# Patient Record
Sex: MALE | Race: WHITE | NOT HISPANIC OR LATINO | Employment: OTHER | RURAL
[De-identification: names, ages, dates, MRNs, and addresses within clinical notes are randomized per-mention and may not be internally consistent; named-entity substitution may affect disease eponyms.]

---

## 2017-01-10 ENCOUNTER — OFFICE VISIT (OUTPATIENT)
Dept: CARDIOLOGY | Facility: CLINIC | Age: 67
End: 2017-01-10

## 2017-01-10 VITALS
SYSTOLIC BLOOD PRESSURE: 137 MMHG | DIASTOLIC BLOOD PRESSURE: 69 MMHG | BODY MASS INDEX: 34.3 KG/M2 | WEIGHT: 239.6 LBS | HEIGHT: 70 IN | HEART RATE: 73 BPM

## 2017-01-10 DIAGNOSIS — I25.10 CORONARY ARTERY DISEASE INVOLVING NATIVE CORONARY ARTERY OF NATIVE HEART WITHOUT ANGINA PECTORIS: ICD-10-CM

## 2017-01-10 DIAGNOSIS — I34.0 MITRAL VALVE INSUFFICIENCY, UNSPECIFIED ETIOLOGY: Primary | ICD-10-CM

## 2017-01-10 DIAGNOSIS — I48.0 PAROXYSMAL ATRIAL FIBRILLATION (HCC): ICD-10-CM

## 2017-01-10 DIAGNOSIS — I87.2 PERIPHERAL VENOUS INSUFFICIENCY: ICD-10-CM

## 2017-01-10 PROCEDURE — 99212 OFFICE O/P EST SF 10 MIN: CPT | Performed by: INTERNAL MEDICINE

## 2017-01-10 RX ORDER — GUAIFENESIN 600 MG/1
1200 TABLET, EXTENDED RELEASE ORAL 2 TIMES DAILY
COMMUNITY
End: 2017-07-18

## 2017-01-10 NOTE — PROGRESS NOTES
"  OFFICE FOLLOW UP- [facility], [place]     Date of Encounter:01/10/2017     Name: Smith Craven  : 1950  Address: [address] [facility] [address]  Phone: [phone]    PCP: Jeane Baird MD  [address] [place] [id number]    Smith Craven is a 66 y.o. male.      Chief Complaint: Follow up for CAD     PROBLEM LIST:  1. Cardiac disease, multifactorial:   A.  Non-obstructive CAD by cath, 2008.   B.  Atrial fibrillation. Chads2-vasc = 0.   C.  Normal LV function.   D.  \"moderate to severe\" mitral regurgitation by transesophageal echocardiogram.  2.  Obesity.  3.  Neurocardiogenic syncope.  4.  Cholelithiasis status post cholecystectomy, summer 2016:   A. Procedure complicated by pneumonitis and need for blood transfusions.   5.  COPD and ANN MARIE, CPAP compliant:   A. Followed by Yossi Blake MD.  6.  Chronic lower extremity venous insufficiency.      No Known Allergies      Current Medications:   •  apixaban 5 mg po twice daily.  •  cholecalciferol (VITAMIN D3) 5,000 Units po daily.  •  Fluticasone Furoate-Vilanterol 200-25 MCG/INH aerosol powder , Inhale 1 puff Daily.  •  folic acid 1 MG po daily.   •  furosemide 40 MG po daily.    •  metoprolol tartrate 12.5 mg po twice daily.   •  potassium chloride 10 MEQ CR tablet po daily.       Subjective: When I last saw this patient in these offices last July I felt that his symptoms of shortness of air were likely pulmonary.  He was referred to Yossi Blake MD, for an evaluation and Dr. Blake basically concurs with this.  He has had no real change over the last several months except for a \"brief interruption\" in activities that was necessitated by a complicated cholecystectomy in the late summer of last year.  He has no angina and has stable exertional dyspnea that is reported to be \"a lot better\" after the initiation of a Breo inhaler.               The following portions of the patient's history were reviewed and updated as " "appropriate: allergies, current medications and problem list.    HPI: Pertinent positives as listed in the HPI.  All other systems reviewed and negative.      Objective:    Vitals:    01/10/17 1157 01/10/17 1158   BP: 132/78 137/69   BP Location: Left arm Left arm   Patient Position: Sitting Standing   Pulse: 73 73   Weight: 239 lb 9.6 oz (109 kg)    Height: 70\" (177.8 cm)        Physical Exam   Cardiovascular: Intact distal pulses.    Examination of the heart and lungs today reveals no audible murmur. I do not hear a gallop, rub, click or other abnormal sound.   Pulmonary/Chest:   The lung fields are distant. I do not hear crackles.    Musculoskeletal:   Extremities: Unremarkable.   Vitals reviewed.      Diagnostic Data:  N/A       Procedures N/a      Assessment:  The patient is overall doing well at the current time.     I do think we need to \"relook\" his mitral valve and we will do this with a transthoracic echo next summer.  Otherwise we will continue current medications and recommendations and have him follow up with Dr. Blake for the treatment of his pulmonary issues.       Scribed for Brant Napier MD by Lolita Willis. 1/10/2017  12:11 PM    I, Brant Napier MD, [facility], [facility], personally performed the services described in this documentation as scribed by the above named individual in my presence, and it is both accurate and complete. At 2:47 PM on 01/10/2017              "

## 2017-02-23 ENCOUNTER — OFFICE VISIT (OUTPATIENT)
Dept: PULMONOLOGY | Facility: CLINIC | Age: 67
End: 2017-02-23

## 2017-02-23 VITALS
HEART RATE: 61 BPM | BODY MASS INDEX: 34.36 KG/M2 | RESPIRATION RATE: 16 BRPM | HEIGHT: 70 IN | DIASTOLIC BLOOD PRESSURE: 78 MMHG | SYSTOLIC BLOOD PRESSURE: 122 MMHG | TEMPERATURE: 97.8 F | WEIGHT: 240 LBS | OXYGEN SATURATION: 92 %

## 2017-02-23 DIAGNOSIS — G47.33 OSA ON CPAP: ICD-10-CM

## 2017-02-23 DIAGNOSIS — R05.9 COUGH: ICD-10-CM

## 2017-02-23 DIAGNOSIS — Z99.89 OSA ON CPAP: ICD-10-CM

## 2017-02-23 DIAGNOSIS — J43.9 PULMONARY EMPHYSEMA, UNSPECIFIED EMPHYSEMA TYPE (HCC): Primary | ICD-10-CM

## 2017-02-23 DIAGNOSIS — E66.9 OBESITY (BMI 30-39.9): ICD-10-CM

## 2017-02-23 DIAGNOSIS — R06.02 SOB (SHORTNESS OF BREATH): ICD-10-CM

## 2017-02-23 PROCEDURE — 99214 OFFICE O/P EST MOD 30 MIN: CPT | Performed by: INTERNAL MEDICINE

## 2017-02-23 RX ORDER — DOXYCYCLINE HYCLATE 100 MG/1
100 CAPSULE ORAL 2 TIMES DAILY
COMMUNITY
End: 2017-07-18

## 2017-02-23 RX ORDER — ALBUTEROL SULFATE 90 UG/1
2 AEROSOL, METERED RESPIRATORY (INHALATION) EVERY 4 HOURS PRN
Status: DISCONTINUED | OUTPATIENT
Start: 2017-02-23 | End: 2021-05-27

## 2017-02-23 NOTE — PROGRESS NOTES
Subjective:     Chief Complaint:   Chief Complaint   Patient presents with   • Cough       HPI:    Smith Craven is a 66 y.o. male here for follow-up of his COPD and obstructive sleep apnea.  He also has a history of moderate to severe mitral regurgitation.  However, his dyspnea improved significantly on inhaled therapy so it has been felt that a significant portion of his dyspnea is pulmonary related as opposed to valvular related.    He continues on breo ellipta and as needed albuterol.  His level of dyspnea exertion is roughly the same.  It is overall improved compared to prior to initiation of inhaled therapy.  He notes no purulent sputum production.  He denies any hemoptysis.  He denies any chest pain.    He continues on CPAP therapy.  He notes no significant daytime somnolence.        Current medications are:   Current Outpatient Prescriptions:   •  apixaban (ELIQUIS) 5 MG tablet tablet, Take 5 mg by mouth 2 (two) times a day., Disp: , Rfl:   •  cholecalciferol (VITAMIN D3) 1000 UNITS tablet, Take 5,000 Units by mouth every morning., Disp: , Rfl:   •  doxycycline (VIBRAMYCIN) 100 MG capsule, Take 100 mg by mouth 2 (Two) Times a Day., Disp: , Rfl:   •  Fluticasone Furoate-Vilanterol 200-25 MCG/INH aerosol powder , Inhale 1 puff Daily., Disp: 1 each, Rfl: 11  •  folic acid (FOLVITE) 1 MG tablet, Take 1 mg by mouth every morning., Disp: , Rfl:   •  furosemide (LASIX) 40 MG tablet, Take 40 mg by mouth every morning., Disp: , Rfl:   •  guaiFENesin (MUCINEX) 600 MG 12 hr tablet, Take 1,200 mg by mouth 2 (Two) Times a Day., Disp: , Rfl:   •  metoprolol tartrate (LOPRESSOR) 25 MG tablet, Take 12.5 mg by mouth 2 (two) times a day., Disp: , Rfl:   •  polyethylene glycol (MIRALAX) powder, , Disp: , Rfl:   •  potassium chloride (K-DUR,KLOR-CON) 10 MEQ CR tablet, , Disp: , Rfl:   •  vitamin B-12 (CYANOCOBALAMIN) 1000 MCG tablet, Take 2,500 mcg by mouth daily., Disp: , Rfl:     Current Facility-Administered Medications:    •  albuterol (PROVENTIL HFA;VENTOLIN HFA) inhaler 2 puff, 2 puff, Inhalation, Q4H PRN, Yossi Blake MD.      The patient's relevant past medical, surgical, family and social history were reviewed and updated in Epic as appropriate.     ROS:    Review of Systems   HENT: Positive for congestion.    Respiratory: Positive for shortness of breath.    Psychiatric/Behavioral: Positive for sleep disturbance.         Objective:    Physical Exam   Constitutional: He is oriented to person, place, and time. He appears well-developed and well-nourished.   HENT:   Head: Normocephalic and atraumatic.   Nose: Nose normal.   Mouth/Throat: Oropharynx is clear and moist. No oropharyngeal exudate.   Class IV airway   Eyes: Conjunctivae are normal. Pupils are equal, round, and reactive to light. No scleral icterus.   Neck: Neck supple. No tracheal deviation present. No thyromegaly present.   Cardiovascular: Normal rate and regular rhythm.  Exam reveals no gallop and no friction rub.    No murmur heard.  Pulmonary/Chest: Effort normal. No respiratory distress. He has no wheezes. He has no rales. He exhibits no tenderness.   Few basilar crackles   Abdominal: Soft. Bowel sounds are normal. He exhibits no distension. There is no tenderness.   Musculoskeletal: He exhibits no edema or deformity.   Neurological: He is alert and oriented to person, place, and time.   Skin: Skin is warm and dry. No rash noted.   Psychiatric: He has a normal mood and affect. His behavior is normal.   Nursing note and vitals reviewed.      Diagnostics:     No additional    Assessment:    Problem List Items Addressed This Visit        Pulmonary Problems    COPD (chronic obstructive pulmonary disease) - Primary    Overview     Mild to moderate obstruction on Oct 2016 PFTs         Relevant Medications    Fluticasone Furoate-Vilanterol 200-25 MCG/INH aerosol powder     albuterol (PROVENTIL HFA;VENTOLIN HFA) inhaler 2 puff    SOB (shortness of breath)     Relevant Medications    albuterol (PROVENTIL HFA;VENTOLIN HFA) inhaler 2 puff    Cough    ANN MARIE on CPAP    Overview     CPAP compliant            Other    Obesity (BMI 30-39.9)    Overview     BMI 35.5               His obstructive airways disease as well as obstructive sleep apnea seem be well controlled on his current medication regimen and CPAP regimen.  He does have some residual shortness of breath.    Plan:     Continue breo ellipta  Continue as needed albuterol  Continue auto CPAP therapy  Long-term weight loss  He has follow-up with Dr. Napier who is monitoring his valvular disease via echocardiography      Signed by  Yossi Blake MD

## 2017-05-12 ENCOUNTER — HOSPITAL ENCOUNTER (OUTPATIENT)
Dept: HOSPITAL 79 - KOH-I | Age: 67
End: 2017-05-12
Attending: INTERNAL MEDICINE
Payer: COMMERCIAL

## 2017-05-12 DIAGNOSIS — K43.9: ICD-10-CM

## 2017-05-12 DIAGNOSIS — R93.3: ICD-10-CM

## 2017-05-12 DIAGNOSIS — L02.211: ICD-10-CM

## 2017-05-12 DIAGNOSIS — K42.9: Primary | ICD-10-CM

## 2017-07-18 ENCOUNTER — OFFICE VISIT (OUTPATIENT)
Dept: CARDIOLOGY | Facility: CLINIC | Age: 67
End: 2017-07-18

## 2017-07-18 ENCOUNTER — HOSPITAL ENCOUNTER (OUTPATIENT)
Dept: CARDIOLOGY | Facility: HOSPITAL | Age: 67
Discharge: HOME | End: 2017-07-18
Attending: INTERNAL MEDICINE | Admitting: INTERNAL MEDICINE

## 2017-07-18 VITALS
WEIGHT: 246.2 LBS | BODY MASS INDEX: 35.24 KG/M2 | HEART RATE: 56 BPM | HEIGHT: 70 IN | DIASTOLIC BLOOD PRESSURE: 74 MMHG | SYSTOLIC BLOOD PRESSURE: 128 MMHG

## 2017-07-18 VITALS — HEIGHT: 70 IN | WEIGHT: 240 LBS | BODY MASS INDEX: 34.36 KG/M2

## 2017-07-18 DIAGNOSIS — I34.0 MITRAL VALVE INSUFFICIENCY, UNSPECIFIED ETIOLOGY: ICD-10-CM

## 2017-07-18 DIAGNOSIS — I34.0 MITRAL VALVE INSUFFICIENCY, UNSPECIFIED ETIOLOGY: Primary | ICD-10-CM

## 2017-07-18 DIAGNOSIS — I48.0 PAROXYSMAL ATRIAL FIBRILLATION (HCC): ICD-10-CM

## 2017-07-18 LAB
BH CV ECHO MEAS - AO ROOT AREA (BSA CORRECTED): 1.6
BH CV ECHO MEAS - AO ROOT AREA: 10.2 CM^2
BH CV ECHO MEAS - AO ROOT DIAM: 3.6 CM
BH CV ECHO MEAS - BSA(HAYCOCK): 2.4 M^2
BH CV ECHO MEAS - BSA: 2.3 M^2
BH CV ECHO MEAS - BZI_BMI: 34.4 KILOGRAMS/M^2
BH CV ECHO MEAS - BZI_METRIC_HEIGHT: 177.8 CM
BH CV ECHO MEAS - BZI_METRIC_WEIGHT: 108.9 KG
BH CV ECHO MEAS - CONTRAST EF (2CH): 50.3 ML/M^2
BH CV ECHO MEAS - CONTRAST EF 4CH: 67.9 ML/M^2
BH CV ECHO MEAS - EDV(CUBED): 175.6 ML
BH CV ECHO MEAS - EDV(MOD-SP2): 143 ML
BH CV ECHO MEAS - EDV(MOD-SP4): 196 ML
BH CV ECHO MEAS - EDV(TEICH): 153.7 ML
BH CV ECHO MEAS - EF(CUBED): 69.7 %
BH CV ECHO MEAS - EF(MOD-SP2): 50.3 %
BH CV ECHO MEAS - EF(MOD-SP4): 67.9 %
BH CV ECHO MEAS - EF(TEICH): 60.7 %
BH CV ECHO MEAS - ESV(CUBED): 53.2 ML
BH CV ECHO MEAS - ESV(MOD-SP2): 71 ML
BH CV ECHO MEAS - ESV(MOD-SP4): 63 ML
BH CV ECHO MEAS - ESV(TEICH): 60.4 ML
BH CV ECHO MEAS - FS: 32.9 %
BH CV ECHO MEAS - IVS/LVPW: 0.98
BH CV ECHO MEAS - IVSD: 1.2 CM
BH CV ECHO MEAS - LA DIMENSION: 5.1 CM
BH CV ECHO MEAS - LA/AO: 1.4
BH CV ECHO MEAS - LAT PEAK E' VEL: 8.5 CM/SEC
BH CV ECHO MEAS - LV DIASTOLIC VOL/BSA (35-75): 86.9 ML/M^2
BH CV ECHO MEAS - LV MASS(C)D: 288.5 GRAMS
BH CV ECHO MEAS - LV MASS(C)DI: 127.9 GRAMS/M^2
BH CV ECHO MEAS - LV MAX PG: 5.8 MMHG
BH CV ECHO MEAS - LV MEAN PG: 2 MMHG
BH CV ECHO MEAS - LV SYSTOLIC VOL/BSA (12-30): 27.9 ML/M^2
BH CV ECHO MEAS - LV V1 MAX: 120 CM/SEC
BH CV ECHO MEAS - LV V1 MEAN: 65.1 CM/SEC
BH CV ECHO MEAS - LV V1 VTI: 25.7 CM
BH CV ECHO MEAS - LVIDD: 5.6 CM
BH CV ECHO MEAS - LVIDS: 3.8 CM
BH CV ECHO MEAS - LVLD AP2: 8.9 CM
BH CV ECHO MEAS - LVLD AP4: 9.2 CM
BH CV ECHO MEAS - LVLS AP2: 7.8 CM
BH CV ECHO MEAS - LVLS AP4: 7.9 CM
BH CV ECHO MEAS - LVOT AREA (M): 3.5 CM^2
BH CV ECHO MEAS - LVOT AREA: 3.5 CM^2
BH CV ECHO MEAS - LVOT DIAM: 2.1 CM
BH CV ECHO MEAS - LVPWD: 1.2 CM
BH CV ECHO MEAS - MED PEAK E' VEL: 10.3 CM/SEC
BH CV ECHO MEAS - MV A MAX VEL: 49.4 CM/SEC
BH CV ECHO MEAS - MV E MAX VEL: 128 CM/SEC
BH CV ECHO MEAS - MV E/A: 2.6
BH CV ECHO MEAS - RAP SYSTOLE: 8 MMHG
BH CV ECHO MEAS - RVDD: 3.5 CM
BH CV ECHO MEAS - RVSP: 41.4 MMHG
BH CV ECHO MEAS - SI(CUBED): 54.3 ML/M^2
BH CV ECHO MEAS - SI(LVOT): 39.5 ML/M^2
BH CV ECHO MEAS - SI(MOD-SP2): 31.9 ML/M^2
BH CV ECHO MEAS - SI(MOD-SP4): 59 ML/M^2
BH CV ECHO MEAS - SI(TEICH): 41.3 ML/M^2
BH CV ECHO MEAS - SV(CUBED): 122.5 ML
BH CV ECHO MEAS - SV(LVOT): 89 ML
BH CV ECHO MEAS - SV(MOD-SP2): 72 ML
BH CV ECHO MEAS - SV(MOD-SP4): 133 ML
BH CV ECHO MEAS - SV(TEICH): 93.3 ML
BH CV ECHO MEAS - TAPSE (>1.6): 1.9 CM2
BH CV ECHO MEAS - TR MAX VEL: 289 CM/SEC
BH CV VAS BP RIGHT ARM: NORMAL MMHG
BH CV XLRA - RV BASE: 4.9 CM
BH CV XLRA - RV LENGTH: 8.4 CM
BH CV XLRA - RV MID: 3.4 CM
BH CV XLRA - TDI S': 15.5 CM/SEC
E/E' RATIO: 14
LEFT ATRIUM VOLUME INDEX: 44.2 ML/M2

## 2017-07-18 PROCEDURE — 93306 TTE W/DOPPLER COMPLETE: CPT | Performed by: INTERNAL MEDICINE

## 2017-07-18 PROCEDURE — 93306 TTE W/DOPPLER COMPLETE: CPT

## 2017-07-18 PROCEDURE — 99214 OFFICE O/P EST MOD 30 MIN: CPT | Performed by: INTERNAL MEDICINE

## 2017-07-18 NOTE — PROGRESS NOTES
"  OFFICE FOLLOW UP     Date of Encounter:2017     Name: Smith Craven  : 1950  Address: [address] [id number]  Phone: [phone]    PCP: Jeane Baird MD  [address] [place] [address]   [facility] [address]    Smith Craven is a 66 y.o.  white male, resident of [facility] .    Chief Complaint:  Follow up of CAD, afib    Problem List:   1. Cardiac disease, multifactorial:    A. Non-obstructive CAD by cath, 2008.    B. Atrial fibrillation. Chads2-vasc = 0.    C. Normal LV function.    D. \"moderate to severe\" mitral regurgitation by transesophageal echocardiogram 2017  E. \"moderate to severe\" mitral regurgitation by TTE 2017, unchanged from previous study    2. Obesity.   3. Neurocardiogenic syncope.   4. Cholelithiasis status post cholecystectomy, summer 2016:    A. Procedure complicated by pneumonitis and need for blood transfusions.    5. COPD and ANN MARIE, CPAP compliant:    A. Followed by Yossi Blake MD.   6. Chronic lower extremity venous insufficiency.    Allergies:  No Known Allergies    Current Medications:  •  apixaban 5 MG by mouth twice daily.   •  cholecalciferol 1000 UNITS 5,000 Units by mouth every morning.  •  folic acid 1 MG by mouth every morning.  •  Furosemide 40 MG by mouth every morning.   •  metoprolol tartrate 25 MG - 12.5 mg by mouth twice daily    •  potassium chloride 10 MEQ CR by mouth Daily.   •  vitamin B-12 1000 MCG - 2,500 mcg by mouth daily.    History of Present Illness:           Mr. Craven presents today for 6 month follow-up. Since his last visit he has remained stable and asymptomatic from cardiac standpoint. He denies any chest pain, palpitations, shortness of breath at rest or CHF symptoms. He had a LAUREN in January that was significant for severe MR. He returns today for 6 month follow up with an echocardiogram to \"relook\" his MV. He does have some exertional dyspnea, however he states this is much improved. He continues to follow up with Dr." "Hayden.     The following portions of the patient's history were reviewed and updated as appropriate: allergies, current medications and problem list.    HPI: Pertinent positives as listed in the HPI.  All other systems reviewed and negative.    Objective:    Vitals:    07/18/17 1528 07/18/17 1530   BP: 133/68 128/74   BP Location: Left arm Left arm   Patient Position: Sitting Standing   Pulse: (!) 49 56   Weight: 246 lb 3.2 oz (112 kg)    Height: 70\" (177.8 cm)        Physical Exam:  GENERAL: Alert, cooperative, in no acute distress.   HEENT: Fundoscopic deferred, otherwise unremarkable.  NECK: No Jugular venous distention, adenopathy, or thyromegaly noted.   HEART: Irregular rhythm, slow rate, and no murmurs, gallops, or rubs.   LUNGS: Diminished breath sounds. No wheezing, rales or ronchi.  NEUROLOGIC: No focal abnormalities involving strength or sensation are noted.   EXTREMITIES: No clubbing, cyanosis, or edema noted.     Diagnostic Data:    No recent labs available    Procedures: N/A      Assessment and Plan:         1. Moderate-severe MR: stable and asymptomatic. He has not had any CHF symptoms, and his dyspnea is improved.   2. Atrial fibrillation: on Eliquis for anticoagulation.  3. HTN: well controlled.   4. Continue current medical regimen and follow up in 6 months or sooner as needed.       Scribed for Brant Napier MD by Hattie Eduardo PA-C. 7/18/2017  4:00 PM    I, Brant Napier MD, [facility], [facility], personally performed the services described in this documentation as scribed by the above named individual in my presence, and it is both accurate and complete. At 6:55 PM on 07/18/2017           "

## 2017-08-17 ENCOUNTER — TELEPHONE (OUTPATIENT)
Dept: CARDIOLOGY | Facility: CLINIC | Age: 67
End: 2017-08-17

## 2017-08-17 NOTE — TELEPHONE ENCOUNTER
[facility] called for cardiac clearance for scheduled elective hernia repair with Dr. Michael Irvin on 8/28/17. Reviewed with [facility] and order noted to clear however he needs to take an 81 mg ASA daily and hold Eliquis 3 days prior to surgery. Pt aware and clearance faxed. [facility]

## 2017-09-22 ENCOUNTER — OFFICE VISIT (OUTPATIENT)
Dept: PULMONOLOGY | Facility: CLINIC | Age: 67
End: 2017-09-22

## 2017-09-22 VITALS
DIASTOLIC BLOOD PRESSURE: 72 MMHG | HEIGHT: 68 IN | SYSTOLIC BLOOD PRESSURE: 114 MMHG | HEART RATE: 68 BPM | TEMPERATURE: 98.2 F | OXYGEN SATURATION: 96 % | RESPIRATION RATE: 16 BRPM | BODY MASS INDEX: 37.13 KG/M2 | WEIGHT: 245 LBS

## 2017-09-22 DIAGNOSIS — G47.33 OSA ON CPAP: ICD-10-CM

## 2017-09-22 DIAGNOSIS — Z99.89 OSA ON CPAP: ICD-10-CM

## 2017-09-22 DIAGNOSIS — E66.9 OBESITY (BMI 30-39.9): ICD-10-CM

## 2017-09-22 DIAGNOSIS — R05.9 COUGH: ICD-10-CM

## 2017-09-22 DIAGNOSIS — J44.9 CHRONIC OBSTRUCTIVE PULMONARY DISEASE, UNSPECIFIED COPD TYPE (HCC): Primary | ICD-10-CM

## 2017-09-22 PROCEDURE — 94010 BREATHING CAPACITY TEST: CPT | Performed by: INTERNAL MEDICINE

## 2017-09-22 PROCEDURE — 99214 OFFICE O/P EST MOD 30 MIN: CPT | Performed by: INTERNAL MEDICINE

## 2017-09-22 PROCEDURE — 71020 CHG CHEST X-RAY 2 VW: CPT | Performed by: INTERNAL MEDICINE

## 2017-09-22 NOTE — PROGRESS NOTES
Subjective:     Chief Complaint:   Chief Complaint   Patient presents with   • COPD       HPI:    Smith Craven is a 66 y.o. male here for follow-up of COPD and obstructive sleep apnea.  He has a history of moderate to severe mitral regurgitation and a recent echocardiogram was stable.  He has seen Dr. Napier.    His dyspnea is stable.  However he is complaining of significant cough.  He will occasionally produces green sputum but often it is clear.  He denies any hemoptysis.    He remains on breo ellipta and as needed albuterol.  He thinks his cough is worse over the last month.    He does admit to some sinus drainage that sounds like is worse.  He has remote history of reflux problems but has not had symptoms recently.  He is on no acid blocking medication.    Current medications are:   Current Outpatient Prescriptions:   •  apixaban (ELIQUIS) 5 MG tablet tablet, Take 5 mg by mouth 2 (two) times a day., Disp: , Rfl:   •  cholecalciferol (VITAMIN D3) 1000 UNITS tablet, Take 5,000 Units by mouth every morning., Disp: , Rfl:   •  Fluticasone Furoate-Vilanterol (BREO ELLIPTA) 200-25 MCG/INH aerosol powder , Inhale 1 puff Daily., Disp: , Rfl:   •  folic acid (FOLVITE) 1 MG tablet, Take 1 mg by mouth every morning., Disp: , Rfl:   •  furosemide (LASIX) 40 MG tablet, Take 40 mg by mouth every morning., Disp: , Rfl:   •  metoprolol tartrate (LOPRESSOR) 25 MG tablet, Take 12.5 mg by mouth 2 (two) times a day., Disp: , Rfl:   •  polyethylene glycol (MIRALAX) powder, Take 17 g by mouth As Needed., Disp: , Rfl:   •  potassium chloride (K-DUR,KLOR-CON) 10 MEQ CR tablet, Take 10 mEq by mouth Daily., Disp: , Rfl:   •  vitamin B-12 (CYANOCOBALAMIN) 1000 MCG tablet, Take 2,500 mcg by mouth daily., Disp: , Rfl:     Current Facility-Administered Medications:   •  albuterol (PROVENTIL HFA;VENTOLIN HFA) inhaler 2 puff, 2 puff, Inhalation, Q4H PRN, Yossi Blake MD.      The patient's relevant past medical, surgical, family  and social history were reviewed and updated in Epic as appropriate.     ROS:    Review of Systems   HENT: Positive for congestion.    Respiratory: Positive for cough.    Gastrointestinal: Negative for nausea.         Objective:    Physical Exam   Constitutional: He is oriented to person, place, and time. He appears well-developed and well-nourished.   HENT:   Head: Normocephalic and atraumatic.   Nose: Nose normal.   Mouth/Throat: Oropharynx is clear and moist. No oropharyngeal exudate.   Class IV airway   Eyes: Conjunctivae are normal. Pupils are equal, round, and reactive to light. No scleral icterus.   Neck: Neck supple. No tracheal deviation present. No thyromegaly present.   Cardiovascular: Normal rate and regular rhythm.  Exam reveals no gallop and no friction rub.    No murmur heard.  Pulmonary/Chest: Effort normal. No respiratory distress. He has no wheezes. He has no rales. He exhibits no tenderness.   Few basilar crackles   Musculoskeletal: He exhibits no edema or deformity.   Neurological: He is alert and oriented to person, place, and time.   Skin: Skin is warm and dry. No rash noted.   Psychiatric: He has a normal mood and affect. His behavior is normal.   Nursing note and vitals reviewed.      Diagnostics:     PFTs: Moderate restriction.  I don't see much obstruction but his performance on the loop was not optimal.  He has been obstructed in the past.    Assessment:    Problem List Items Addressed This Visit        Pulmonary Problems    COPD (chronic obstructive pulmonary disease) - Primary    Overview     Mild to moderate obstruction on Oct 2016 PFTs         Relevant Medications    Fluticasone Furoate-Vilanterol (BREO ELLIPTA) 200-25 MCG/INH aerosol powder     Other Relevant Orders    XR Chest PA & Lateral    Spirometry Without Bronchodilator (Completed)    Cough    ANN MARIE on CPAP    Overview     CPAP compliant            Other    Obesity (BMI 30-39.9)    Overview     BMI 35.5               His cough  may be due to upper airway drainage.  I see no other clear cause other than possibly silent reflux.  He is on good inhaled therapy from a pulmonary standpoint.  I recommended he use an antihistamine and an inhaled nasal steroid religiously for the next month to see if this helps his cough.  If that does not he could try a proton pump inhibitor at twice the OTC dose.  If he still has problems with persistent cough we can see him back here and consider a further workup but I want him to start with a diagnostic trial of medications.    Plan:     1. Inhaled steroids  2. Antihistamine  3. PPI necessary  4. As above  5. Close follow-up  6. Discussed with patient and wife in detail.      Signed by  Yossi Blake MD

## 2018-03-21 ENCOUNTER — OFFICE VISIT (OUTPATIENT)
Dept: PULMONOLOGY | Facility: CLINIC | Age: 68
End: 2018-03-21

## 2018-03-21 VITALS
TEMPERATURE: 97.7 F | OXYGEN SATURATION: 95 % | SYSTOLIC BLOOD PRESSURE: 116 MMHG | BODY MASS INDEX: 36.7 KG/M2 | RESPIRATION RATE: 16 BRPM | HEART RATE: 62 BPM | HEIGHT: 68 IN | DIASTOLIC BLOOD PRESSURE: 84 MMHG | WEIGHT: 242.13 LBS

## 2018-03-21 DIAGNOSIS — Z99.89 OSA ON CPAP: ICD-10-CM

## 2018-03-21 DIAGNOSIS — G47.33 OSA ON CPAP: ICD-10-CM

## 2018-03-21 DIAGNOSIS — R06.02 SOB (SHORTNESS OF BREATH): ICD-10-CM

## 2018-03-21 DIAGNOSIS — E66.9 OBESITY (BMI 30-39.9): ICD-10-CM

## 2018-03-21 DIAGNOSIS — J44.9 CHRONIC OBSTRUCTIVE PULMONARY DISEASE, UNSPECIFIED COPD TYPE (HCC): Primary | ICD-10-CM

## 2018-03-21 DIAGNOSIS — R05.9 COUGH: ICD-10-CM

## 2018-03-21 PROBLEM — E80.4 GILBERT'S SYNDROME: Status: ACTIVE | Noted: 2018-03-21

## 2018-03-21 PROCEDURE — 99214 OFFICE O/P EST MOD 30 MIN: CPT | Performed by: INTERNAL MEDICINE

## 2018-03-21 RX ORDER — PREDNISONE 10 MG/1
TABLET ORAL
Qty: 31 TABLET | Refills: 0 | Status: SHIPPED | OUTPATIENT
Start: 2018-03-21 | End: 2018-07-24 | Stop reason: ALTCHOICE

## 2018-03-21 RX ORDER — DOXYCYCLINE HYCLATE 100 MG/1
100 CAPSULE ORAL 2 TIMES DAILY
Qty: 20 CAPSULE | Refills: 0 | Status: SHIPPED | OUTPATIENT
Start: 2018-03-21 | End: 2018-07-24

## 2018-03-21 NOTE — PROGRESS NOTES
Subjective:     Chief Complaint:   Chief Complaint   Patient presents with   • COPD     f/u   • Shortness of Breath       HPI:    Smith Craven is a 67 y.o. male here for follow-up of COPD and obstructive sleep apnea.  He has a history of moderate to severe mitral regurgitation.  He has seen Dr. Napier for this.    When I last saw him in September he was complaining of a significant cough.  I recommended an antihistamine for sinus drainage and a PPI if necessary.  His cough completely resolved.  His wife notes that Mucinex seemed to help him.    He remains on breo ellipta and as needed albuterol.    In the last 2 weeks he has become increasingly short of breath and he has had increased cough.  He is producing green mucus.  He notes no sinus drainage.  He has no fevers or chills.  He notes no increased lower extremity edema.    He uses CPAP at night but only uses this intermittently when he is ill.    Current medications are:   Current Outpatient Prescriptions:   •  apixaban (ELIQUIS) 5 MG tablet tablet, Take 5 mg by mouth 2 (two) times a day., Disp: , Rfl:   •  cholecalciferol (VITAMIN D3) 1000 UNITS tablet, Take 5,000 Units by mouth every morning., Disp: , Rfl:   •  Fluticasone Furoate-Vilanterol (BREO ELLIPTA) 200-25 MCG/INH aerosol powder , Inhale 1 puff Daily., Disp: 2 each, Rfl: 0  •  folic acid (FOLVITE) 1 MG tablet, Take 1 mg by mouth every morning., Disp: , Rfl:   •  furosemide (LASIX) 40 MG tablet, Take 40 mg by mouth every morning., Disp: , Rfl:   •  metoprolol tartrate (LOPRESSOR) 25 MG tablet, Take 12.5 mg by mouth 2 (two) times a day., Disp: , Rfl:   •  polyethylene glycol (MIRALAX) powder, Take 17 g by mouth As Needed., Disp: , Rfl:   •  potassium chloride (K-DUR,KLOR-CON) 10 MEQ CR tablet, Take 10 mEq by mouth Daily., Disp: , Rfl:   •  vitamin B-12 (CYANOCOBALAMIN) 1000 MCG tablet, Take 2,500 mcg by mouth daily., Disp: , Rfl:   •  doxycycline (VIBRAMYCIN) 100 MG capsule, Take 1 capsule by mouth 2  (Two) Times a Day., Disp: 20 capsule, Rfl: 0  •  predniSONE (DELTASONE) 10 MG tablet, Take 4 tabs daily x 3 days, then take 3 tabs daily x 3 days, then take 2 tabs daily x 3 days, then take 1 tab daily x 3 days, Disp: 31 tablet, Rfl: 0    Current Facility-Administered Medications:   •  albuterol (PROVENTIL HFA;VENTOLIN HFA) inhaler 2 puff, 2 puff, Inhalation, Q4H PRN, Yossi Blake MD.      The patient's relevant past medical, surgical, family and social history were reviewed and updated in Epic as appropriate.     ROS:    Review of Systems   Constitutional: Negative for fever.   HENT: Negative for congestion.    Respiratory: Positive for cough and shortness of breath.          Objective:    Physical Exam   Constitutional: He is oriented to person, place, and time. He appears well-developed and well-nourished.   HENT:   Head: Normocephalic and atraumatic.   Nose: Nose normal.   Mouth/Throat: Oropharynx is clear and moist. No oropharyngeal exudate.   Class IV airway   Eyes: Conjunctivae are normal. Pupils are equal, round, and reactive to light. No scleral icterus.   Neck: Neck supple. No tracheal deviation present. No thyromegaly present.   Cardiovascular: Normal rate.  Exam reveals no gallop and no friction rub.    No murmur heard.  Irregular   Pulmonary/Chest: Effort normal. No respiratory distress. He has no wheezes. He has no rales. He exhibits no tenderness.   Few basilar crackles   Musculoskeletal: He exhibits no edema or deformity.   Neurological: He is alert and oriented to person, place, and time.   Skin: Skin is warm and dry. No rash noted.   Psychiatric: He has a normal mood and affect. His behavior is normal.   Nursing note and vitals reviewed.      Diagnostics:      no additional.  Previous studies reviewed.    Assessment:    Problem List Items Addressed This Visit        Pulmonary Problems    SOB (shortness of breath)    Cough    ANN MARIE on CPAP    Overview     CPAP compliant         COPD  (chronic obstructive pulmonary disease) - Primary    Overview     Mild to moderate obstruction on Oct 2016 PFTs         Relevant Medications    predniSONE (DELTASONE) 10 MG tablet       Other    Obesity (BMI 30-39.9)    Overview     BMI 35.5           Other Visit Diagnoses    None.         Acute bronchitis with exacerbation of his COPD with increasing shortness of breath and cough over several weeks    Plan:     1. Prednisone pulse and taper  2. Doxycycline 100 mg twice a day for 10 days  3. Continue current inhaled therapy  4. Mucinex until back to baseline  5. Routine follow-up or he will come earlier if he has continued problems    Discussed in detail with the patient.  He will call prior to his follow up visit for any new problems.    Signed by  Yossi Blake MD

## 2018-07-24 ENCOUNTER — HOSPITAL ENCOUNTER (OUTPATIENT)
Dept: CARDIOLOGY | Facility: HOSPITAL | Age: 68
Discharge: HOME | End: 2018-07-24
Attending: INTERNAL MEDICINE | Admitting: INTERNAL MEDICINE

## 2018-07-24 ENCOUNTER — OFFICE VISIT (OUTPATIENT)
Dept: CARDIOLOGY | Facility: CLINIC | Age: 68
End: 2018-07-24

## 2018-07-24 VITALS
HEART RATE: 59 BPM | DIASTOLIC BLOOD PRESSURE: 78 MMHG | BODY MASS INDEX: 34.62 KG/M2 | HEIGHT: 70 IN | WEIGHT: 241.8 LBS | SYSTOLIC BLOOD PRESSURE: 155 MMHG

## 2018-07-24 DIAGNOSIS — R60.0 BILATERAL LEG EDEMA: ICD-10-CM

## 2018-07-24 DIAGNOSIS — R55 NEUROCARDIOGENIC SYNCOPE: ICD-10-CM

## 2018-07-24 DIAGNOSIS — I48.0 PAROXYSMAL ATRIAL FIBRILLATION (HCC): Primary | ICD-10-CM

## 2018-07-24 DIAGNOSIS — I34.0 MITRAL VALVE INSUFFICIENCY, UNSPECIFIED ETIOLOGY: ICD-10-CM

## 2018-07-24 DIAGNOSIS — R09.89 JVD (JUGULAR VENOUS DISTENSION): ICD-10-CM

## 2018-07-24 LAB
BH CV ECHO MEAS - AO ROOT AREA (BSA CORRECTED): 1.5
BH CV ECHO MEAS - AO ROOT AREA: 8.8 CM^2
BH CV ECHO MEAS - AO ROOT DIAM: 3.3 CM
BH CV ECHO MEAS - BSA(HAYCOCK): 2.4 M^2
BH CV ECHO MEAS - BSA: 2.3 M^2
BH CV ECHO MEAS - BZI_BMI: 34.6 KILOGRAMS/M^2
BH CV ECHO MEAS - BZI_METRIC_HEIGHT: 177.8 CM
BH CV ECHO MEAS - BZI_METRIC_WEIGHT: 109.3 KG
BH CV ECHO MEAS - CONTRAST EF (2CH): 64.2 ML/M^2
BH CV ECHO MEAS - CONTRAST EF 4CH: 66.5 ML/M^2
BH CV ECHO MEAS - EDV(CUBED): 200.7 ML
BH CV ECHO MEAS - EDV(MOD-SP2): 134 ML
BH CV ECHO MEAS - EDV(MOD-SP4): 179 ML
BH CV ECHO MEAS - EDV(TEICH): 170.2 ML
BH CV ECHO MEAS - EF(CUBED): 66.6 %
BH CV ECHO MEAS - EF(MOD-BP): 64 %
BH CV ECHO MEAS - EF(MOD-SP2): 64.2 %
BH CV ECHO MEAS - EF(MOD-SP4): 66.5 %
BH CV ECHO MEAS - EF(TEICH): 57.3 %
BH CV ECHO MEAS - ESV(CUBED): 67 ML
BH CV ECHO MEAS - ESV(MOD-SP2): 48 ML
BH CV ECHO MEAS - ESV(MOD-SP4): 60 ML
BH CV ECHO MEAS - ESV(TEICH): 72.6 ML
BH CV ECHO MEAS - FS: 30.6 %
BH CV ECHO MEAS - IVS/LVPW: 1.1
BH CV ECHO MEAS - IVSD: 1.1 CM
BH CV ECHO MEAS - LA DIMENSION: 6.3 CM
BH CV ECHO MEAS - LA/AO: 1.9
BH CV ECHO MEAS - LAT PEAK E' VEL: 10.6 CM/SEC
BH CV ECHO MEAS - LV DIASTOLIC VOL/BSA (35-75): 79.2 ML/M^2
BH CV ECHO MEAS - LV MASS(C)D: 263.7 GRAMS
BH CV ECHO MEAS - LV MASS(C)DI: 116.7 GRAMS/M^2
BH CV ECHO MEAS - LV SYSTOLIC VOL/BSA (12-30): 26.6 ML/M^2
BH CV ECHO MEAS - LVIDD: 5.9 CM
BH CV ECHO MEAS - LVIDS: 4.1 CM
BH CV ECHO MEAS - LVLD AP2: 8.7 CM
BH CV ECHO MEAS - LVLD AP4: 8.3 CM
BH CV ECHO MEAS - LVLS AP2: 6.4 CM
BH CV ECHO MEAS - LVLS AP4: 6.4 CM
BH CV ECHO MEAS - LVOT AREA (M): 3.1 CM^2
BH CV ECHO MEAS - LVOT AREA: 3 CM^2
BH CV ECHO MEAS - LVOT DIAM: 2 CM
BH CV ECHO MEAS - LVPWD: 1 CM
BH CV ECHO MEAS - MED PEAK E' VEL: 6.4 CM/SEC
BH CV ECHO MEAS - MR ALIAS VEL: 38.2 CM/SEC
BH CV ECHO MEAS - MR ERO: 0.15 CM^2
BH CV ECHO MEAS - MR FLOW RATE: 83 CM^3/SEC
BH CV ECHO MEAS - MR MAX PG: 119 MMHG
BH CV ECHO MEAS - MR MAX VEL: 545.4 CM/SEC
BH CV ECHO MEAS - MR MEAN PG: 77.6 MMHG
BH CV ECHO MEAS - MR MEAN VEL: 421.2 CM/SEC
BH CV ECHO MEAS - MR PISA RADIUS: 0.59 CM
BH CV ECHO MEAS - MR PISA: 2.2 CM^2
BH CV ECHO MEAS - MR VOLUME: 30.3 ML
BH CV ECHO MEAS - MR VTI: 198.7 CM
BH CV ECHO MEAS - MV A MAX VEL: 49.4 CM/SEC
BH CV ECHO MEAS - MV AREA (1 DIAM): 17.1 CM^2
BH CV ECHO MEAS - MV DIAM: 4.7 CM
BH CV ECHO MEAS - MV E MAX VEL: 124.9 CM/SEC
BH CV ECHO MEAS - MV E/A: 2.5
BH CV ECHO MEAS - MV FLOW AREA(1DIAM): 17.1 CM^2
BH CV ECHO MEAS - MV MAX PG: 11.4 MMHG
BH CV ECHO MEAS - MV MEAN PG: 3.2 MMHG
BH CV ECHO MEAS - MV V2 MAX: 168.9 CM/SEC
BH CV ECHO MEAS - MV V2 MEAN: 77.1 CM/SEC
BH CV ECHO MEAS - MV V2 VTI: 48.5 CM
BH CV ECHO MEAS - PA ACC SLOPE: 601.8 CM/SEC^2
BH CV ECHO MEAS - PA ACC TIME: 0.14 SEC
BH CV ECHO MEAS - PA PR(ACCEL): 14 MMHG
BH CV ECHO MEAS - PI END-D VEL: 155.3 CM/SEC
BH CV ECHO MEAS - SI(CUBED): 59.1 ML/M^2
BH CV ECHO MEAS - SI(MOD-SP2): 38.1 ML/M^2
BH CV ECHO MEAS - SI(MOD-SP4): 52.7 ML/M^2
BH CV ECHO MEAS - SI(MV 1 DIAM): 367.3 ML/M^2
BH CV ECHO MEAS - SI(TEICH): 43.2 ML/M^2
BH CV ECHO MEAS - SV(CUBED): 133.6 ML
BH CV ECHO MEAS - SV(MOD-SP2): 86 ML
BH CV ECHO MEAS - SV(MOD-SP4): 119 ML
BH CV ECHO MEAS - SV(MV 1 DIAM): 829.8 ML
BH CV ECHO MEAS - SV(TEICH): 97.6 ML
BH CV ECHO MEAS - TAPSE (>1.6): 2.2 CM2
BH CV ECHO MEAS - TR MAX VEL: 324.3 CM/SEC
BH CV ECHO MEASUREMENTS AVERAGE E/E' RATIO: 14.69
BH CV VAS BP RIGHT ARM: NORMAL MMHG
BH CV XLRA - RV BASE: 5.5 CM
BH CV XLRA - RV LENGTH: 8.2 CM
BH CV XLRA - RV MID: 3.4 CM
BH CV XLRA - TDI S': 9.4 CM/SEC
LEFT ATRIUM VOLUME INDEX: 61.1 ML/M2
MAXIMAL PREDICTED HEART RATE: 151 BPM
MR PISA EROA: 0.16 CM2
PISA ALIASING VEL: 38.2 M/S
PISA RADIUS: 0.6 CM
STRESS TARGET HR: 128 BPM

## 2018-07-24 PROCEDURE — 25010000002 SULFUR HEXAFLUORIDE MICROSPH 60.7-25 MG RECONSTITUTED SUSPENSION: Performed by: INTERNAL MEDICINE

## 2018-07-24 PROCEDURE — 93306 TTE W/DOPPLER COMPLETE: CPT

## 2018-07-24 PROCEDURE — 93306 TTE W/DOPPLER COMPLETE: CPT | Performed by: INTERNAL MEDICINE

## 2018-07-24 PROCEDURE — 99214 OFFICE O/P EST MOD 30 MIN: CPT | Performed by: INTERNAL MEDICINE

## 2018-07-24 RX ADMIN — SULFUR HEXAFLUORIDE 2 ML: KIT at 16:00

## 2018-07-24 NOTE — PROGRESS NOTES
"  OFFICE FOLLOW UP     Date of Encounter:2018     Name: Smith Craven  : 1950  Address: [address] [facility] [place] [address]    PCP: Jeane Baird MD  [address] [facility] [place] [address]    Smith Craven is a 67 y.o. male.    Chief Complaint: Follow up of VHD, PAF - under evaluation for cirrhosis, needs clearance to hold Eliquis.    Problem List:   1. Cardiac disease, multifactorial:                          A. Non-obstructive CAD by cath, 2008.                          B. Atrial fibrillation. Chads2-vasc = 0.                          C. Normal LV function.                          D. \"moderate to severe\" mitral regurgitation by transesophageal echocardiogram 2017  E. \"moderate to severe\" mitral regurgitation by TTE 2017, unchanged from previous study               2.  Obesity.              3.  Neurocardiogenic syncope.              4.  Cholelithiasis status post cholecystectomy, summer 2016:                          A. Procedure complicated by pneumonitis and need for blood transfusions.               5.  COPD and ANN MARIE, CPAP compliant:                          A. Followed by Yossi Blake MD.              6.  Chronic lower extremity venous insufficiency.   7.  Gilbert's syndrome    8.  Under evaluation for cirrhosis, needs liver biopsy, Summer 2018     Allergies:  No Known Allergies    Current Medications:  •  apixaban 5 mg by mouth 2 (two) times a day.  •  cholecalciferol 5,000 Units by mouth every morning.   •  Fluticasone Furoate-Vilanterol (BREO ELLIPTA) 200-25 MCG/INH aerosol powder , Inhale 1 puff Daily.,   •  folic acid 1 mg by mouth every morning.  •  furosemide 40 mg by mouth every morning  •  metoprolol tartrate 25 MG tablet, Take 12.5 mg by mouth 2 (two) times a day.   •  potassium chloride 10 mEq by mouth Daily  •  vitamin B-12 2,500 mcg by mouth daily  •  polyethylene glycol 17 g by mouth As Needed.  •  albuterol inhaler 2 puff, 2 puff, Inhalation, Q4H PRN    History " "of Present Illness:         Mr. Craven returns today for yearly follow up. He reports his shortness of breath has gotten worse in the past few weeks and especially this week. He is not very active and gets dyspnea with any activity. He continues to follow with Pulmonary for his COPD and ANN MARIE and notes on most recent office visit that he has had no change in his pulmonary status. He is also currently undergoing evaluation for possible cirrhosis of the liver by  Hepatology. He states he is due to get a liver biopsy soon. Denies angina, palpitations, or syncope. No orthopnea or PND.     The following portions of the patient's history were reviewed and updated as appropriate: allergies, current medications and problem list.    ROS: Pertinent positives as listed in the HPI.  All other systems reviewed and negative.    Objective:  Vitals:    07/24/18 1339 07/24/18 1341   BP: 136/85 155/78   BP Location: Right arm Right arm   Patient Position: Sitting Standing   Pulse: 59    Weight: 110 kg (241 lb 12.8 oz)    Height: 177.8 cm (70\")      Physical Exam:  GENERAL: Alert, cooperative, in no acute distress.   HEENT: Normocephalic, no adenopathy, + jugular venous distention  HEART: No discrete PMI is noted. Irregular rhythm, normal rate, 2/6 murmur, gallops, or rubs.   LUNGS: Diminished breath sounds. No wheezing, rales or ronchi.  ABDOMEN: Distended, bowel sounds present, non-tender  NEUROLOGIC: No focal abnormalities involving strength or sensation are noted.   EXTREMITIES: No clubbing, cyanosis, 2+ pre-tibial edema bilaterally noted.     Diagnostic Data:  No new labs available to review.    Procedures    Assessment and Plan:   1.  VHD: he has moderate to severe MR by history, however with worsening dyspnea, +JVD and lower extremity edema, we will obtain an echo today to re-look mitral valve. We will call him with these results and further recommendations.   2.  HTN: controlled.    3.  PAF: continue Eliquis for stroke " prevention. He is asymptomatic and rate is controlled.   4.  Possible cirrhosis: We do not think this is cardiac cirrhosis. This is currently under evaluation by [facility] with planned biopsy upcoming.     I will see Smith Craven back in one year or sooner on an as needed basis.     Scribed for Brant Napier MD by Hattie Eduardo PA-C. 07/24/2018 1:37 PM.  I, Brant Napier MD, [facility], [facility], personally performed the services described in this documentation as scribed by the above named individual in my presence, and it is both accurate and complete. At 5:01 PM on 07/24/2018      EMR Dragon/Transcription Disclaimer:  Much of this encounter note is an electronic transcription/translation of spoken language to printed text.  The electronic translation of spoken language may permit erroneous, or at times, nonsensical words or phrases to be inadvertently transcribed.  Although I have reviewed the note for such errors, some may still exist.

## 2018-07-26 ENCOUNTER — TELEPHONE (OUTPATIENT)
Dept: CARDIOLOGY | Facility: CLINIC | Age: 68
End: 2018-07-26

## 2018-07-26 NOTE — TELEPHONE ENCOUNTER
Pt's wife aware there was no issues with right sided heart failure that can affect the liver. Pulm HTN noted however more likely to cause shortness of breath. EF normal. She understands this and will continue with liver workup. YASHIRA

## 2018-10-02 ENCOUNTER — OFFICE VISIT (OUTPATIENT)
Dept: PULMONOLOGY | Facility: CLINIC | Age: 68
End: 2018-10-02

## 2018-10-02 VITALS
BODY MASS INDEX: 35.05 KG/M2 | OXYGEN SATURATION: 97 % | WEIGHT: 244.8 LBS | HEART RATE: 66 BPM | SYSTOLIC BLOOD PRESSURE: 138 MMHG | HEIGHT: 70 IN | RESPIRATION RATE: 16 BRPM | DIASTOLIC BLOOD PRESSURE: 78 MMHG | TEMPERATURE: 97 F

## 2018-10-02 DIAGNOSIS — I34.0 MITRAL VALVE INSUFFICIENCY, UNSPECIFIED ETIOLOGY: ICD-10-CM

## 2018-10-02 DIAGNOSIS — J44.9 CHRONIC OBSTRUCTIVE PULMONARY DISEASE, UNSPECIFIED COPD TYPE (HCC): ICD-10-CM

## 2018-10-02 DIAGNOSIS — J44.1 COPD EXACERBATION (HCC): ICD-10-CM

## 2018-10-02 DIAGNOSIS — R06.02 SOB (SHORTNESS OF BREATH): Primary | ICD-10-CM

## 2018-10-02 PROCEDURE — 94618 PULMONARY STRESS TESTING: CPT | Performed by: NURSE PRACTITIONER

## 2018-10-02 PROCEDURE — 99214 OFFICE O/P EST MOD 30 MIN: CPT | Performed by: NURSE PRACTITIONER

## 2018-10-02 RX ORDER — PREDNISONE 10 MG/1
TABLET ORAL
Qty: 30 TABLET | Refills: 0 | Status: SHIPPED | OUTPATIENT
Start: 2018-10-02 | End: 2018-12-14

## 2018-10-02 RX ORDER — DOXYCYCLINE 50 MG/1
100 CAPSULE ORAL EVERY 12 HOURS SCHEDULED
Qty: 40 CAPSULE | Refills: 0 | Status: SHIPPED | OUTPATIENT
Start: 2018-10-02 | End: 2018-12-14

## 2018-10-02 NOTE — PROGRESS NOTES
[facility] Pulmonary Follow up    CHIEF COMPLAINT    dyspnea    HISTORY OF PRESENT ILLNESS    Smith Craven is a 67 y.o.male here today for follow-up on his dyspnea.  He was last seen in our office in March 2018 for similar symptoms.  Since this visit his dyspnea has not changed much.  He states his dyspnea is worse with activity and he has to take frequent breaks.  He recovers quickly when he rests.  He denies chest pain or palpitations during these episodes.    He denies fever, chills, weight loss, sputum production, or hemoptysis.  He states he has no allergy or reflux symptoms.      He uses Breo daily, and occasionally uses his rescue inhaler.  He takes one albuterol nebulizer a day.  He has been wearing his CPAP for about a week and wears it for 6-7 hours each night.  He doesn't notice a huge difference when wearing it.       He is being followed by a liver specialist, and had a liver biopsy recently for jaundice.  He has not heard the results of this test yet.     He is also being followed by Dr. Napier for his chronic atrial fibrillation.  He does not know when he is in or out of rhythm.  He had an ECHO in July 2018.  His wife has accompanied him today.      Patient Active Problem List   Diagnosis   • Non-obstructive coronary artery disease   • Atrial fibrillation (CMS/HCC)   • Neurocardiogenic syncope   • SOB (shortness of breath)   • Calculus of gallbladder with biliary obstruction but without cholecystitis   • Cholelithiasis   • Intractable nausea and vomiting   • Bronchitis   • Cough   • Jaundice   • Obesity (BMI 30-39.9)   • Iron deficiency anemia   • Reactive airway disease   • ANN MARIE on CPAP   • Peripheral venous insufficiency   • COPD (chronic obstructive pulmonary disease) (CMS/HCC)   • Heart failure (CMS/HCC)   • Mitral valve regurgitation   • Gilbert's syndrome       No Known Allergies    Current Outpatient Prescriptions:   •  apixaban (ELIQUIS) 5 MG tablet tablet, Take 5 mg by mouth 2 (two) times a  day., Disp: , Rfl:   •  cholecalciferol (VITAMIN D3) 1000 UNITS tablet, Take 5,000 Units by mouth every morning., Disp: , Rfl:   •  Fluticasone Furoate-Vilanterol (BREO ELLIPTA) 200-25 MCG/INH aerosol powder , Inhale 1 puff Daily., Disp: 2 each, Rfl: 0  •  folic acid (FOLVITE) 1 MG tablet, Take 1 mg by mouth every morning., Disp: , Rfl:   •  furosemide (LASIX) 40 MG tablet, Take 40 mg by mouth every morning., Disp: , Rfl:   •  metoprolol tartrate (LOPRESSOR) 25 MG tablet, Take 12.5 mg by mouth 2 (two) times a day., Disp: , Rfl:   •  mupirocin (BACTROBAN) 2 % ointment, APPLY SPARINGLY TO THE AFFECTED AREA TWO TIMES A DAY, Disp: , Rfl: 5  •  polyethylene glycol (MIRALAX) powder, Take 17 g by mouth As Needed., Disp: , Rfl:   •  potassium chloride (K-DUR,KLOR-CON) 10 MEQ CR tablet, Take 10 mEq by mouth Daily., Disp: , Rfl:   •  vitamin B-12 (CYANOCOBALAMIN) 1000 MCG tablet, Take 2,500 mcg by mouth daily., Disp: , Rfl:   •  doxycycline (MONODOX) 50 MG capsule, Take 2 capsules by mouth Every 12 (Twelve) Hours., Disp: 40 capsule, Rfl: 0  •  predniSONE (DELTASONE) 10 MG tablet, Take 4 tabs daily x 3 days, then take 3 tabs daily x 3 days, then take 2 tabs daily x 3 days, then take 1 tab daily x 3 days, Disp: 30 tablet, Rfl: 0    Current Facility-Administered Medications:   •  albuterol (PROVENTIL HFA;VENTOLIN HFA) inhaler 2 puff, 2 puff, Inhalation, Q4H PRN, Yossi Blake MD  MEDICATION LIST AND ALLERGIES REVIEWED.    Social History   Substance Use Topics   • Smoking status: Never Smoker   • Smokeless tobacco: Never Used   • Alcohol use No       FAMILY AND SOCIAL HISTORY REVIEWED.    Review of Systems   Constitutional: Positive for fatigue. Negative for activity change, appetite change, fever and unexpected weight change.   HENT: Negative for congestion, postnasal drip, rhinorrhea, sinus pressure, sore throat and voice change.    Eyes: Negative for visual disturbance.   Respiratory: Positive for shortness of  "breath. Negative for cough, chest tightness and wheezing.    Cardiovascular: Negative for chest pain, palpitations and leg swelling.   Gastrointestinal: Negative for abdominal distention, abdominal pain, nausea and vomiting.   Endocrine: Negative for cold intolerance and heat intolerance.   Genitourinary: Negative for difficulty urinating and urgency.   Musculoskeletal: Negative for arthralgias, back pain and neck pain.   Skin: Negative for color change and pallor.   Allergic/Immunologic: Negative for environmental allergies and food allergies.   Neurological: Positive for weakness. Negative for dizziness, syncope and light-headedness.   Hematological: Negative for adenopathy. Does not bruise/bleed easily.   Psychiatric/Behavioral: Negative for agitation and behavioral problems.   .    /78   Pulse 66   Temp 97 °F (36.1 °C)   Resp 16   Ht 177.8 cm (70\")   Wt 111 kg (244 lb 12.8 oz)   SpO2 97% Comment: RA@rest  BMI 35.13 kg/m²       There is no immunization history on file for this patient.    Physical Exam   Constitutional: He is oriented to person, place, and time. He appears well-developed and well-nourished.   HENT:   Head: Normocephalic and atraumatic.   Eyes: Pupils are equal, round, and reactive to light.   Neck: Normal range of motion. Neck supple. No thyromegaly present.   Cardiovascular: Normal rate, regular rhythm, normal heart sounds and intact distal pulses.  Exam reveals no gallop and no friction rub.    No murmur heard.  Pulmonary/Chest: Effort normal and breath sounds normal. No respiratory distress. He has no wheezes. He has no rales. He exhibits no tenderness.   Abdominal: Soft. Bowel sounds are normal. There is no tenderness.   Musculoskeletal: Normal range of motion.   Lymphadenopathy:     He has no cervical adenopathy.   Neurological: He is alert and oriented to person, place, and time.   Skin: Skin is warm and dry. Capillary refill takes less than 2 seconds. He is not diaphoretic. "   Psychiatric: He has a normal mood and affect. His behavior is normal.   Nursing note and vitals reviewed.        RESULTS    6MWT- He walked for 1 minute without oxygen and his oxygen saturations dropped to 86%, he was placed on 2L PD and was not able to maintain a O2 sat about 87%, he was placed on 2L continuous and maintained 89-94% for duration of test.  He walked 1100 feet for testing.       PROBLEM LIST    Problem List Items Addressed This Visit        Cardiovascular and Mediastinum    Mitral valve regurgitation       Respiratory    SOB (shortness of breath) - Primary    Relevant Medications    predniSONE (DELTASONE) 10 MG tablet    doxycycline (MONODOX) 50 MG capsule    Other Relevant Orders    Walking Oximetry (Completed)    Overnight Sleep Oximetry Study    COPD (chronic obstructive pulmonary disease) (CMS/[facility])    Overview     Mild to moderate obstruction on Oct 2016 PFTs         Relevant Medications    predniSONE (DELTASONE) 10 MG tablet    Fluticasone Furoate-Vilanterol (BREO ELLIPTA) 200-25 MCG/INH aerosol powder       Other Visit Diagnoses     COPD exacerbation (CMS/[facility])        Relevant Medications    predniSONE (DELTASONE) 10 MG tablet    doxycycline (MONODOX) 50 MG capsule    Fluticasone Furoate-Vilanterol (BREO ELLIPTA) 200-25 MCG/INH aerosol powder             DISCUSSION    Mr. Craevn was here for worsening dyspnea on exertion.  He feels that his dyspnea has worsened over the last couple of weeks.  He does have multifactorial problems that could be causing his dyspnea.  He is being followed by a hematologist for his anemia, and a liver specialist as well.  His 6MWT does qualify him for oxygen with activity.  I will get this set up for him today.  He verbalized understanding.    He will continue to wear his CPAP nightly.  I will also order an overnight pulse oximeter to see if he needs oxygen with his CPAP.  I explained to him how the test is performed.  He is agreeable.    He will continue his Breo  daily, and use his nebulizer's morning and night as well.  I advised him to use his rescue inhaler before activity to help his dyspnea.      I will call in an antibiotic and steroid taper for him to use, he does not appear infected today but this may be the beginning of an infection.  I advised him to hold off on the antibiotic until he develops a fever or colored sputum.  He is agreeable.      He will follow up in 1 month for recheck or sooner if his symptoms worsen.    I spent 25 minutes with the patient and family. I spent > 50% percent of this time counseling and discussing diagnosis, prognosis, diagnostic testing, evaluation, current status, treatment options and management.    Myrna Thorne, SYDNEE  10/02/58871:18 PM  Electronically signed     Please note that portions of this note were completed with a voice recognition program. Efforts were made to edit the dictations, but occasionally words are mistranscribed.      CC: Jeane Baird MD

## 2018-10-04 ENCOUNTER — TELEPHONE (OUTPATIENT)
Dept: PULMONOLOGY | Facility: CLINIC | Age: 68
End: 2018-10-04

## 2018-10-04 NOTE — TELEPHONE ENCOUNTER
Patients wife called wanting to know if we can get him a POC ordered. I took her information and passed it to E.CSky Because she wrote the order.

## 2018-10-15 DIAGNOSIS — J44.9 CHRONIC OBSTRUCTIVE PULMONARY DISEASE, UNSPECIFIED COPD TYPE (HCC): ICD-10-CM

## 2018-10-15 NOTE — TELEPHONE ENCOUNTER
Pt LVM requesting samples of Rx Breo 200. Pt notified that samples are placed up front. Pt verbalized understanding and appreciation.

## 2018-12-14 ENCOUNTER — APPOINTMENT (OUTPATIENT)
Dept: GENERAL RADIOLOGY | Facility: HOSPITAL | Age: 68
End: 2018-12-14

## 2018-12-14 ENCOUNTER — APPOINTMENT (OUTPATIENT)
Dept: ULTRASOUND IMAGING | Facility: HOSPITAL | Age: 68
End: 2018-12-14

## 2018-12-14 ENCOUNTER — HOSPITAL ENCOUNTER (INPATIENT)
Facility: HOSPITAL | Age: 68
LOS: 4 days | Discharge: HOME | End: 2018-12-18
Attending: EMERGENCY MEDICINE | Admitting: INTERNAL MEDICINE

## 2018-12-14 DIAGNOSIS — J44.1 COPD, FREQUENT EXACERBATIONS (HCC): Primary | ICD-10-CM

## 2018-12-14 DIAGNOSIS — J96.21 ACUTE ON CHRONIC RESPIRATORY FAILURE WITH HYPOXIA (HCC): ICD-10-CM

## 2018-12-14 DIAGNOSIS — E80.4 GILBERT'S SYNDROME: ICD-10-CM

## 2018-12-14 PROBLEM — R14.0 ABDOMINAL DISTENTION: Status: ACTIVE | Noted: 2018-12-14

## 2018-12-14 PROBLEM — E80.6 HYPERBILIRUBINEMIA: Status: ACTIVE | Noted: 2018-12-14

## 2018-12-14 PROBLEM — J90 PLEURAL EFFUSION, BILATERAL: Status: ACTIVE | Noted: 2018-12-14

## 2018-12-14 PROBLEM — D73.2 SPLENOMEGALY, CONGESTIVE, CHRONIC: Status: ACTIVE | Noted: 2018-12-14

## 2018-12-14 LAB
ALBUMIN SERPL-MCNC: 4.4 G/DL (ref 3.2–4.8)
ALBUMIN/GLOB SERPL: 1.8 G/DL (ref 1.5–2.5)
ALP SERPL-CCNC: 44 U/L (ref 25–100)
ALT SERPL W P-5'-P-CCNC: 16 U/L (ref 7–40)
ANION GAP SERPL CALCULATED.3IONS-SCNC: 9 MMOL/L (ref 3–11)
APTT PPP: 28.9 SECONDS (ref 24–31)
AST SERPL-CCNC: 31 U/L (ref 0–33)
ATMOSPHERIC PRESS: ABNORMAL MMHG
BASE EXCESS BLDV CALC-SCNC: -1.3 MMOL/L (ref -2–2)
BASOPHILS # BLD AUTO: 0.01 10*3/MM3 (ref 0–0.2)
BASOPHILS NFR BLD AUTO: 0.1 % (ref 0–1)
BDY SITE: ABNORMAL
BILIRUB SERPL-MCNC: 6.7 MG/DL (ref 0.3–1.2)
BNP SERPL-MCNC: 421 PG/ML (ref 0–100)
BODY TEMPERATURE: 37 C
BUN BLD-MCNC: 17 MG/DL (ref 9–23)
BUN/CREAT SERPL: 15.3 (ref 7–25)
CALCIUM SPEC-SCNC: 9.1 MG/DL (ref 8.7–10.4)
CHLORIDE SERPL-SCNC: 107 MMOL/L (ref 99–109)
CO2 SERPL-SCNC: 22 MMOL/L (ref 20–31)
COHGB MFR BLD: 3.8 %
CREAT BLD-MCNC: 1.11 MG/DL (ref 0.6–1.3)
DEPRECATED RDW RBC AUTO: 79 FL (ref 37–54)
EOSINOPHIL # BLD AUTO: 0.01 10*3/MM3 (ref 0–0.3)
EOSINOPHIL NFR BLD AUTO: 0.1 % (ref 0–3)
ERYTHROCYTE [DISTWIDTH] IN BLOOD BY AUTOMATED COUNT: 22.3 % (ref 11.3–14.5)
GFR SERPL CREATININE-BSD FRML MDRD: 65 ML/MIN/1.73
GLOBULIN UR ELPH-MCNC: 2.5 GM/DL
GLUCOSE BLD-MCNC: 120 MG/DL (ref 70–100)
HCO3 BLDV-SCNC: 23 MMOL/L (ref 22–28)
HCT VFR BLD AUTO: 35 % (ref 38.9–50.9)
HGB BLD-MCNC: 11.3 G/DL (ref 13.1–17.5)
HGB BLDA-MCNC: 13.2 G/DL (ref 13.5–17.5)
HOLD SPECIMEN: NORMAL
HOLD SPECIMEN: NORMAL
HOROWITZ INDEX BLD+IHG-RTO: 28 %
IMM GRANULOCYTES # BLD: 0.05 10*3/MM3 (ref 0–0.03)
IMM GRANULOCYTES NFR BLD: 0.6 % (ref 0–0.6)
INR PPP: 1.14 (ref 0.91–1.09)
LYMPHOCYTES # BLD AUTO: 0.52 10*3/MM3 (ref 0.6–4.8)
LYMPHOCYTES NFR BLD AUTO: 6.3 % (ref 24–44)
MCH RBC QN AUTO: 32.2 PG (ref 27–31)
MCHC RBC AUTO-ENTMCNC: 32.3 G/DL (ref 32–36)
MCV RBC AUTO: 99.7 FL (ref 80–99)
METHGB BLD QL: 0.7 %
MODALITY: ABNORMAL
MONOCYTES # BLD AUTO: 0.14 10*3/MM3 (ref 0–1)
MONOCYTES NFR BLD AUTO: 1.7 % (ref 0–12)
NEUTROPHILS # BLD AUTO: 7.58 10*3/MM3 (ref 1.5–8.3)
NEUTROPHILS NFR BLD AUTO: 91.8 % (ref 41–71)
NOTE: ABNORMAL
OXYHGB MFR BLDV: 50 %
PCO2 BLDV: 36.6 MM HG (ref 41–51)
PH BLDV: 7.41 PH UNITS
PLATELET # BLD AUTO: 224 10*3/MM3 (ref 150–450)
PMV BLD AUTO: 9.8 FL (ref 6–12)
PO2 BLDV: 31.1 MM HG (ref 27–53)
POTASSIUM BLD-SCNC: 3.8 MMOL/L (ref 3.5–5.5)
PROT SERPL-MCNC: 6.9 G/DL (ref 5.7–8.2)
PROTHROMBIN TIME: 12 SECONDS (ref 9.6–11.5)
RBC # BLD AUTO: 3.51 10*6/MM3 (ref 4.2–5.76)
SAO2 % BLDCOV: 52.4 % (ref 45–75)
SODIUM BLD-SCNC: 138 MMOL/L (ref 132–146)
TROPONIN I SERPL-MCNC: 0 NG/ML (ref 0–0.07)
TROPONIN I SERPL-MCNC: 0.03 NG/ML
VENTILATOR MODE: ABNORMAL
WBC NRBC COR # BLD: 8.26 10*3/MM3 (ref 3.5–10.8)
WHOLE BLOOD HOLD SPECIMEN: NORMAL
WHOLE BLOOD HOLD SPECIMEN: NORMAL

## 2018-12-14 PROCEDURE — 85730 THROMBOPLASTIN TIME PARTIAL: CPT | Performed by: NURSE PRACTITIONER

## 2018-12-14 PROCEDURE — 76705 ECHO EXAM OF ABDOMEN: CPT

## 2018-12-14 PROCEDURE — 85025 COMPLETE CBC W/AUTO DIFF WBC: CPT | Performed by: EMERGENCY MEDICINE

## 2018-12-14 PROCEDURE — 80053 COMPREHEN METABOLIC PANEL: CPT | Performed by: EMERGENCY MEDICINE

## 2018-12-14 PROCEDURE — 84484 ASSAY OF TROPONIN QUANT: CPT | Performed by: EMERGENCY MEDICINE

## 2018-12-14 PROCEDURE — 94640 AIRWAY INHALATION TREATMENT: CPT

## 2018-12-14 PROCEDURE — 84484 ASSAY OF TROPONIN QUANT: CPT

## 2018-12-14 PROCEDURE — 25010000002 FUROSEMIDE PER 20 MG: Performed by: NURSE PRACTITIONER

## 2018-12-14 PROCEDURE — 71046 X-RAY EXAM CHEST 2 VIEWS: CPT

## 2018-12-14 PROCEDURE — 99284 EMERGENCY DEPT VISIT MOD MDM: CPT

## 2018-12-14 PROCEDURE — 85610 PROTHROMBIN TIME: CPT | Performed by: NURSE PRACTITIONER

## 2018-12-14 PROCEDURE — 93005 ELECTROCARDIOGRAM TRACING: CPT | Performed by: EMERGENCY MEDICINE

## 2018-12-14 PROCEDURE — 82820 HEMOGLOBIN-OXYGEN AFFINITY: CPT

## 2018-12-14 PROCEDURE — 99222 1ST HOSP IP/OBS MODERATE 55: CPT | Performed by: FAMILY MEDICINE

## 2018-12-14 PROCEDURE — 82805 BLOOD GASES W/O2 SATURATION: CPT

## 2018-12-14 PROCEDURE — 83880 ASSAY OF NATRIURETIC PEPTIDE: CPT | Performed by: EMERGENCY MEDICINE

## 2018-12-14 RX ORDER — BUDESONIDE AND FORMOTEROL FUMARATE DIHYDRATE 80; 4.5 UG/1; UG/1
2 AEROSOL RESPIRATORY (INHALATION)
Status: DISCONTINUED | OUTPATIENT
Start: 2018-12-14 | End: 2018-12-18 | Stop reason: HOSPADM

## 2018-12-14 RX ORDER — IPRATROPIUM BROMIDE AND ALBUTEROL SULFATE 2.5; .5 MG/3ML; MG/3ML
3 SOLUTION RESPIRATORY (INHALATION) ONCE
Status: COMPLETED | OUTPATIENT
Start: 2018-12-14 | End: 2018-12-14

## 2018-12-14 RX ORDER — SODIUM CHLORIDE 0.9 % (FLUSH) 0.9 %
10 SYRINGE (ML) INJECTION AS NEEDED
Status: DISCONTINUED | OUTPATIENT
Start: 2018-12-14 | End: 2018-12-18 | Stop reason: HOSPADM

## 2018-12-14 RX ORDER — PREDNISONE 1 MG/1
5 TABLET ORAL 2 TIMES DAILY
COMMUNITY
Start: 2018-12-12 | End: 2018-12-21

## 2018-12-14 RX ORDER — SODIUM CHLORIDE 0.9 % (FLUSH) 0.9 %
3-10 SYRINGE (ML) INJECTION AS NEEDED
Status: DISCONTINUED | OUTPATIENT
Start: 2018-12-14 | End: 2018-12-18 | Stop reason: HOSPADM

## 2018-12-14 RX ORDER — POTASSIUM CHLORIDE 750 MG/1
10 CAPSULE, EXTENDED RELEASE ORAL DAILY
Status: DISCONTINUED | OUTPATIENT
Start: 2018-12-14 | End: 2018-12-17

## 2018-12-14 RX ORDER — FUROSEMIDE 10 MG/ML
40 INJECTION INTRAMUSCULAR; INTRAVENOUS DAILY
Status: DISCONTINUED | OUTPATIENT
Start: 2018-12-14 | End: 2018-12-16

## 2018-12-14 RX ORDER — SODIUM CHLORIDE 0.9 % (FLUSH) 0.9 %
3 SYRINGE (ML) INJECTION EVERY 12 HOURS SCHEDULED
Status: DISCONTINUED | OUTPATIENT
Start: 2018-12-14 | End: 2018-12-18 | Stop reason: HOSPADM

## 2018-12-14 RX ADMIN — FUROSEMIDE 40 MG: 10 INJECTION, SOLUTION INTRAMUSCULAR; INTRAVENOUS at 21:38

## 2018-12-14 RX ADMIN — POTASSIUM CHLORIDE 10 MEQ: 750 CAPSULE, EXTENDED RELEASE ORAL at 21:38

## 2018-12-14 RX ADMIN — METOPROLOL TARTRATE 12.5 MG: 25 TABLET, FILM COATED ORAL at 21:35

## 2018-12-14 RX ADMIN — APIXABAN 5 MG: 5 TABLET, FILM COATED ORAL at 21:35

## 2018-12-14 RX ADMIN — SODIUM CHLORIDE, PRESERVATIVE FREE 3 ML: 5 INJECTION INTRAVENOUS at 21:39

## 2018-12-14 RX ADMIN — IPRATROPIUM BROMIDE AND ALBUTEROL SULFATE 3 ML: 2.5; .5 SOLUTION RESPIRATORY (INHALATION) at 13:37

## 2018-12-14 NOTE — ED NOTES
Unable to obtain records due to office hours. Pt is being admitted      Tayla Yanez  12/14/18 5171

## 2018-12-14 NOTE — H&P
[facility] Medicine Services  HISTORY AND PHYSICAL    Patient Name: Smith Craven  : 1950  MRN: [id number]  Primary Care Physician: Jeane Baird MD  Date of admission: 2018      Subjective   Subjective     Chief Complaint:  SOB    HPI:  Smith Craven is a 68 y.o. male who has been experiencing SOB and lower extremity edema for 2 weeks, worse in the last few days.  Has been taking nebs and took steroids this am without relief.  No f/c/s.  States he feels he has lot of abdominal swelling but denies ever having had a paracentesis.      Review of Systems   Constitutional: Positive for activity change and fatigue. Negative for fever.   HENT: Negative for hearing loss.    Eyes: Negative for visual disturbance.   Respiratory: Positive for cough and shortness of breath.    Cardiovascular: Positive for leg swelling. Negative for chest pain.   Gastrointestinal: Positive for abdominal distention and diarrhea. Negative for abdominal pain, constipation, nausea and vomiting.   Genitourinary: Negative for dysuria.        Urine dark   Musculoskeletal: Negative for back pain.   Skin: Negative for rash.   Neurological: Negative for dizziness and weakness.        Otherwise 10-system ROS reviewed and is negative except as mentioned in the HPI.    Personal History     Past Medical History:   Diagnosis Date   • A-fib (CMS/[facility])    • Anemia    • Arthritis    • Belching    • CHF (congestive heart failure) (CMS/[facility])    • Cholelithiasis    • COPD (chronic obstructive pulmonary disease) (CMS/[facility]) 10/26/2016   • Coronary artery disease    • Cough     PRODUCTIVE   • Elevated bilirubin    • Elevated LFTs    • Gastritis     H/ pylori gastritis   • H/O echocardiogram 2015    Study quality good. LV chamber size is midly dilated. Est EF 50-55%, Left atrium mod dilated. A patent marin ovale is not demonstrated with color doppler and agitated saline contrast, aortic valve leaflets mildly thickened,  trace of aorta reg., mod mitral reg., mild tricuspid reg., RVSP 40mmHg, Trivial pericardial effusion visualized   • History of bleeding ulcers     2003   • History of transfusion    • Pneumonia     [id number]   • Reactive airway disease    • Shortness of breath        Past Surgical History:   Procedure Laterality Date   • CARDIAC CATHETERIZATION  06/17/2016    PER DR. GRAY   • CATARACT EXTRACTION Right 2016   • CHOLECYSTECTOMY  08/2016   • COLONOSCOPY     • FRACTURE SURGERY     • JOINT REPLACEMENT     • LIVER BIOPSY  08/2018   • OTHER SURGICAL HISTORY      Cellulitis of left lug summer 2015   • REPLACEMENT TOTAL KNEE      left   • SKIN BIOPSY     • SKIN CANCER EXCISION  2015    REMOVED FROM FACE   • VEIN LIGATION AND STRIPPING WITH LASER         Family History: family history includes Arthritis in his brother, other, and sister; Atrial fibrillation in his mother; COPD in his sister; Coronary artery disease in his father; Dementia in his mother; Diverticulitis in his brother; Hypertension in his brother, mother, and other. Otherwise pertinent FHx was reviewed and unremarkable.     Social History:  reports that  has never smoked. he has never used smokeless tobacco. He reports that he does not drink alcohol or use drugs.  Social History     Social History Narrative    Lives in [place]     Still works daily       Medications:  Available home medication information reviewed.    No Known Allergies    Objective   Objective     Vital Signs:   Temp:  [98.5 °F (36.9 °C)] 98.5 °F (36.9 °C)  Heart Rate:  [78-81] 78  Resp:  [24] 24  BP: (117-151)/(65-78) 123/67        Physical Exam   Constitutional: No acute distress, awake, alert  Eyes: PERRLA, sclerae icteric, no conjunctival injection  HENT: NCAT, mucous membranes moist  Neck: Supple, no thyromegaly, no lymphadenopathy, trachea midline  Respiratory: Clear to auscultation bilaterally, nonlabored respirations.  No wheezing/rhonchi noted  Cardiovascular: irregular, no murmurs,  rubs, or gallops, palpable pedal pulses bilaterally  Gastrointestinal: Positive bowel sounds, soft, nontender, distended, obese  Musculoskeletal: 2+ bilateral ankle edema, no clubbing or cyanosis to extremities  Psychiatric: Appropriate affect, cooperative  Neurologic: Oriented x 3, strength symmetric in all extremities, speech clear  Skin: No rashes    Results Reviewed:  I have personally reviewed current lab, radiology, and data and agree.    Results from last 7 days   Lab Units  12/14/18   1225   WBC 10*3/mm3  8.26   HEMOGLOBIN g/dL  11.3*   HEMATOCRIT %  35.0*   PLATELETS 10*3/mm3  224     Results from last 7 days   Lab Units  12/14/18   1225   SODIUM mmol/L  138   POTASSIUM mmol/L  3.8   CHLORIDE mmol/L  107   CO2 mmol/L  22.0   BUN mg/dL  17   CREATININE mg/dL  1.11   GLUCOSE mg/dL  120*   CALCIUM mg/dL  9.1   ALT (SGPT) U/L  16   AST (SGOT) U/L  31   TROPONIN I ng/mL  0.030     Estimated Creatinine Clearance: 76 mL/min (by C-G formula based on SCr of 1.11 mg/dL).  Brief Urine Lab Results     None        BNP   Date Value Ref Range Status   12/14/2018 421.0 (H) 0.0 - 100.0 pg/mL Final     Comment:     Results may be falsely decreased if patient taking Biotin.     Imaging Results (last 24 hours)     Procedure Component Value Units Date/Time    XR Chest 2 View [[id number]] Collected:  12/14/18 1308     Updated:  12/14/18 1332    Narrative:       EXAMINATION: XR CHEST 2 VW- 12/14/2018     INDICATION: dyspnea     TECHNIQUE: Two view chest.     COMPARISONS: 09/22/2017     FINDINGS: Small volume bilateral pleural effusions and adjacent opacity.  Prominent cardiopericardial silhouette. Atherosclerosis aortic knob. No  pneumothorax.       Impression:       Small volume bilateral pleural effusions.     D:  12/14/2018  E:  12/14/2018     This report was finalized on 12/14/2018 1:30 PM by Uriel Mcneal.           Results for orders placed in visit on 07/24/18   Adult Transthoracic Echo Complete W/ Cont if Necessary Per  Protocol    Narrative · There is severe left atrial enlargement.  · Global and segmental LV wall motion is normal. The calculated EF is 64%.  · Right atrium and ventricle are normal in size.  · RV systolic function is normal. There is no evidence of volume or   pressure overload.  · There is no evidence of pulmonary artery hypertension. There is no   tricuspid regurgitation.  · There is mitral annular calcification and moderate - severe PA   hypertension.  · Aortic valve sclerosis is present.  · There is a trivial circumferential pericardial effusion.          Assessment/Plan   Assessment / Plan     Active Hospital Problems    Diagnosis   • **SOB (shortness of breath)   • Abdominal distention   • Pleural effusion, bilateral   • Hyperbilirubinemia   • Splenomegaly, congestive, chronic   • Gilbert's syndrome   • Heart failure (CMS/HCC)     LAUREN 6/20/2016 - EF 50%, moderate to severe MR       • COPD (chronic obstructive pulmonary disease) (CMS/HCC)     Mild to moderate obstruction on Oct 2016 PFTs     • ANN MARIE on CPAP     CPAP compliant     • Obesity (BMI 30-39.9)     BMI 35.5     • Atrial fibrillation (CMS/HCC)     a. CHADS2 = 0.  b. Event recorder, December 2007: Sinus bradycardia with heart rate in the 30 to 40 BPM range.    c. Holter monitor, 01/10/2013: Atrial fibrillation with ventricular response of  BPM and rare PACs.           Assessment & Plan:  --abdominal US  --diurese  --ECHO  --labs in am  --continue eliquis for a-fib  --obtain records from his hematologist re: his chronic liver/spleen issues.  Dr. Ramirez in [place]      DVT prophylaxis:  Eliquis    CODE STATUS:    Code Status and Medical Interventions:   Ordered at: 12/14/18 1632     Level Of Support Discussed With:    Patient     Code Status:    CPR     Medical Interventions (Level of Support Prior to Arrest):    Full           Electronically signed by SYDNEE Park, 12/14/18, 4:59 PM.          I seen and evaluated the patient.  I've performed a  independent history and physical examination.  I agree with phimosis and plan.    Yuriy Craven, DO  7:33 PM  12/14/18

## 2018-12-14 NOTE — ED PROVIDER NOTES
Subjective   Smith Craven is a 68 y.o.male who presents to the ED with his family with c/o dyspnea that worsened 2 days ago. He states that he is on 2L supplemental oxygen and wears a CPAP at night for ANN MARIE. He has a h/o COPD and coughs everyday and has a yellowish-brown sputum production but this is nothing new or different. He is chronically dyspneic but his dyspnea significantly worsened 2 days ago. He had been on Augmentin for a lower extremity cellulitis that is nearly resolved but stopped taking the antibiotics 3 days ago. He called his PCP who called in a prescription for 20 mg Prednisone which he has taken 4 doses of but denies any significant relief. He denies any chest pain, diaphoresis, nausea, vomiting or syncope. He has experienced abdominal distension for a long period of time and states that he has a watermelon in his abdomen. Per his family, he had a liver biopsy several months ago an was diagnosed with Gilbert's syndrome. He sees oncology for iron deficiency anemia. He denies a h/o MI but had a previous heart cath that was remarkable for vessel disease. He reports a h/o PNA and atrial fibrillation.          History provided by:  Patient  Shortness of Breath   Severity:  Moderate  Onset quality:  Gradual  Duration:  2 days  Timing:  Constant  Progression:  Worsening  Chronicity:  Chronic  Context comment:  COPD  Relieved by:  Oxygen  Worsened by:  Coughing and exertion  Ineffective treatments: steroids.  Associated symptoms: cough, sputum production and wheezing    Associated symptoms: no abdominal pain, no chest pain, no diaphoresis, no fever, no hemoptysis and no vomiting    Risk factors: obesity    Risk factors comment:  COPD, CHF      Review of Systems   Constitutional: Negative for diaphoresis and fever.   Respiratory: Positive for cough, sputum production, shortness of breath and wheezing. Negative for hemoptysis.    Cardiovascular: Positive for leg swelling. Negative for chest pain and  palpitations.   Gastrointestinal: Positive for abdominal distention. Negative for abdominal pain, blood in stool, constipation, diarrhea, nausea and vomiting.   All other systems reviewed and are negative.      Past Medical History:   Diagnosis Date   • A-fib (CMS/HCC)    • Anemia    • Arthritis    • Belching    • CHF (congestive heart failure) (CMS/HCC)    • Cholelithiasis    • COPD (chronic obstructive pulmonary disease) (CMS/[facility]) 10/26/2016   • Coronary artery disease    • Cough     PRODUCTIVE   • Elevated bilirubin    • Elevated LFTs    • Gastritis     H/ pylori gastritis   • H/O echocardiogram 12/03/2015    Study quality good. LV chamber size is midly dilated. Est EF 50-55%, Left atrium mod dilated. A patent marin ovale is not demonstrated with color doppler and agitated saline contrast, aortic valve leaflets mildly thickened, trace of aorta reg., mod mitral reg., mild tricuspid reg., RVSP 40mmHg, Trivial pericardial effusion visualized   • History of bleeding ulcers     2003   • History of transfusion    • Pneumonia     [id number]   • Shortness of breath        No Known Allergies    Past Surgical History:   Procedure Laterality Date   • CARDIAC CATHETERIZATION  06/17/2016    PER DR. GRAY   • CATARACT EXTRACTION Right 2016   • CHOLECYSTECTOMY  08/2016   • COLONOSCOPY     • FRACTURE SURGERY     • JOINT REPLACEMENT     • LIVER BIOPSY  08/2018   • OTHER SURGICAL HISTORY      Cellulitis of left lug summer 2015   • REPLACEMENT TOTAL KNEE      left   • SKIN BIOPSY     • SKIN CANCER EXCISION  2015    REMOVED FROM FACE   • VEIN LIGATION AND STRIPPING WITH LASER         Family History   Family history unknown: Yes   Problem Relation Age of Onset   • Hypertension Other    • Arthritis Other        Social History     Socioeconomic History   • Marital status:      Spouse name: Not on file   • Number of children: Not on file   • Years of education: Not on file   • Highest education level: Not on file   Tobacco Use    • Smoking status: Never Smoker   • Smokeless tobacco: Never Used   Substance and Sexual Activity   • Alcohol use: No   • Drug use: No   • Sexual activity: Defer     Partners: Female         Objective   Physical Exam   Constitutional: He is oriented to person, place, and time. He appears well-developed and well-nourished. No distress.   HENT:   Head: Normocephalic and atraumatic.   Right Ear: External ear normal.   Left Ear: External ear normal.   Nose: Nose normal.   Eyes: Conjunctivae are normal. Scleral icterus is present.   Neck: Normal range of motion. Neck supple. No JVD present.   Cardiovascular: Normal rate and normal heart sounds. An irregularly irregular rhythm present. Exam reveals no friction rub.   No murmur heard.  Pulmonary/Chest: Effort normal. No respiratory distress. He has decreased breath sounds. He has wheezes. He has no rales.   Markedly reduce air movement especially in the expiratory phase.   Mild wheezing.    Abdominal: Soft. Bowel sounds are normal. He exhibits no distension. There is tenderness.   LUQ pain especially under the costal margin.    Musculoskeletal: Normal range of motion. He exhibits edema. He exhibits no tenderness.   3+ pitting edema.    Lymphadenopathy:     He has no cervical adenopathy.   Neurological: He is alert and oriented to person, place, and time.   Skin: Skin is warm and dry. He is not diaphoretic.   Psychiatric: He has a normal mood and affect. His behavior is normal.   Nursing note and vitals reviewed.      Procedures         ED Course  ED Course as of Dec 14 1609   Fri Dec 14, 2018   1401 Dr. Marion reevaluated the patient. His air movement better after Duo-Neb and he appears better but states that he is still breathless.   [ML]   1511 Dr. Marion spoke with [facility] who will admit.   [ML]      ED Course User Index  [ML] Eliseo oNble     Recent Results (from the past 24 hour(s))   Light Blue Top    Collection Time: 12/14/18 12:25 PM   Result Value Ref Range     Extra Tube hold for add-on    Green Top (Gel)    Collection Time: 12/14/18 12:25 PM   Result Value Ref Range    Extra Tube Hold for add-ons.    Lavender Top    Collection Time: 12/14/18 12:25 PM   Result Value Ref Range    Extra Tube hold for add-on    Gold Top - SST    Collection Time: 12/14/18 12:25 PM   Result Value Ref Range    Extra Tube Hold for add-ons.    Comprehensive Metabolic Panel    Collection Time: 12/14/18 12:25 PM   Result Value Ref Range    Glucose 120 (H) 70 - 100 mg/dL    BUN 17 9 - 23 mg/dL    Creatinine 1.11 0.60 - 1.30 mg/dL    Sodium 138 132 - 146 mmol/L    Potassium 3.8 3.5 - 5.5 mmol/L    Chloride 107 99 - 109 mmol/L    CO2 22.0 20.0 - 31.0 mmol/L    Calcium 9.1 8.7 - 10.4 mg/dL    Total Protein 6.9 5.7 - 8.2 g/dL    Albumin 4.40 3.20 - 4.80 g/dL    ALT (SGPT) 16 7 - 40 U/L    AST (SGOT) 31 0 - 33 U/L    Alkaline Phosphatase 44 25 - 100 U/L    Total Bilirubin 6.7 (H) 0.3 - 1.2 mg/dL    eGFR Non African Amer 65 >60 mL/min/1.73    Globulin 2.5 gm/dL    A/G Ratio 1.8 1.5 - 2.5 g/dL    BUN/Creatinine Ratio 15.3 7.0 - 25.0    Anion Gap 9.0 3.0 - 11.0 mmol/L   Troponin    Collection Time: 12/14/18 12:25 PM   Result Value Ref Range    Troponin I 0.030 <=0.039 ng/mL   CBC Auto Differential    Collection Time: 12/14/18 12:25 PM   Result Value Ref Range    WBC 8.26 3.50 - 10.80 10*3/mm3    RBC 3.51 (L) 4.20 - 5.76 10*6/mm3    Hemoglobin 11.3 (L) 13.1 - 17.5 g/dL    Hematocrit 35.0 (L) 38.9 - 50.9 %    MCV 99.7 (H) 80.0 - 99.0 fL    MCH 32.2 (H) 27.0 - 31.0 pg    MCHC 32.3 32.0 - 36.0 g/dL    RDW 22.3 (H) 11.3 - 14.5 %    RDW-SD 79.0 (H) 37.0 - 54.0 fl    MPV 9.8 6.0 - 12.0 fL    Platelets 224 150 - 450 10*3/mm3    Neutrophil % 91.8 (H) 41.0 - 71.0 %    Lymphocyte % 6.3 (L) 24.0 - 44.0 %    Monocyte % 1.7 0.0 - 12.0 %    Eosinophil % 0.1 0.0 - 3.0 %    Basophil % 0.1 0.0 - 1.0 %    Immature Grans % 0.6 0.0 - 0.6 %    Neutrophils, Absolute 7.58 1.50 - 8.30 10*3/mm3    Lymphocytes, Absolute 0.52 (L) 0.60  - 4.80 10*3/mm3    Monocytes, Absolute 0.14 0.00 - 1.00 10*3/mm3    Eosinophils, Absolute 0.01 0.00 - 0.30 10*3/mm3    Basophils, Absolute 0.01 0.00 - 0.20 10*3/mm3    Immature Grans, Absolute 0.05 (H) 0.00 - 0.03 10*3/mm3   POC Troponin, Rapid    Collection Time: 12/14/18 12:26 PM   Result Value Ref Range    Troponin I 0.00 0.00 - 0.07 ng/mL   BNP    Collection Time: 12/14/18 12:59 PM   Result Value Ref Range    .0 (H) 0.0 - 100.0 pg/mL     Note: In addition to lab results from this visit, the labs listed above may include labs taken at another facility or during a different encounter within the last 24 hours. Please correlate lab times with ED admission and discharge times for further clarification of the services performed during this visit.    XR Chest 2 View   Final Result   Small volume bilateral pleural effusions.       D:  12/14/2018   E:  12/14/2018       This report was finalized on 12/14/2018 1:30 PM by Uriel Mcneal.            Vitals:    12/14/18 1337 12/14/18 1430 12/14/18 1530 12/14/18 1600   BP:  117/67 124/65 123/67   Pulse: 78      Resp:       Temp:       SpO2: 100% 91% 94% 93%   Weight:       Height:         Medications   sodium chloride 0.9 % flush 10 mL (not administered)   ipratropium-albuterol (DUO-NEB) nebulizer solution 3 mL (3 mL Nebulization Given 12/14/18 1337)     ECG/EMG Results (last 24 hours)     Procedure Component Value Units Date/Time    ECG 12 Lead [[id number]] Collected:  12/14/18 1221     Updated:  12/14/18 1222                        MDM    Final diagnoses:   COPD, frequent exacerbations (CMS/HCC)   Acute on chronic respiratory failure with hypoxia (CMS/HCC)   Gilbert's syndrome       Documentation assistance provided by joseph Noble.  Information recorded by the scribe was done at my direction and has been verified and validated by me.     Eliseo Noble  12/14/18 1668       Regan Marion MD  12/14/18 2487

## 2018-12-15 ENCOUNTER — APPOINTMENT (OUTPATIENT)
Dept: CARDIOLOGY | Facility: HOSPITAL | Age: 68
End: 2018-12-15

## 2018-12-15 PROBLEM — E87.6 HYPOKALEMIA: Status: ACTIVE | Noted: 2018-12-15

## 2018-12-15 PROBLEM — K76.0 FATTY LIVER: Status: ACTIVE | Noted: 2018-12-15

## 2018-12-15 PROBLEM — D63.8 ANEMIA, CHRONIC DISEASE: Status: ACTIVE | Noted: 2018-12-15

## 2018-12-15 PROBLEM — I34.0 MITRAL VALVE REGURGITATION: Status: ACTIVE | Noted: 2018-12-15

## 2018-12-15 LAB
ANION GAP SERPL CALCULATED.3IONS-SCNC: 7 MMOL/L (ref 3–11)
BASOPHILS # BLD AUTO: 0 10*3/MM3 (ref 0–0.2)
BASOPHILS NFR BLD AUTO: 0 % (ref 0–1)
BH CV ECHO MEAS - AO ROOT AREA (BSA CORRECTED): 1.4
BH CV ECHO MEAS - AO ROOT AREA: 8.3 CM^2
BH CV ECHO MEAS - AO ROOT DIAM: 3.2 CM
BH CV ECHO MEAS - BSA(HAYCOCK): 2.3 M^2
BH CV ECHO MEAS - BSA: 2.2 M^2
BH CV ECHO MEAS - BZI_BMI: 32.1 KILOGRAMS/M^2
BH CV ECHO MEAS - BZI_METRIC_HEIGHT: 180.3 CM
BH CV ECHO MEAS - BZI_METRIC_WEIGHT: 104.3 KG
BH CV ECHO MEAS - EDV(CUBED): 122.5 ML
BH CV ECHO MEAS - EDV(MOD-SP2): 143 ML
BH CV ECHO MEAS - EDV(MOD-SP4): 143 ML
BH CV ECHO MEAS - EDV(TEICH): 116.4 ML
BH CV ECHO MEAS - EF(CUBED): 62.2 %
BH CV ECHO MEAS - EF(MOD-BP): 54 %
BH CV ECHO MEAS - EF(MOD-SP2): 53.1 %
BH CV ECHO MEAS - EF(MOD-SP4): 53.1 %
BH CV ECHO MEAS - EF(TEICH): 53.5 %
BH CV ECHO MEAS - ESV(CUBED): 46.4 ML
BH CV ECHO MEAS - ESV(MOD-SP2): 67 ML
BH CV ECHO MEAS - ESV(MOD-SP4): 67 ML
BH CV ECHO MEAS - ESV(TEICH): 54.2 ML
BH CV ECHO MEAS - FS: 27.7 %
BH CV ECHO MEAS - IVS/LVPW: 1
BH CV ECHO MEAS - IVSD: 1.3 CM
BH CV ECHO MEAS - LA DIMENSION: 5.4 CM
BH CV ECHO MEAS - LA/AO: 1.7
BH CV ECHO MEAS - LAD MAJOR: 7.3 CM
BH CV ECHO MEAS - LAT PEAK E' VEL: 8.8 CM/SEC
BH CV ECHO MEAS - LATERAL E/E' RATIO: 13.4
BH CV ECHO MEAS - LV DIASTOLIC VOL/BSA (35-75): 63.9 ML/M^2
BH CV ECHO MEAS - LV MASS(C)D: 241.4 GRAMS
BH CV ECHO MEAS - LV MASS(C)DI: 107.9 GRAMS/M^2
BH CV ECHO MEAS - LV SYSTOLIC VOL/BSA (12-30): 29.9 ML/M^2
BH CV ECHO MEAS - LVIDD: 5 CM
BH CV ECHO MEAS - LVIDS: 3.6 CM
BH CV ECHO MEAS - LVLD AP2: 8.1 CM
BH CV ECHO MEAS - LVLD AP4: 8.1 CM
BH CV ECHO MEAS - LVLS AP2: 7.3 CM
BH CV ECHO MEAS - LVLS AP4: 7.3 CM
BH CV ECHO MEAS - LVPWD: 1.2 CM
BH CV ECHO MEAS - MED PEAK E' VEL: 10.2 CM/SEC
BH CV ECHO MEAS - MEDIAL E/E' RATIO: 11.7
BH CV ECHO MEAS - MV E MAX VEL: 120.7 CM/SEC
BH CV ECHO MEAS - PA ACC SLOPE: 762.4 CM/SEC^2
BH CV ECHO MEAS - PA ACC TIME: 0.1 SEC
BH CV ECHO MEAS - PA PR(ACCEL): 33.8 MMHG
BH CV ECHO MEAS - PI END-D VEL: 140.3 CM/SEC
BH CV ECHO MEAS - RAP SYSTOLE: 3 MMHG
BH CV ECHO MEAS - RVDD: 3.7 CM
BH CV ECHO MEAS - RVSP: 39 MMHG
BH CV ECHO MEAS - SI(CUBED): 34 ML/M^2
BH CV ECHO MEAS - SI(MOD-SP2): 34 ML/M^2
BH CV ECHO MEAS - SI(MOD-SP4): 34 ML/M^2
BH CV ECHO MEAS - SI(TEICH): 27.8 ML/M^2
BH CV ECHO MEAS - SV(CUBED): 76.2 ML
BH CV ECHO MEAS - SV(MOD-SP2): 76 ML
BH CV ECHO MEAS - SV(MOD-SP4): 76 ML
BH CV ECHO MEAS - SV(TEICH): 62.3 ML
BH CV ECHO MEAS - TAPSE (>1.6): 1.5 CM2
BH CV ECHO MEAS - TR MAX PG: 36 MMHG
BH CV ECHO MEAS - TR MAX VEL: 299.9 CM/SEC
BH CV ECHO MEASUREMENTS AVERAGE E/E' RATIO: 12.71
BH CV VAS BP RIGHT ARM: NORMAL MMHG
BH CV XLRA - RV BASE: 5.5 CM
BH CV XLRA - RV LENGTH: 8.8 CM
BH CV XLRA - RV MID: 3.9 CM
BH CV XLRA - TDI S': 13.7 CM/SEC
BUN BLD-MCNC: 17 MG/DL (ref 9–23)
BUN/CREAT SERPL: 16.8 (ref 7–25)
CALCIUM SPEC-SCNC: 8.6 MG/DL (ref 8.7–10.4)
CHLORIDE SERPL-SCNC: 109 MMOL/L (ref 99–109)
CO2 SERPL-SCNC: 27 MMOL/L (ref 20–31)
CREAT BLD-MCNC: 1.01 MG/DL (ref 0.6–1.3)
DEPRECATED RDW RBC AUTO: 79.7 FL (ref 37–54)
EOSINOPHIL # BLD AUTO: 0.06 10*3/MM3 (ref 0–0.3)
EOSINOPHIL NFR BLD AUTO: 1.5 % (ref 0–3)
ERYTHROCYTE [DISTWIDTH] IN BLOOD BY AUTOMATED COUNT: 22.4 % (ref 11.3–14.5)
GFR SERPL CREATININE-BSD FRML MDRD: 73 ML/MIN/1.73
GLUCOSE BLD-MCNC: 97 MG/DL (ref 70–100)
HCT VFR BLD AUTO: 30.6 % (ref 38.9–50.9)
HGB BLD-MCNC: 9.9 G/DL (ref 13.1–17.5)
IMM GRANULOCYTES # BLD: 0.01 10*3/MM3 (ref 0–0.03)
IMM GRANULOCYTES NFR BLD: 0.2 % (ref 0–0.6)
LEFT ATRIUM VOLUME INDEX: 80.9 ML/M^2
LEFT ATRIUM VOLUME: 181 ML
LYMPHOCYTES # BLD AUTO: 0.9 10*3/MM3 (ref 0.6–4.8)
LYMPHOCYTES NFR BLD AUTO: 22.2 % (ref 24–44)
MCH RBC QN AUTO: 32.5 PG (ref 27–31)
MCHC RBC AUTO-ENTMCNC: 32.4 G/DL (ref 32–36)
MCV RBC AUTO: 100.3 FL (ref 80–99)
MONOCYTES # BLD AUTO: 0.35 10*3/MM3 (ref 0–1)
MONOCYTES NFR BLD AUTO: 8.6 % (ref 0–12)
NEUTROPHILS # BLD AUTO: 2.74 10*3/MM3 (ref 1.5–8.3)
NEUTROPHILS NFR BLD AUTO: 67.7 % (ref 41–71)
PLATELET # BLD AUTO: 154 10*3/MM3 (ref 150–450)
PMV BLD AUTO: 9.3 FL (ref 6–12)
POTASSIUM BLD-SCNC: 3.4 MMOL/L (ref 3.5–5.5)
POTASSIUM BLD-SCNC: 4.2 MMOL/L (ref 3.5–5.5)
RBC # BLD AUTO: 3.05 10*6/MM3 (ref 4.2–5.76)
SODIUM BLD-SCNC: 143 MMOL/L (ref 132–146)
WBC NRBC COR # BLD: 4.05 10*3/MM3 (ref 3.5–10.8)

## 2018-12-15 PROCEDURE — 99233 SBSQ HOSP IP/OBS HIGH 50: CPT | Performed by: HOSPITALIST

## 2018-12-15 PROCEDURE — 25010000002 FUROSEMIDE PER 20 MG: Performed by: NURSE PRACTITIONER

## 2018-12-15 PROCEDURE — 85025 COMPLETE CBC W/AUTO DIFF WBC: CPT | Performed by: NURSE PRACTITIONER

## 2018-12-15 PROCEDURE — 93306 TTE W/DOPPLER COMPLETE: CPT

## 2018-12-15 PROCEDURE — 84132 ASSAY OF SERUM POTASSIUM: CPT | Performed by: HOSPITALIST

## 2018-12-15 PROCEDURE — 93325 DOPPLER ECHO COLOR FLOW MAPG: CPT | Performed by: INTERNAL MEDICINE

## 2018-12-15 PROCEDURE — 80048 BASIC METABOLIC PNL TOTAL CA: CPT | Performed by: NURSE PRACTITIONER

## 2018-12-15 PROCEDURE — 93321 DOPPLER ECHO F-UP/LMTD STD: CPT | Performed by: INTERNAL MEDICINE

## 2018-12-15 PROCEDURE — 93308 TTE F-UP OR LMTD: CPT | Performed by: INTERNAL MEDICINE

## 2018-12-15 RX ORDER — POTASSIUM CHLORIDE 750 MG/1
40 CAPSULE, EXTENDED RELEASE ORAL AS NEEDED
Status: DISCONTINUED | OUTPATIENT
Start: 2018-12-15 | End: 2018-12-16

## 2018-12-15 RX ORDER — POTASSIUM CHLORIDE 7.45 MG/ML
10 INJECTION INTRAVENOUS
Status: DISCONTINUED | OUTPATIENT
Start: 2018-12-15 | End: 2018-12-16

## 2018-12-15 RX ORDER — POTASSIUM CHLORIDE 1.5 G/1.77G
40 POWDER, FOR SOLUTION ORAL AS NEEDED
Status: DISCONTINUED | OUTPATIENT
Start: 2018-12-15 | End: 2018-12-16

## 2018-12-15 RX ADMIN — METOPROLOL TARTRATE 12.5 MG: 25 TABLET, FILM COATED ORAL at 20:08

## 2018-12-15 RX ADMIN — APIXABAN 5 MG: 5 TABLET, FILM COATED ORAL at 08:08

## 2018-12-15 RX ADMIN — FUROSEMIDE 40 MG: 10 INJECTION, SOLUTION INTRAMUSCULAR; INTRAVENOUS at 08:08

## 2018-12-15 RX ADMIN — METOPROLOL TARTRATE 12.5 MG: 25 TABLET, FILM COATED ORAL at 08:08

## 2018-12-15 RX ADMIN — POTASSIUM CHLORIDE 40 MEQ: 750 CAPSULE, EXTENDED RELEASE ORAL at 11:55

## 2018-12-15 RX ADMIN — SODIUM CHLORIDE, PRESERVATIVE FREE 3 ML: 5 INJECTION INTRAVENOUS at 08:09

## 2018-12-15 RX ADMIN — SODIUM CHLORIDE, PRESERVATIVE FREE 3 ML: 5 INJECTION INTRAVENOUS at 20:11

## 2018-12-15 RX ADMIN — APIXABAN 5 MG: 5 TABLET, FILM COATED ORAL at 20:08

## 2018-12-15 RX ADMIN — POTASSIUM CHLORIDE 40 MEQ: 750 CAPSULE, EXTENDED RELEASE ORAL at 08:12

## 2018-12-15 RX ADMIN — POTASSIUM CHLORIDE 10 MEQ: 750 CAPSULE, EXTENDED RELEASE ORAL at 08:08

## 2018-12-15 NOTE — PLAN OF CARE
Problem: Respiratory Distress Syndrome (,NICU)  Goal: Signs and Symptoms of Listed Potential Problems Will be Absent, Minimized or Managed (Respiratory Distress Syndrome)  Outcome: Ongoing (interventions implemented as appropriate)   12/15/18 5431   Goal/Outcome Evaluation   Problems Assessed (Respiratory Distress Syndrome) all   Problems Present (RDS) none

## 2018-12-15 NOTE — PROGRESS NOTES
"    [facility] Medicine Services  PROGRESS NOTE    Patient Name: Smith Craven  : 1950  MRN: [id number]    Date of Admission: 2018  Length of Stay: 1  Primary Care Physician: Jeane Baird MD    Subjective   Subjective     CC:  F/U SOA, BLE edema, abdominal distension    HPI:  Patient seen this morning. Family at bedside. He says his SOA is slightly better following Lasix. Abdomen remains distended and \"feels hard.\" His abdomen has been swollen for some time, but worse over the past few days. Family says he is not wearing his CPAP, just oxygen at night. He is supposed to wear oxygen during the day but does not do so.     Review of Systems  Gen-no fevers, no chills  CV-no chest pain, no palpitations  Resp-no cough, +dyspnea  GI-no N/V/D, no abd pain, +abdominal swelling    Otherwise ROS is negative except as mentioned in the HPI.    Objective   Objective     Vital Signs:   Temp:  [97.3 °F (36.3 °C)-98.5 °F (36.9 °C)] 97.3 °F (36.3 °C)  Heart Rate:  [64-81] 73  Resp:  [17-24] 17  BP: (107-151)/(55-78) 122/59        Physical Exam:  Gen-no acute distress  HENT-NCAT, mucous membranes moist  CV-RRR, S1 S2 normal, no m/r/g  Resp-mildly diminished at the bases, no wheezes or rales, nonlabored  Abd-distended but remains soft, NT, +BS  Ext-2+ pitting BLE edema  Neuro-A&Ox3, no focal deficits  Skin-no rashes  Psych-appropriate mood    Results Reviewed:  I have personally reviewed current lab, radiology, and data and agree.    Results from last 7 days   Lab Units  12/15/18   0523  18   1909  18   1225   WBC 10*3/mm3  4.05   --   8.26   HEMOGLOBIN g/dL  9.9*   --   11.3*   HEMATOCRIT %  30.6*   --   35.0*   PLATELETS 10*3/mm3  154   --   224   INR    --   1.14*   --      Results from last 7 days   Lab Units  12/15/18   0523  18   1225   SODIUM mmol/L  143  138   POTASSIUM mmol/L  3.4*  3.8   CHLORIDE mmol/L  109  107   CO2 mmol/L  27.0  22.0   BUN mg/dL  17  17 "   CREATININE mg/dL  1.01  1.11   GLUCOSE mg/dL  97  120*   CALCIUM mg/dL  8.6*  9.1   ALT (SGPT) U/L   --   16   AST (SGOT) U/L   --   31   TROPONIN I ng/mL   --   0.030     Estimated Creatinine Clearance: 83.6 mL/min (by C-G formula based on SCr of 1.01 mg/dL).  BNP   Date Value Ref Range Status   12/14/2018 421.0 (H) 0.0 - 100.0 pg/mL Final     Comment:     Results may be falsely decreased if patient taking Biotin.       Microbiology Results Abnormal     None          Imaging Results (last 24 hours)     Procedure Component Value Units Date/Time    US Abdomen Limited [[id number]] Updated:  12/14/18 2020    XR Chest 2 View [[id number]] Collected:  12/14/18 1308     Updated:  12/14/18 1332    Narrative:       EXAMINATION: XR CHEST 2 VW- 12/14/2018     INDICATION: dyspnea     TECHNIQUE: Two view chest.     COMPARISONS: 09/22/2017     FINDINGS: Small volume bilateral pleural effusions and adjacent opacity.  Prominent cardiopericardial silhouette. Atherosclerosis aortic knob. No  pneumothorax.       Impression:       Small volume bilateral pleural effusions.     D:  12/14/2018  E:  12/14/2018     This report was finalized on 12/14/2018 1:30 PM by Uriel Mcneal.           Results for orders placed in visit on 07/24/18   Adult Transthoracic Echo Complete W/ Cont if Necessary Per Protocol    Narrative · There is severe left atrial enlargement.  · Global and segmental LV wall motion is normal. The calculated EF is 64%.  · Right atrium and ventricle are normal in size.  · RV systolic function is normal. There is no evidence of volume or   pressure overload.  · There is no evidence of pulmonary artery hypertension. There is no   tricuspid regurgitation.  · There is mitral annular calcification and moderate - severe PA   hypertension.  · Aortic valve sclerosis is present.  · There is a trivial circumferential pericardial effusion.          I have reviewed the medications.      apixaban 5 mg Oral Q12H   budesonide-formoterol 2 puff  Inhalation BID - RT   furosemide 40 mg Intravenous Daily   metoprolol tartrate 12.5 mg Oral Q12H   potassium chloride 10 mEq Oral Daily   sodium chloride 3 mL Intravenous Q12H         Assessment/Plan   Assessment / Plan     Active Hospital Problems    Diagnosis   • **Acute on chronic diastolic heart failure (CMS/HCC)     LAUREN 6/20/2016 - EF 50%, moderate to severe MR       • Abdominal distention   • Pleural effusion, bilateral   • SOB (shortness of breath)   • Hypokalemia   • Fatty liver   • Mitral valve regurgitation, moderate to severe   • Chronic anemia   • Hyperbilirubinemia   • Splenomegaly, congestive, chronic   • Gilbert's syndrome   • COPD (chronic obstructive pulmonary disease) (CMS/HCC)     Mild to moderate obstruction on Oct 2016 PFTs     • ANN MARIE on CPAP     CPAP compliant     • Obesity (BMI 30-39.9)     BMI 35.5     • Atrial fibrillation (CMS/HCC)     a. CHADS2 = 0.  b. Event recorder, December 2007: Sinus bradycardia with heart rate in the 30 to 40 BPM range.    c. Holter monitor, 01/10/2013: Atrial fibrillation with ventricular response of  BPM and rare PACs.            Brief Hospital Course to date:  Smith Craven is a 68 y.o. male with hx of chronic hypoxic respiratory failure, COPD, ANN MARIE, DHF, mitral regurgitation, Afib on Eliquis, chronic splenomegaly, Gilbert's syndrome with chronic hyperbilirubinemia, and fatty liver (determined by biopsy summer 2018) who presents due to 2-3 day history of worsening dyspnea, lower extremity edema, and abdominal distension. ER evaluation showed elevated , negative troponins, and CXR with small bilateral pleural effusions. Admitted to hospitalists.     Assessment & Plan:  --Continue IV Lasix. Echo pending. Has hx of severe mitral regurgitation. Consider Cardiology consult (sees Dr. Napier) if anything looks different on Echo.  --Abdominal ultrasound pending. If this shows ascites, will likely need paracentesis. He has chronic splenomegaly and follows with  a hematologist Dr. Ramirez in [place] (attempted to get records but office is closed). He also had a liver biopsy confirming fatty liver only, no cirrhosis.    --Replace K.    DVT Prophylaxis: Eliquis    CODE STATUS:   Code Status and Medical Interventions:   Ordered at: 12/14/18 1632     Level Of Support Discussed With:    Patient     Code Status:    CPR     Medical Interventions (Level of Support Prior to Arrest):    Full       Disposition: I expect the patient to be discharged home in ~2-3 days      Electronically signed by Hattie Ojeda MD, 12/15/18, 12:14 PM.

## 2018-12-16 ENCOUNTER — APPOINTMENT (OUTPATIENT)
Dept: CT IMAGING | Facility: HOSPITAL | Age: 68
End: 2018-12-16

## 2018-12-16 LAB
ANION GAP SERPL CALCULATED.3IONS-SCNC: 6 MMOL/L (ref 3–11)
BUN BLD-MCNC: 19 MG/DL (ref 9–23)
BUN/CREAT SERPL: 17.9 (ref 7–25)
CALCIUM SPEC-SCNC: 8.4 MG/DL (ref 8.7–10.4)
CHLORIDE SERPL-SCNC: 109 MMOL/L (ref 99–109)
CO2 SERPL-SCNC: 26 MMOL/L (ref 20–31)
CREAT BLD-MCNC: 1.06 MG/DL (ref 0.6–1.3)
DEPRECATED RDW RBC AUTO: 79 FL (ref 37–54)
ERYTHROCYTE [DISTWIDTH] IN BLOOD BY AUTOMATED COUNT: 22 % (ref 11.3–14.5)
GFR SERPL CREATININE-BSD FRML MDRD: 69 ML/MIN/1.73
GLUCOSE BLD-MCNC: 87 MG/DL (ref 70–100)
HCT VFR BLD AUTO: 30.6 % (ref 38.9–50.9)
HGB BLD-MCNC: 9.8 G/DL (ref 13.1–17.5)
MCH RBC QN AUTO: 31.9 PG (ref 27–31)
MCHC RBC AUTO-ENTMCNC: 32 G/DL (ref 32–36)
MCV RBC AUTO: 99.7 FL (ref 80–99)
PLATELET # BLD AUTO: 166 10*3/MM3 (ref 150–450)
PMV BLD AUTO: 10 FL (ref 6–12)
POTASSIUM BLD-SCNC: 4 MMOL/L (ref 3.5–5.5)
RBC # BLD AUTO: 3.07 10*6/MM3 (ref 4.2–5.76)
SODIUM BLD-SCNC: 141 MMOL/L (ref 132–146)
WBC NRBC COR # BLD: 3.78 10*3/MM3 (ref 3.5–10.8)

## 2018-12-16 PROCEDURE — 71250 CT THORAX DX C-: CPT

## 2018-12-16 PROCEDURE — 25010000002 FUROSEMIDE PER 20 MG: Performed by: INTERNAL MEDICINE

## 2018-12-16 PROCEDURE — 25010000002 FUROSEMIDE PER 20 MG: Performed by: NURSE PRACTITIONER

## 2018-12-16 PROCEDURE — 25010000002 IOPAMIDOL 61 % SOLUTION: Performed by: INTERNAL MEDICINE

## 2018-12-16 PROCEDURE — 74178 CT ABD&PLV WO CNTR FLWD CNTR: CPT

## 2018-12-16 PROCEDURE — 85027 COMPLETE CBC AUTOMATED: CPT | Performed by: HOSPITALIST

## 2018-12-16 PROCEDURE — 80048 BASIC METABOLIC PNL TOTAL CA: CPT | Performed by: HOSPITALIST

## 2018-12-16 PROCEDURE — 99233 SBSQ HOSP IP/OBS HIGH 50: CPT | Performed by: INTERNAL MEDICINE

## 2018-12-16 RX ORDER — FUROSEMIDE 10 MG/ML
40 INJECTION INTRAMUSCULAR; INTRAVENOUS
Status: DISCONTINUED | OUTPATIENT
Start: 2018-12-16 | End: 2018-12-17

## 2018-12-16 RX ADMIN — SODIUM CHLORIDE, PRESERVATIVE FREE 3 ML: 5 INJECTION INTRAVENOUS at 08:11

## 2018-12-16 RX ADMIN — METOPROLOL TARTRATE 12.5 MG: 25 TABLET, FILM COATED ORAL at 08:11

## 2018-12-16 RX ADMIN — FUROSEMIDE 40 MG: 10 INJECTION, SOLUTION INTRAMUSCULAR; INTRAVENOUS at 08:11

## 2018-12-16 RX ADMIN — APIXABAN 5 MG: 5 TABLET, FILM COATED ORAL at 08:11

## 2018-12-16 RX ADMIN — POTASSIUM CHLORIDE 10 MEQ: 750 CAPSULE, EXTENDED RELEASE ORAL at 08:11

## 2018-12-16 RX ADMIN — FUROSEMIDE 40 MG: 10 INJECTION, SOLUTION INTRAMUSCULAR; INTRAVENOUS at 17:21

## 2018-12-16 RX ADMIN — IOPAMIDOL 95 ML: 612 INJECTION, SOLUTION INTRAVENOUS at 16:50

## 2018-12-16 NOTE — PROGRESS NOTES
[facility] Medicine Services  PROGRESS NOTE    Patient Name: Smith Craven  : 1950  MRN: [id number]    Date of Admission: 2018  Length of Stay: 2  Primary Care Physician: Jeane Baird MD    Subjective   Subjective     CC:  F/U SOA, BLE edema, abdominal distension    HPI:  Patient is still short of breath with any exertion. Cant lie on his left side because of fullness.  He denies chest pain  Review of Systems  Gen-no fevers, no chills  CV-no chest pain, no palpitations  Resp-no cough, +dyspnea  GI-no N/V/D, no abd pain, +abdominal swelling    Otherwise ROS is negative except as mentioned in the HPI.    Objective   Objective     Vital Signs:   Temp:  [97.5 °F (36.4 °C)-98 °F (36.7 °C)] 97.7 °F (36.5 °C)  Heart Rate:  [61-73] 73  Resp:  [15-16] 16  BP: (113-136)/(60-79) 118/79        Physical Exam:  Gen-no acute distress-- though breathes shallow  HENT-NCAT, mucous membranes moist  CV-RRR, S1 S2 normal, no m/r/g  Resp-mildly diminished at the bases, no wheezes or rales, nonlabored  Abd-distended but remains soft, NT, +BS- very protuberant  Ext-2+ pitting BLE edema  Neuro-A&Ox3, no focal deficits  Skin-no rashes  Psych-appropriate mood    Results Reviewed:  I have personally reviewed current lab, radiology, and data and agree.    Results from last 7 days   Lab Units  18   0517  12/15/18   0523  18   1909  18   1225   WBC 10*3/mm3  3.78  4.05   --   8.26   HEMOGLOBIN g/dL  9.8*  9.9*   --   11.3*   HEMATOCRIT %  30.6*  30.6*   --   35.0*   PLATELETS 10*3/mm3  166  154   --   224   INR    --    --   1.14*   --      Results from last 7 days   Lab Units  18   0517  12/15/18   1544  12/15/18   0523  18   1225   SODIUM mmol/L  141   --   143  138   POTASSIUM mmol/L  4.0  4.2  3.4*  3.8   CHLORIDE mmol/L  109   --   109  107   CO2 mmol/L  26.0   --   27.0  22.0   BUN mg/dL  19   --   17  17   CREATININE mg/dL  1.06   --   1.01  1.11   GLUCOSE mg/dL   87   --   97  120*   CALCIUM mg/dL  8.4*   --   8.6*  9.1   ALT (SGPT) U/L   --    --    --   16   AST (SGOT) U/L   --    --    --   31   TROPONIN I ng/mL   --    --    --   0.030     Estimated Creatinine Clearance: 82.5 mL/min (by C-G formula based on SCr of 1.06 mg/dL).  BNP   Date Value Ref Range Status   12/14/2018 421.0 (H) 0.0 - 100.0 pg/mL Final     Comment:     Results may be falsely decreased if patient taking Biotin.       Microbiology Results Abnormal     None          Imaging Results (last 24 hours)     Procedure Component Value Units Date/Time    US Abdomen Limited [[id number]] Collected:  12/15/18 1438     Updated:  12/15/18 5499    Narrative:       EXAMINATION: US ABDOMEN LIMITED - 12/15/2018     INDICATION: J44.1-Chronic obstructive pulmonary disease with (acute)  exacerbation; J96.21-Acute and chronic respiratory failure with hypoxia;  E80.4-Gilbert syndrome.     TECHNIQUE: Ultrasound of all 4 quadrants of the abdomen and pelvis for  ascites.     COMPARISON: None.     FINDINGS: Trace free fluid is noted in both pleural spaces. No ascites  is seen in the abdomen or pelvis. No abnormally dilated bowel loops are  seen.       Impression:       Small pleural effusions noted. No evidence of ascites.      DICTATED:   12/15/2018  EDITED/ls :   12/15/2018      This report was finalized on 12/15/2018 11:02 PM by DR. Chandra Horn MD.           Results for orders placed during the hospital encounter of 12/14/18   Adult Transthoracic Echo Complete W/ Cont if Necessary Per Protocol    Narrative · Bi-atrial and right ventricular enlargement is present.  · The LV ejection fraction is normal; mild concentric LVH is seen.  · There is aortic valve sclerosis with trace aortic insufficiency.  · There is mitral annular calcification with mild mitral regurgitation.  · The estimated PA pressure is mildly elevated.  · There is a trivial pericardial effusion.          I have reviewed the medications.      budesonide-formoterol  2 puff Inhalation BID - RT   furosemide 40 mg Intravenous BID   metoprolol tartrate 12.5 mg Oral Q12H   potassium chloride 10 mEq Oral Daily   sodium chloride 3 mL Intravenous Q12H         Assessment/Plan   Assessment / Plan     Active Hospital Problems    Diagnosis   • **Acute on chronic diastolic heart failure (CMS/HCC)     LAUREN 6/20/2016 - EF 50%, moderate to severe MR       • Hypokalemia   • Fatty liver   • Mitral valve regurgitation, moderate to severe   • Chronic anemia   • Hyperbilirubinemia   • Abdominal distention   • Splenomegaly, congestive, chronic   • Pleural effusion, bilateral   • Gilbert's syndrome   • COPD (chronic obstructive pulmonary disease) (CMS/HCC)     Mild to moderate obstruction on Oct 2016 PFTs     • ANN MARIE on CPAP     CPAP compliant     • Obesity (BMI 30-39.9)     BMI 35.5     • SOB (shortness of breath)   • Atrial fibrillation (CMS/HCC)     a. CHADS2 = 0.  b. Event recorder, December 2007: Sinus bradycardia with heart rate in the 30 to 40 BPM range.    c. Holter monitor, 01/10/2013: Atrial fibrillation with ventricular response of  BPM and rare PACs.            Brief Hospital Course to date:  Smith Craven is a 68 y.o. male with hx of chronic hypoxic respiratory failure, COPD, ANN MARIE, DHF, mitral regurgitation, Afib on Eliquis, chronic splenomegaly, Gilbert's syndrome with chronic hyperbilirubinemia, and fatty liver (determined by biopsy summer 2018) who presents due to 2-3 day history of worsening dyspnea, lower extremity edema, and abdominal distension. ER evaluation showed elevated , negative troponins, and CXR with small bilateral pleural effusions. Admitted to hospitalists.     Assessment & Plan:  --Continue IV Lasix. Echo is improved with normal EF and mild MR  (Has hx of severe mitral regurgitation. Consider Cardiology consult (sees Dr. Napier)     --Abdominal ultrasound shows no ascites. He has chronic splenomegaly and follows with a hematologist Dr. Ramirez in [place]  (attempted to get records but office is closed). He also had a liver biopsy confirming fatty liver only, no cirrhosis.    -- still something doesn't fit-- will repeat CT of chest A and P - assess ofr possible lymphoma or mass in his spleen. Multitude of subchronic issues that have no unifying diagnosis.      DVT Prophylaxis: Eliquis-- held today    CODE STATUS:   Code Status and Medical Interventions:   Ordered at: 12/14/18 1632     Level Of Support Discussed With:    Patient     Code Status:    CPR     Medical Interventions (Level of Support Prior to Arrest):    Full       Disposition: I expect the patient to be discharged home in ~2-3 days      Electronically signed by Marni Schmidt MD, 12/16/18, 1:15 PM.

## 2018-12-17 PROBLEM — E87.6 HYPOKALEMIA: Status: RESOLVED | Noted: 2018-12-15 | Resolved: 2018-12-17

## 2018-12-17 PROBLEM — I31.39 PERICARDIAL EFFUSION: Status: ACTIVE | Noted: 2018-12-17

## 2018-12-17 LAB
ALBUMIN SERPL-MCNC: 3.97 G/DL (ref 3.2–4.8)
ALP SERPL-CCNC: 42 U/L (ref 25–100)
ALT SERPL W P-5'-P-CCNC: 13 U/L (ref 7–40)
AST SERPL-CCNC: 26 U/L (ref 0–33)
BILIRUB CONJ SERPL-MCNC: 2 MG/DL (ref 0–0.2)
BILIRUB INDIRECT SERPL-MCNC: 6.6 MG/DL (ref 0.1–1.1)
BILIRUB SERPL-MCNC: 8.6 MG/DL (ref 0.3–1.2)
CYTOLOGIST CVX/VAG CYTO: NORMAL
FOLATE SERPL-MCNC: 16.97 NG/ML (ref 3.2–20)
PATH INTERP BLD-IMP: NORMAL
PROT SERPL-MCNC: 6.1 G/DL (ref 5.7–8.2)
VIT B12 BLD-MCNC: 713 PG/ML (ref 211–911)

## 2018-12-17 PROCEDURE — 88342 IMHCHEM/IMCYTCHM 1ST ANTB: CPT | Performed by: INTERNAL MEDICINE

## 2018-12-17 PROCEDURE — 99233 SBSQ HOSP IP/OBS HIGH 50: CPT | Performed by: INTERNAL MEDICINE

## 2018-12-17 PROCEDURE — 88313 SPECIAL STAINS GROUP 2: CPT | Performed by: INTERNAL MEDICINE

## 2018-12-17 PROCEDURE — 88189 FLOWCYTOMETRY/READ 16 & >: CPT

## 2018-12-17 PROCEDURE — 88185 FLOWCYTOMETRY/TC ADD-ON: CPT

## 2018-12-17 PROCEDURE — 88311 DECALCIFY TISSUE: CPT | Performed by: INTERNAL MEDICINE

## 2018-12-17 PROCEDURE — 88305 TISSUE EXAM BY PATHOLOGIST: CPT | Performed by: INTERNAL MEDICINE

## 2018-12-17 PROCEDURE — 25010000002 FUROSEMIDE PER 20 MG: Performed by: INTERNAL MEDICINE

## 2018-12-17 PROCEDURE — 99221 1ST HOSP IP/OBS SF/LOW 40: CPT | Performed by: INTERNAL MEDICINE

## 2018-12-17 PROCEDURE — 80076 HEPATIC FUNCTION PANEL: CPT | Performed by: INTERNAL MEDICINE

## 2018-12-17 PROCEDURE — 82746 ASSAY OF FOLIC ACID SERUM: CPT | Performed by: INTERNAL MEDICINE

## 2018-12-17 PROCEDURE — 07DR3ZX EXTRACTION OF ILIAC BONE MARROW, PERCUTANEOUS APPROACH, DIAGNOSTIC: ICD-10-PCS | Performed by: PATHOLOGY

## 2018-12-17 PROCEDURE — 85060 BLOOD SMEAR INTERPRETATION: CPT | Performed by: INTERNAL MEDICINE

## 2018-12-17 PROCEDURE — 82607 VITAMIN B-12: CPT | Performed by: INTERNAL MEDICINE

## 2018-12-17 PROCEDURE — 88341 IMHCHEM/IMCYTCHM EA ADD ANTB: CPT | Performed by: INTERNAL MEDICINE

## 2018-12-17 PROCEDURE — 88184 FLOWCYTOMETRY/ TC 1 MARKER: CPT

## 2018-12-17 PROCEDURE — 85097 BONE MARROW INTERPRETATION: CPT | Performed by: INTERNAL MEDICINE

## 2018-12-17 RX ADMIN — POTASSIUM CHLORIDE 10 MEQ: 750 CAPSULE, EXTENDED RELEASE ORAL at 08:50

## 2018-12-17 RX ADMIN — FUROSEMIDE 40 MG: 10 INJECTION, SOLUTION INTRAMUSCULAR; INTRAVENOUS at 08:50

## 2018-12-17 RX ADMIN — SODIUM CHLORIDE, PRESERVATIVE FREE 3 ML: 5 INJECTION INTRAVENOUS at 19:44

## 2018-12-17 RX ADMIN — METOPROLOL TARTRATE 12.5 MG: 25 TABLET, FILM COATED ORAL at 08:50

## 2018-12-17 RX ADMIN — METOPROLOL TARTRATE 12.5 MG: 25 TABLET, FILM COATED ORAL at 19:44

## 2018-12-17 NOTE — PAYOR COMM NOTE
"Juliette Guajardo RN  Utilization Review  P: [phone]  F: [phone]    Reference # [id number]  Initial Clinicals    Smith Craven (68 y.o. Male)     Date of Birth Social Security Number Address Home Phone MRN    1950  [address] [phone] [id number]    Yazdanism Marital Status          None        Admission Date Admission Type Admitting Provider Attending Provider Department, Room/Bed    18 Emergency Marni Schmidt MD Woody, Kathryn E, MD [facility] 3E, S335/1    Discharge Date Discharge Disposition Discharge Destination                       Attending Provider:  Marni Schmidt MD    Allergies:  No Known Allergies    Isolation:  None   Infection:  None   Code Status:  CPR    Ht:  180.3 cm (70.98\")   Wt:  106 kg (233 lb 4.8 oz)    Admission Cmt:  None   Principal Problem:  Acute on chronic diastolic heart failure (CMS/HCC) [I50.33] More...                 Active Insurance as of 2018     Primary Coverage     Payor Plan Insurance Group Employer/Plan Group    HUMANA MEDICARE REPLACEMENT HUMANA MEDICARE REPL [id number]     Payor Plan Address Payor Plan Phone Number Payor Plan Fax Number Effective Dates    [address] [address] [phone]  2018 - None Entered    [facility] [id number]       Subscriber Name Subscriber Birth Date Member ID       SMITH CRAVEN 1950 [id number]                 Emergency Contacts      (Rel.) Home Phone Work Phone Mobile Phone    Joanne Craven (Spouse) [phone]               History & Physical      Yuriy Craven DO at 2018  4:16 PM              [facility] Medicine Services  HISTORY AND PHYSICAL    Patient Name: Smith Craven  : 1950  MRN: [id number]  Primary Care Physician: Jeane Baird MD  Date of admission: 2018      Subjective   Subjective     Chief Complaint:  SOB    HPI:  Smith Craven is a 68 y.o. male who has " been experiencing SOB and lower extremity edema for 2 weeks, worse in the last few days.  Has been taking nebs and took steroids this am without relief.  No f/c/s.  States he feels he has lot of abdominal swelling but denies ever having had a paracentesis.      Review of Systems   Constitutional: Positive for activity change and fatigue. Negative for fever.   HENT: Negative for hearing loss.    Eyes: Negative for visual disturbance.   Respiratory: Positive for cough and shortness of breath.    Cardiovascular: Positive for leg swelling. Negative for chest pain.   Gastrointestinal: Positive for abdominal distention and diarrhea. Negative for abdominal pain, constipation, nausea and vomiting.   Genitourinary: Negative for dysuria.        Urine dark   Musculoskeletal: Negative for back pain.   Skin: Negative for rash.   Neurological: Negative for dizziness and weakness.        Otherwise 10-system ROS reviewed and is negative except as mentioned in the HPI.    Personal History     Past Medical History:   Diagnosis Date   • A-fib (CMS/[facility])    • Anemia    • Arthritis    • Belching    • CHF (congestive heart failure) (CMS/[facility])    • Cholelithiasis    • COPD (chronic obstructive pulmonary disease) (CMS/[facility]) 10/26/2016   • Coronary artery disease    • Cough     PRODUCTIVE   • Elevated bilirubin    • Elevated LFTs    • Gastritis     H/ pylori gastritis   • H/O echocardiogram 12/03/2015    Study quality good. LV chamber size is midly dilated. Est EF 50-55%, Left atrium mod dilated. A patent marin ovale is not demonstrated with color doppler and agitated saline contrast, aortic valve leaflets mildly thickened, trace of aorta reg., mod mitral reg., mild tricuspid reg., RVSP 40mmHg, Trivial pericardial effusion visualized   • History of bleeding ulcers     2003   • History of transfusion    • Pneumonia     [id number]   • Reactive airway disease    • Shortness of breath        Past Surgical History:   Procedure Laterality Date   •  CARDIAC CATHETERIZATION  06/17/2016    PER DR. GRAY   • CATARACT EXTRACTION Right 2016   • CHOLECYSTECTOMY  08/2016   • COLONOSCOPY     • FRACTURE SURGERY     • JOINT REPLACEMENT     • LIVER BIOPSY  08/2018   • OTHER SURGICAL HISTORY      Cellulitis of left lug summer 2015   • REPLACEMENT TOTAL KNEE      left   • SKIN BIOPSY     • SKIN CANCER EXCISION  2015    REMOVED FROM FACE   • VEIN LIGATION AND STRIPPING WITH LASER         Family History: family history includes Arthritis in his brother, other, and sister; Atrial fibrillation in his mother; COPD in his sister; Coronary artery disease in his father; Dementia in his mother; Diverticulitis in his brother; Hypertension in his brother, mother, and other. Otherwise pertinent FHx was reviewed and unremarkable.     Social History:  reports that  has never smoked. he has never used smokeless tobacco. He reports that he does not drink alcohol or use drugs.  Social History     Social History Narrative    Lives in [place]     Still works daily       Medications:  Available home medication information reviewed.    No Known Allergies    Objective   Objective     Vital Signs:   Temp:  [98.5 °F (36.9 °C)] 98.5 °F (36.9 °C)  Heart Rate:  [78-81] 78  Resp:  [24] 24  BP: (117-151)/(65-78) 123/67        Physical Exam   Constitutional: No acute distress, awake, alert  Eyes: PERRLA, sclerae icteric, no conjunctival injection  HENT: NCAT, mucous membranes moist  Neck: Supple, no thyromegaly, no lymphadenopathy, trachea midline  Respiratory: Clear to auscultation bilaterally, nonlabored respirations.  No wheezing/rhonchi noted  Cardiovascular: irregular, no murmurs, rubs, or gallops, palpable pedal pulses bilaterally  Gastrointestinal: Positive bowel sounds, soft, nontender, distended, obese  Musculoskeletal: 2+ bilateral ankle edema, no clubbing or cyanosis to extremities  Psychiatric: Appropriate affect, cooperative  Neurologic: Oriented x 3, strength symmetric in all extremities,  speech clear  Skin: No rashes    Results Reviewed:  I have personally reviewed current lab, radiology, and data and agree.    Results from last 7 days   Lab Units  12/14/18   1225   WBC 10*3/mm3  8.26   HEMOGLOBIN g/dL  11.3*   HEMATOCRIT %  35.0*   PLATELETS 10*3/mm3  224     Results from last 7 days   Lab Units  12/14/18   1225   SODIUM mmol/L  138   POTASSIUM mmol/L  3.8   CHLORIDE mmol/L  107   CO2 mmol/L  22.0   BUN mg/dL  17   CREATININE mg/dL  1.11   GLUCOSE mg/dL  120*   CALCIUM mg/dL  9.1   ALT (SGPT) U/L  16   AST (SGOT) U/L  31   TROPONIN I ng/mL  0.030     Estimated Creatinine Clearance: 76 mL/min (by C-G formula based on SCr of 1.11 mg/dL).  Brief Urine Lab Results     None        BNP   Date Value Ref Range Status   12/14/2018 421.0 (H) 0.0 - 100.0 pg/mL Final     Comment:     Results may be falsely decreased if patient taking Biotin.     Imaging Results (last 24 hours)     Procedure Component Value Units Date/Time    XR Chest 2 View [[id number]] Collected:  12/14/18 1308     Updated:  12/14/18 1332    Narrative:       EXAMINATION: XR CHEST 2 VW- 12/14/2018     INDICATION: dyspnea     TECHNIQUE: Two view chest.     COMPARISONS: 09/22/2017     FINDINGS: Small volume bilateral pleural effusions and adjacent opacity.  Prominent cardiopericardial silhouette. Atherosclerosis aortic knob. No  pneumothorax.       Impression:       Small volume bilateral pleural effusions.     D:  12/14/2018  E:  12/14/2018     This report was finalized on 12/14/2018 1:30 PM by Uriel Mcneal.           Results for orders placed in visit on 07/24/18   Adult Transthoracic Echo Complete W/ Cont if Necessary Per Protocol    Narrative · There is severe left atrial enlargement.  · Global and segmental LV wall motion is normal. The calculated EF is 64%.  · Right atrium and ventricle are normal in size.  · RV systolic function is normal. There is no evidence of volume or   pressure overload.  · There is no evidence of pulmonary artery  hypertension. There is no   tricuspid regurgitation.  · There is mitral annular calcification and moderate - severe PA   hypertension.  · Aortic valve sclerosis is present.  · There is a trivial circumferential pericardial effusion.          Assessment/Plan   Assessment / Plan     Active Hospital Problems    Diagnosis   • **SOB (shortness of breath)   • Abdominal distention   • Pleural effusion, bilateral   • Hyperbilirubinemia   • Splenomegaly, congestive, chronic   • Gilbert's syndrome   • Heart failure (CMS/HCC)     LAUREN 6/20/2016 - EF 50%, moderate to severe MR       • COPD (chronic obstructive pulmonary disease) (CMS/HCC)     Mild to moderate obstruction on Oct 2016 PFTs     • ANN MARIE on CPAP     CPAP compliant     • Obesity (BMI 30-39.9)     BMI 35.5     • Atrial fibrillation (CMS/HCC)     a. CHADS2 = 0.  b. Event recorder, December 2007: Sinus bradycardia with heart rate in the 30 to 40 BPM range.    c. Holter monitor, 01/10/2013: Atrial fibrillation with ventricular response of  BPM and rare PACs.           Assessment & Plan:  --abdominal US  --diurese  --ECHO  --labs in am  --continue eliquis for a-fib  --obtain records from his hematologist re: his chronic liver/spleen issues.  Dr. Ramirez in [place]      DVT prophylaxis:  Eliquis    CODE STATUS:    Code Status and Medical Interventions:   Ordered at: 12/14/18 1632     Level Of Support Discussed With:    Patient     Code Status:    CPR     Medical Interventions (Level of Support Prior to Arrest):    Full           Electronically signed by SYDNEE Park, 12/14/18, 4:59 PM.          I seen and evaluated the patient.  I've performed a independent history and physical examination.  I agree with phimosis and plan.    Yuriy Craven DO  7:33 PM  12/14/18    Electronically signed by Yuriy Craven DO at 12/14/2018  7:34 PM       ICU Vital Signs     Row Name 12/17/18 1143 12/17/18 0808 12/17/18 0800 12/17/18 0600 12/17/18 0418       Height and Weight     Weight  --  --  --  --  106 kg (233 lb 4.8 oz)    Weight Method  --  --  --  --  Bed scale       Vitals    Temp  97.8 °F (36.6 °C)  98.8 °F (37.1 °C)  --  --  97.5 °F (36.4 °C)    Temp src  Oral  Oral  --  --  Oral    Pulse  66  --  --  --  80    Heart Rate Source  Monitor  Monitor  --  --  Monitor    Resp  18  18  --  --  18    Resp Rate Source  Visual  Visual  --  --  Visual    BP  112/72  --  --  --  110/80    Noninvasive MAP (mmHg)  --  --  --  --  90    BP Location  Right arm  Right arm  --  --  Right arm    BP Method  Automatic  Automatic  --  --  Automatic    Patient Position  Sitting  Sitting  --  --  Lying       Oxygen Therapy    SpO2  91 %  --  --  --  95 %    Device (Oxygen Therapy)  --  --  nasal cannula  nasal cannula  --    Flow (L/min)  --  --  1  2  --    Row Name 12/17/18 0400 12/16/18 2200 12/16/18 2030 12/16/18 1911 12/16/18 1500       Vitals    Temp  --  --  --  97.5 °F (36.4 °C)  98 °F (36.7 °C)    Temp src  --  --  --  Axillary  Oral    Pulse  --  --  --  60  60    Heart Rate Source  --  --  --  Monitor  Monitor    Resp  --  --  --  16  16    Resp Rate Source  --  --  --  Visual  Visual    BP  --  --  --  116/59  111/70    Noninvasive MAP (mmHg)  --  --  --  88  94    BP Location  --  --  --  Right arm  Right arm    BP Method  --  --  --  Automatic  Automatic    Patient Position  --  --  --  Sitting  Sitting       Oxygen Therapy    SpO2  --  --  --  98 %  --    Device (Oxygen Therapy)  nasal cannula  nasal cannula  nasal cannula  --  --    Flow (L/min)  2  2  2  --  --    Row Name 12/16/18 1202 12/16/18 0815 12/16/18 0744 12/16/18 0602 12/16/18 0337       Height and Weight    Weight  --  --  --  112 kg (247 lb 9.6 oz)  --    Weight Method  --  --  --  Standing scale  --       Vitals    Temp  97.7 °F (36.5 °C)  --  97.9 °F (36.6 °C)  --  97.8 °F (36.6 °C)    Temp src  Oral  --  Oral  --  Oral    Pulse  73  --  66  --  61    Heart Rate Source  Monitor  --  Monitor  --  Monitor    Resp  16  --  16  " --  16    Resp Rate Source  Visual  --  Visual  --  Visual    BP  118/79  --  136/73  --  113/71    Noninvasive MAP (mmHg)  88  --  107  --  84    BP Location  Right arm  --  Right arm  --  Right arm    BP Method  Automatic  --  Automatic  --  Automatic    Patient Position  Sitting  --  Lying  --  Lying       Oxygen Therapy    Device (Oxygen Therapy)  --  nasal cannula  --  --  --    Flow (L/min)  --  2  --  --  --    Row Name 12/16/18 0200 12/16/18 0000 12/15/18 2009 12/15/18 1947 12/15/18 1555       Vitals    Temp  --  --  --  98 °F (36.7 °C)  97.5 °F (36.4 °C)    Temp src  --  --  --  Oral  Oral    Pulse  --  --  --  62  --    Heart Rate Source  --  --  --  Monitor  Monitor    Resp  --  --  --  16  15    Resp Rate Source  --  --  --  Visual  Visual    BP  --  --  --  119/64  124/60    Noninvasive MAP (mmHg)  --  --  --  85  85    BP Location  --  --  --  Right arm  Right arm    BP Method  --  --  --  Automatic  Automatic    Patient Position  --  --  --  Sitting  Lying       Oxygen Therapy    Device (Oxygen Therapy)  nasal cannula  nasal cannula  nasal cannula  --  --    Flow (L/min)  2  2  2  --  --    Row Name 12/15/18 1132 12/15/18 1034 12/15/18 0815 12/15/18 0757 12/15/18 0600       Height and Weight    Height  --  180.3 cm (70.98\")  --  --  --    Weight  --  104 kg (230 lb)  --  --  --    Ideal Body Weight (IBW) (kg)  --  79.23  --  --  --    BSA (Calculated - sq m)  --  2.24 sq meters  --  --  --    BMI (Calculated)  --  32.1  --  --  --    Weight in (lb) to have BMI = 25  --  178.8  --  --  --       Vitals    Temp  97.3 °F (36.3 °C)  --  --  97.5 °F (36.4 °C)  --    Temp src  Oral  --  --  Oral  --    Heart Rate Source  Monitor  --  --  Monitor  --    Resp  17  --  --  17  --    Resp Rate Source  Visual  --  --  Visual  --    BP  122/59  --  --  115/58  --    Noninvasive MAP (mmHg)  97  --  --  79  --    BP Location  Right arm  --  --  Right arm  --    BP Method  Automatic  --  --  Automatic  --    Patient " Position  Lying  --  --  Lying  --       Oxygen Therapy    Device (Oxygen Therapy)  --  --  nasal cannula  --  nasal cannula    Flow (L/min)  --  --  2  --  2    Row Name 12/15/18 0505 12/15/18 0400 12/15/18 0200 12/15/18 0050 12/15/18 0000       Vitals    Temp  97.9 °F (36.6 °C)  --  --  97.9 °F (36.6 °C)  --    Temp src  Oral  --  --  Oral  --    Pulse  73  --  --  72  --    Heart Rate Source  Monitor  --  --  Monitor  --    Resp  18  --  --  20  --    Resp Rate Source  Visual  --  --  Visual  --    BP  107/69  --  --  109/72  --    Noninvasive MAP (mmHg)  96  --  --  85  --    BP Location  Right arm  --  --  Right arm  --    BP Method  Automatic  --  --  Automatic  --    Patient Position  Lying  --  --  Lying  --       Oxygen Therapy    SpO2  92 %  --  --  92 %  --    Device (Oxygen Therapy)  --  nasal cannula  nasal cannula  --  nasal cannula    Flow (L/min)  --  2  2  --  2    Row Name 12/14/18 2200 12/14/18 2026 12/14/18 2000 12/14/18 1840 12/14/18 1817       Vitals    Temp  --  97.7 °F (36.5 °C)  --  98.1 °F (36.7 °C)  --    Temp src  --  Oral  --  Oral  --    Pulse  --  64  --  71  80    Heart Rate Source  --  Monitor  --  Monitor  --    Resp  --  20  --  20  22    Resp Rate Source  --  Visual  --  --  --    BP  --  113/63  --  136/76  130/70    Noninvasive MAP (mmHg)  --  76  --  97  --    BP Location  --  Right arm  --  Right arm  --    BP Method  --  Automatic  --  Automatic  --    Patient Position  --  Sitting  --  Sitting  --       Oxygen Therapy    SpO2  --  94 %  --  92 %  94 %    Pulse Oximetry Type  --  --  --  Continuous  --    Device (Oxygen Therapy)  nasal cannula  --  nasal cannula  nasal cannula  nasal cannula    Flow (L/min)  2  --  2  2  2    Row Name 12/14/18 1731 12/14/18 1730 12/14/18 1707 12/14/18 1700 12/14/18 1630       Vitals    BP  --  131/73  --  133/55  122/72    Noninvasive MAP (mmHg)  --  98  --  117  91       Oxygen Therapy    SpO2  91 %  93 %  94 %  86 %  (Abnormal)   94 %     Row Name 12/14/18 1600 12/14/18 1530 12/14/18 1430 12/14/18 1337          Vitals    Pulse  --  --  --  78     Heart Rate Source  --  --  --  Monitor     BP  123/67  124/65  117/67  --     Noninvasive MAP (mmHg)  87  77  89  --        Oxygen Therapy    SpO2  93 %  94 %  91 %  100 %         Hospital Medications (all)       Dose Frequency Start End    budesonide-formoterol (SYMBICORT) 80-4.5 MCG/ACT inhaler 2 puff 2 puff 2 Times Daily - RT 12/14/2018     Sig - Route: Inhale 2 puffs 2 (Two) Times a Day. - Inhalation    iopamidol (ISOVUE-300) 61 % injection 95 mL 95 mL Once in Imaging 12/16/2018 12/16/2018    Sig - Route: Infuse 95 mL into a venous catheter Once. - Intravenous    ipratropium-albuterol (DUO-NEB) nebulizer solution 3 mL 3 mL Once 12/14/2018 12/14/2018    Sig - Route: Take 3 mL by nebulization 1 (One) Time. - Nebulization    metoprolol tartrate (LOPRESSOR) half tablet 12.5 mg 12.5 mg Every 12 Hours Scheduled 12/14/2018     Sig - Route: Take 1 half tablet by mouth Every 12 (Twelve) Hours. - Oral    polyethylene glycol 3350 powder (packet) 17 g Daily PRN 12/14/2018     Sig - Route: Take 17 g by mouth Daily As Needed (constipation). - Oral    sodium chloride 0.9 % flush 10 mL 10 mL As Needed 12/14/2018     Sig - Route: Infuse 10 mL into a venous catheter As Needed for Line Care. - Intravenous    Cosign for Ordering: Accepted by Regan Marion MD on 12/14/2018 12:38 PM    sodium chloride 0.9 % flush 3 mL 3 mL Every 12 Hours Scheduled 12/14/2018     Sig - Route: Infuse 3 mL into a venous catheter Every 12 (Twelve) Hours. - Intravenous    sodium chloride 0.9 % flush 3-10 mL 3-10 mL As Needed 12/14/2018     Sig - Route: Infuse 3-10 mL into a venous catheter As Needed for Line Care. - Intravenous    apixaban (ELIQUIS) tablet 5 mg (Discontinued) 5 mg Every 12 Hours Scheduled 12/14/2018 12/16/2018    Sig - Route: Take 1 tablet by mouth Every 12 (Twelve) Hours. - Oral    furosemide (LASIX) injection 40 mg  "(Discontinued) 40 mg Daily 12/14/2018 12/16/2018    Sig - Route: Infuse 4 mL into a venous catheter Daily. - Intravenous    furosemide (LASIX) injection 40 mg (Discontinued) 40 mg 2 Times Daily (Diuretics) 12/16/2018 12/17/2018    Sig - Route: Infuse 4 mL into a venous catheter 2 (Two) Times a Day. - Intravenous    potassium chloride (KLOR-CON) packet 40 mEq (Discontinued) 40 mEq As Needed 12/15/2018 12/16/2018    Sig - Route: Take 40 mEq by mouth As Needed (potassium replacement, see admin instructions). - Oral    Linked Group 1:  \"Or\" Linked Group Details        potassium chloride (MICRO-K) CR capsule 10 mEq (Discontinued) 10 mEq Daily 12/14/2018 12/17/2018    Sig - Route: Take 1 capsule by mouth Daily. - Oral    potassium chloride (MICRO-K) CR capsule 40 mEq (Discontinued) 40 mEq As Needed 12/15/2018 12/16/2018    Sig - Route: Take 4 capsules by mouth As Needed (potassium replacement.  see admin instructions). - Oral    Linked Group 1:  \"Or\" Linked Group Details        potassium chloride 10 mEq in 100 mL IVPB (Discontinued) 10 mEq Every 1 Hour PRN 12/15/2018 12/16/2018    Sig - Route: Infuse 100 mL into a venous catheter Every 1 (One) Hour As Needed (potassium protocol PERIPHERAL - see admin instructions). - Intravenous    Linked Group 1:  \"Or\" Linked Group Details              Lab Results (last 72 hours)     Procedure Component Value Units Date/Time    Peripheral Blood Smear [[id number]] Collected:  12/17/18 0858    Specimen:  Blood Updated:  12/17/18 0915    Blood Gas, Venous With Co-Ox [[id number]]  (Abnormal) Collected:  12/14/18 1447    Specimen:  Venous Blood Updated:  12/17/18 0711     Site Right Radial     pH, Venous 7.408 pH Units      pCO2, Venous 36.6 mm Hg      Comment: 84 Value below reference range        pO2, Venous 31.1 mm Hg      HCO3, Venous 23.0 mmol/L      Base Excess, Venous -1.3 mmol/L      O2 Saturation, Venous 52.4 %      Hemoglobin, Blood Gas 13.2 g/dL      Oxyhemoglobin Venous 50.0 %      " Methemoglobin Venous 0.7 %      Carboxyhemoglobin Venous 3.8 %      Comment: 83 Value above reference range        Temperature 37.0 C      Barometric Pressure for Blood Gas -- mmHg      Comment: N/A        Modality Nasal Cannula     FIO2 28 %      Ventilator Mode       Comment: Meter: [id number]     :  [id number]        Note --    Basic Metabolic Panel [[id number]]  (Abnormal) Collected:  12/16/18 0517    Specimen:  Blood Updated:  12/16/18 0648     Glucose 87 mg/dL      BUN 19 mg/dL      Creatinine 1.06 mg/dL      Sodium 141 mmol/L      Potassium 4.0 mmol/L      Chloride 109 mmol/L      CO2 26.0 mmol/L      Calcium 8.4 mg/dL      eGFR Non African Amer 69 mL/min/1.73      BUN/Creatinine Ratio 17.9     Anion Gap 6.0 mmol/L     Narrative:       National [employer] Guidelines    Stage     Description        GFR  1         Normal or High     90+  2         Mild decrease      60-89  3         Moderate decrease  30-59  4         Severe decrease    15-29  5         Kidney failure     <15    The MDRD GFR formula is only valid for adults with stable renal function between ages 18 and 70.    CBC (No Diff) [[id number]]  (Abnormal) Collected:  12/16/18 0517    Specimen:  Blood Updated:  12/16/18 0623     WBC 3.78 10*3/mm3      RBC 3.07 10*6/mm3      Hemoglobin 9.8 g/dL      Hematocrit 30.6 %      MCV 99.7 fL      MCH 31.9 pg      MCHC 32.0 g/dL      RDW 22.0 %      RDW-SD 79.0 fl      MPV 10.0 fL      Platelets 166 10*3/mm3     Potassium [[id number]]  (Normal) Collected:  12/15/18 1544    Specimen:  Blood Updated:  12/15/18 1630     Potassium 4.2 mmol/L     Basic Metabolic Panel [[id number]]  (Abnormal) Collected:  12/15/18 0523    Specimen:  Blood Updated:  12/15/18 0624     Glucose 97 mg/dL      BUN 17 mg/dL      Creatinine 1.01 mg/dL      Sodium 143 mmol/L      Potassium 3.4 mmol/L      Chloride 109 mmol/L      CO2 27.0 mmol/L      Calcium 8.6 mg/dL      eGFR Non African Amer 73 mL/min/1.73      BUN/Creatinine  Ratio 16.8     Anion Gap 7.0 mmol/L     Narrative:       National Kid[employer] Guidelines    Stage     Description        GFR  1         Normal or High     90+  2         Mild decrease      60-89  3         Moderate decrease  30-59  4         Severe decrease    15-29  5         Kidney failure     <15    The MDRD GFR formula is only valid for adults with stable renal function between ages 18 and 70.    CBC Auto Differential [[id number]]  (Abnormal) Collected:  12/15/18 0523    Specimen:  Blood Updated:  12/15/18 0609     WBC 4.05 10*3/mm3      RBC 3.05 10*6/mm3      Hemoglobin 9.9 g/dL      Hematocrit 30.6 %      .3 fL      MCH 32.5 pg      MCHC 32.4 g/dL      RDW 22.4 %      RDW-SD 79.7 fl      MPV 9.3 fL      Platelets 154 10*3/mm3      Neutrophil % 67.7 %      Lymphocyte % 22.2 %      Monocyte % 8.6 %      Eosinophil % 1.5 %      Basophil % 0.0 %      Immature Grans % 0.2 %      Neutrophils, Absolute 2.74 10*3/mm3      Lymphocytes, Absolute 0.90 10*3/mm3      Monocytes, Absolute 0.35 10*3/mm3      Eosinophils, Absolute 0.06 10*3/mm3      Basophils, Absolute 0.00 10*3/mm3      Immature Grans, Absolute 0.01 10*3/mm3     Protime-INR [[id number]]  (Abnormal) Collected:  12/14/18 1909    Specimen:  Blood Updated:  12/14/18 1955     Protime 12.0 Seconds      INR 1.14    aPTT [[id number]]  (Normal) Collected:  12/14/18 1909    Specimen:  Blood Updated:  12/14/18 1955     PTT 28.9 seconds     Narrative:       PTT = The equivalent PTT values for the therapeutic range of heparin levels at 0.3 to 0.5 U/ml are 55 to 70 seconds.    Comprehensive Metabolic Panel [[id number]]  (Abnormal) Collected:  12/14/18 1225    Specimen:  Blood Updated:  12/14/18 1338     Glucose 120 mg/dL      BUN 17 mg/dL      Creatinine 1.11 mg/dL      Sodium 138 mmol/L      Potassium 3.8 mmol/L      Chloride 107 mmol/L      CO2 22.0 mmol/L      Calcium 9.1 mg/dL      Total Protein 6.9 g/dL      Albumin 4.40 g/dL      ALT (SGPT) 16 U/L      AST  (SGOT) 31 U/L      Alkaline Phosphatase 44 U/L      Total Bilirubin 6.7 mg/dL      eGFR Non African Amer 65 mL/min/1.73      Globulin 2.5 gm/dL      A/G Ratio 1.8 g/dL      BUN/Creatinine Ratio 15.3     Anion Gap 9.0 mmol/L     Narrative:       National Kid[employer] Guidelines    Stage     Description        GFR  1         Normal or High     90+  2         Mild decrease      60-89  3         Moderate decrease  30-59  4         Severe decrease    15-29  5         Kidney failure     <15    The MDRD GFR formula is only valid for adults with stable renal function between ages 18 and 70.    Troponin [[id number]]  (Normal) Collected:  12/14/18 1225    Specimen:  Blood Updated:  12/14/18 1338     Troponin I 0.030 ng/mL      Comment: Results may be falsely decreased if patient taking Biotin.       CBC & Differential [[id number]] Collected:  12/14/18 1225    Specimen:  Blood Updated:  12/14/18 1338    Narrative:       The following orders were created for panel order CBC & Differential.  Procedure                               Abnormality         Status                     ---------                               -----------         ------                     CBC Auto Differential[[id number]]        Abnormal            Final result                 Please view results for these tests on the individual orders.    CBC Auto Differential [[id number]]  (Abnormal) Collected:  12/14/18 1225    Specimen:  Blood Updated:  12/14/18 1338     WBC 8.26 10*3/mm3      RBC 3.51 10*6/mm3      Hemoglobin 11.3 g/dL      Hematocrit 35.0 %      MCV 99.7 fL      MCH 32.2 pg      MCHC 32.3 g/dL      RDW 22.3 %      RDW-SD 79.0 fl      MPV 9.8 fL      Platelets 224 10*3/mm3      Neutrophil % 91.8 %      Lymphocyte % 6.3 %      Monocyte % 1.7 %      Eosinophil % 0.1 %      Basophil % 0.1 %      Immature Grans % 0.6 %      Neutrophils, Absolute 7.58 10*3/mm3      Lymphocytes, Absolute 0.52 10*3/mm3      Monocytes, Absolute 0.14 10*3/mm3       Eosinophils, Absolute 0.01 10*3/mm3      Basophils, Absolute 0.01 10*3/mm3      Immature Grans, Absolute 0.05 10*3/mm3     [place] Draw [[id number]] Collected:  12/14/18 1225    Specimen:  Blood Updated:  12/14/18 1331    Narrative:       The following orders were created for panel order [place] Draw.  Procedure                               Abnormality         Status                     ---------                               -----------         ------                     Light Blue Top[[id number]]                                   Final result               Green Top (Gel)[[id number]]                                  Final result               Lavender Top[[id number]]                                     Final result               Gold Top - SST[[id number]]                                   Final result               Green Top (No Gel)[[id number]]                                                            Please view results for these tests on the individual orders.    Light Blue Top [[id number]] Collected:  12/14/18 1225    Specimen:  Blood Updated:  12/14/18 1331     Extra Tube hold for add-on     Comment: Auto resulted       Green Top (Gel) [[id number]] Collected:  12/14/18 1225    Specimen:  Blood Updated:  12/14/18 1331     Extra Tube Hold for add-ons.     Comment: Auto resulted.       Lavender Top [[id number]] Collected:  12/14/18 1225    Specimen:  Blood Updated:  12/14/18 1331     Extra Tube hold for add-on     Comment: Auto resulted       Gold Top - SST [[id number]] Collected:  12/14/18 1225    Specimen:  Blood Updated:  12/14/18 1331     Extra Tube Hold for add-ons.     Comment: Auto resulted.       BNP [[id number]]  (Abnormal) Collected:  12/14/18 1259    Specimen:  Blood Updated:  12/14/18 1321     .0 pg/mL      Comment: Results may be falsely decreased if patient taking Biotin.             Imaging Results (last 72 hours)     Procedure Component Value Units Date/Time    CT Chest Without Contrast [[id number]]  Collected:  12/17/18 1047     Updated:  12/17/18 1047    Narrative:       EXAMINATION: CT CHEST WO CONTRAST-, CT ABDOMEN PELVIS W WO CONTRAST-  12/16/2018     INDICATION: Pleural effusion; J44.1-Chronic obstructive pulmonary  disease with (acute) exacerbation; J96.21-Acute and chronic respiratory  failure with hypoxia; E80.4-Gilbert syndrome      TECHNIQUE: CT chest without intravenous contrast administration, CT  abdomen and pelvis with and without intravenous contrast administration.     The radiation dose reduction device was turned on for each scan per the  ALARA (As Low as Reasonably Achievable) protocol.     COMPARISON: Ultrasound abdomen 12/14/2018     FINDINGS:      CHEST: Thyroid is homogeneous in attenuation. No bulky mediastinal  adenopathy. Central airways are patent. Esophagus in normal course and  caliber. Atherosclerotic nonaneurysmal thoracic aorta. Cardiac size  borderline enlarged with moderate pericardial fluid along the anterior  margin which has mild lobulated margins. Atherosclerotic calcifications  are present. Extended lung windows demonstrate upper lobe predominant  mild centrilobular emphysema. Small bilateral pleural effusions right  greater than left with adjacent atelectasis. No distinct focal  consolidation otherwise noted.  Degenerative changes of the spine  without aggressive osseous lesion. No soft tissue body wall findings  specifically no bulky axillary adenopathy.     ABDOMEN AND PELVIS: Liver without focal lesion. Gallbladder surgically  absent. No intrahepatic biliary dilatation. Poor visualization of the  common bile duct without gross dilatation identified. Pancreas  unremarkable. Spleen is enlarged to 20 cm in craniocaudal dimension with  splenule along the inferior medial margin. Adrenals without distinct  nodule. Kidneys without hydronephrosis or hydroureter. No bulky  retroperitoneal adenopathy. GI tract evaluation without focal thickening  or disproportionate dilatation  of bowel. Appendix is visualized and  unremarkable. No free fluid or intra-abdominal free air. Pelvic viscera  grossly unremarkable with moderately enlarged prostate demonstrating  calcifications and a decompressed urinary bladder. No free pelvic fluid  or bulky pelvic adenopathy. Degenerative changes of the spine without  aggressive osseous or soft tissue body wall lesions of concern. Portal  veins and IVC patent. Atherosclerotic nonaneurysmal patent abdominal  aorta.       Impression:       1. Small bilateral pleural effusions right greater than left with  adjacent atelectasis.  2. Small to moderate pericardial fluid along the right heart margin  which has a slight lobulated appearance however no calcifications  concerning for potential pericardial disease process.  3. Status post cholecystectomy without gross intrahepatic or  extrahepatic biliary dilatation of limited visualization. If there is  further concern for biliary obstructive process recommend MRCP for  further evaluation.  4. Spleen is enlarged to 20 cm in craniocaudal dimension without  ascites.     D:  12/17/2018  E:  12/17/2018          CT Abdomen Pelvis With & Without Contrast [[id number]] Collected:  12/17/18 1047     Updated:  12/17/18 1047    Narrative:       EXAMINATION: CT CHEST WO CONTRAST-, CT ABDOMEN PELVIS W WO CONTRAST-  12/16/2018     INDICATION: Pleural effusion; J44.1-Chronic obstructive pulmonary  disease with (acute) exacerbation; J96.21-Acute and chronic respiratory  failure with hypoxia; E80.4-Gilbert syndrome      TECHNIQUE: CT chest without intravenous contrast administration, CT  abdomen and pelvis with and without intravenous contrast administration.     The radiation dose reduction device was turned on for each scan per the  ALARA (As Low as Reasonably Achievable) protocol.     COMPARISON: Ultrasound abdomen 12/14/2018     FINDINGS:      CHEST: Thyroid is homogeneous in attenuation. No bulky mediastinal  adenopathy. Central  airways are patent. Esophagus in normal course and  caliber. Atherosclerotic nonaneurysmal thoracic aorta. Cardiac size  borderline enlarged with moderate pericardial fluid along the anterior  margin which has mild lobulated margins. Atherosclerotic calcifications  are present. Extended lung windows demonstrate upper lobe predominant  mild centrilobular emphysema. Small bilateral pleural effusions right  greater than left with adjacent atelectasis. No distinct focal  consolidation otherwise noted.  Degenerative changes of the spine  without aggressive osseous lesion. No soft tissue body wall findings  specifically no bulky axillary adenopathy.     ABDOMEN AND PELVIS: Liver without focal lesion. Gallbladder surgically  absent. No intrahepatic biliary dilatation. Poor visualization of the  common bile duct without gross dilatation identified. Pancreas  unremarkable. Spleen is enlarged to 20 cm in craniocaudal dimension with  splenule along the inferior medial margin. Adrenals without distinct  nodule. Kidneys without hydronephrosis or hydroureter. No bulky  retroperitoneal adenopathy. GI tract evaluation without focal thickening  or disproportionate dilatation of bowel. Appendix is visualized and  unremarkable. No free fluid or intra-abdominal free air. Pelvic viscera  grossly unremarkable with moderately enlarged prostate demonstrating  calcifications and a decompressed urinary bladder. No free pelvic fluid  or bulky pelvic adenopathy. Degenerative changes of the spine without  aggressive osseous or soft tissue body wall lesions of concern. Portal  veins and IVC patent. Atherosclerotic nonaneurysmal patent abdominal  aorta.       Impression:       1. Small bilateral pleural effusions right greater than left with  adjacent atelectasis.  2. Small to moderate pericardial fluid along the right heart margin  which has a slight lobulated appearance however no calcifications  concerning for potential pericardial disease  process.  3. Status post cholecystectomy without gross intrahepatic or  extrahepatic biliary dilatation of limited visualization. If there is  further concern for biliary obstructive process recommend MRCP for  further evaluation.  4. Spleen is enlarged to 20 cm in craniocaudal dimension without  ascites.     D:  12/17/2018  E:  12/17/2018          US Abdomen Limited [[id number]] Collected:  12/15/18 1438     Updated:  12/15/18 2304    Narrative:       EXAMINATION: US ABDOMEN LIMITED - 12/15/2018     INDICATION: J44.1-Chronic obstructive pulmonary disease with (acute)  exacerbation; J96.21-Acute and chronic respiratory failure with hypoxia;  E80.4-Gilbert syndrome.     TECHNIQUE: Ultrasound of all 4 quadrants of the abdomen and pelvis for  ascites.     COMPARISON: None.     FINDINGS: Trace free fluid is noted in both pleural spaces. No ascites  is seen in the abdomen or pelvis. No abnormally dilated bowel loops are  seen.       Impression:       Small pleural effusions noted. No evidence of ascites.      DICTATED:   12/15/2018  EDITED/ls :   12/15/2018      This report was finalized on 12/15/2018 11:02 PM by DR. Chandra Horn MD.       XR Chest 2 View [[id number]] Collected:  12/14/18 1308     Updated:  12/14/18 1332    Narrative:       EXAMINATION: XR CHEST 2 VW- 12/14/2018     INDICATION: dyspnea     TECHNIQUE: Two view chest.     COMPARISONS: 09/22/2017     FINDINGS: Small volume bilateral pleural effusions and adjacent opacity.  Prominent cardiopericardial silhouette. Atherosclerosis aortic knob. No  pneumothorax.       Impression:       Small volume bilateral pleural effusions.     D:  12/14/2018  E:  12/14/2018     This report was finalized on 12/14/2018 1:30 PM by Uriel Mcneal.             ECG/EMG Results (last 72 hours)     Procedure Component Value Units Date/Time    Adult Transthoracic Echo Complete W/ Cont if Necessary Per Protocol [[id number]] Collected:  12/15/18 0834     Updated:  12/15/18 1535     BSA 2.2  m^2       CV ECHO VIPUL - RVDD 3.7 cm      IVSd 1.3 cm      LVIDd 5.0 cm      LVIDs 3.6 cm      LVPWd 1.2 cm      IVS/LVPW 1.0     FS 27.7 %      EDV(Teich) 116.4 ml      ESV(Teich) 54.2 ml      EF(Teich) 53.5 %      EDV(cubed) 122.5 ml      ESV(cubed) 46.4 ml      EF(cubed) 62.2 %      LV mass(C)d 241.4 grams      LV mass(C)dI 107.9 grams/m^2      SV(Teich) 62.3 ml      SI(Teich) 27.8 ml/m^2      SV(cubed) 76.2 ml      SI(cubed) 34.0 ml/m^2      Ao root diam 3.2 cm      Ao root area 8.3 cm^2      LA dimension 5.4 cm      LA/Ao 1.7     LAd major 7.3 cm      LVLd ap4 8.1 cm      EDV(MOD-sp4) 143.0 ml      LVLs ap4 7.3 cm      ESV(MOD-sp4) 67.0 ml      EF(MOD-sp4) 53.1 %      LVLd ap2 8.1 cm      EDV(MOD-sp2) 143.0 ml      LVLs ap2 7.3 cm      ESV(MOD-sp2) 67.0 ml      EF(MOD-sp2) 53.1 %      LA volume 181.0 ml      EF(MOD-bp) 54.0 %      SV(MOD-sp4) 76.0 ml      SI(MOD-sp4) 34.0 ml/m^2      SV(MOD-sp2) 76.0 ml      SI(MOD-sp2) 34.0 ml/m^2      Ao root area (BSA corrected) 1.4     LV Garrison Vol (BSA corrected) 63.9 ml/m^2      LV Sys Vol (BSA corrected) 29.9 ml/m^2      LA Volume Index 80.9 ml/m^2      MV E max timothy 120.7 cm/sec      PA acc slope 762.4 cm/sec^2      PA acc time 0.1 sec      PI end-d timothy 140.3 cm/sec      TR max timothy 299.9 cm/sec       CV ECHO VIPUL - TR MAX PG 36.0 mmHg      RVSP(TR) 39.0 mmHg      RAP systole 3.0 mmHg      PA pr(Accel) 33.8 mmHg      Lat E/e'  13.4     Med E/e' 11.7     Lat Peak E' Timothy 8.8 cm/sec      Med Peak E' Timothy 10.2 cm/sec       CV ECHO VIPUL - BZI_BMI 32.1 kilograms/m^2       CV ECHO VIPUL - BSA(HAYCOCK) 2.3 m^2       CV ECHO VIPUL - BZI_METRIC_WEIGHT 104.3 kg       CV ECHO VIPUL - BZI_METRIC_HEIGHT 180.3 cm      Avg E/e' ratio 12.71      CV VAS BP RIGHT /58 mmHg      TDI S' 13.70 cm/sec      RV Base 5.50 cm      RV Length 8.80 cm      RV Mid 3.90 cm      TAPSE (>1.6) 1.50 cm2     Narrative:       · Bi-atrial and right ventricular enlargement is present.  · The LV  ejection fraction is normal; mild concentric LVH is seen.  · There is aortic valve sclerosis with trace aortic insufficiency.  · There is mitral annular calcification with mild mitral regurgitation.  · The estimated PA pressure is mildly elevated.  · There is a trivial pericardial effusion.                Physician Progress Notes (last 72 hours) (Notes from 2018 12:46 PM through 2018 12:46 PM)      Marni Schmidt MD at 2018 11:16 AM              [facility] Medicine Services  PROGRESS NOTE    Patient Name: Smith Craven  : 1950  MRN: [id number]    Date of Admission: 2018  Length of Stay: 3  Primary Care Physician: Jeane Baird MD    Subjective   Subjective     CC:  F/U SOA, BLE edema, abdominal distension    HPI:  Patient is still short of breath even at rest.  He has had good diuresis. Cant lie on his left side because of fullness.  He denies chest pain  Review of Systems  Gen-no fevers, no chills  CV-no chest pain, no palpitations  Resp-no cough, +dyspnea  GI-no N/V/D, no abd pain, +abdominal swelling    Otherwise ROS is negative except as mentioned in the HPI.    Objective   Objective     Vital Signs:   Temp:  [97.5 °F (36.4 °C)-98.8 °F (37.1 °C)] 98.8 °F (37.1 °C)  Heart Rate:  [60-80] 80  Resp:  [16-18] 18  BP: (110-118)/(59-80) 110/80        Physical Exam:  Patient is alert and talkative,short of breath sitting still  Neck is without mass or JVD  Heart is Reg wo murmur, I do not appreciate a rub  Lungs are clear wo wheeze or crackle-- shallow and decreased in the bases  Abd is soft without mass appreciated, not tender but is protuberant  MAEW  Skin is without rash  Neurologic exam in nonfocal   Mood is appropriate  No LAD  Results Reviewed:  I have personally reviewed current lab, radiology, and data and agree.    Results from last 7 days   Lab Units  18   0517  12/15/18   0523  18   1909  18   1225   WBC 10*3/mm3  3.78  4.05   --    8.26   HEMOGLOBIN g/dL  9.8*  9.9*   --   11.3*   HEMATOCRIT %  30.6*  30.6*   --   35.0*   PLATELETS 10*3/mm3  166  154   --   224   INR    --    --   1.14*   --      Results from last 7 days   Lab Units  12/16/18   0517  12/15/18   1544  12/15/18   0523  12/14/18   1225   SODIUM mmol/L  141   --   143  138   POTASSIUM mmol/L  4.0  4.2  3.4*  3.8   CHLORIDE mmol/L  109   --   109  107   CO2 mmol/L  26.0   --   27.0  22.0   BUN mg/dL  19   --   17  17   CREATININE mg/dL  1.06   --   1.01  1.11   GLUCOSE mg/dL  87   --   97  120*   CALCIUM mg/dL  8.4*   --   8.6*  9.1   ALT (SGPT) U/L   --    --    --   16   AST (SGOT) U/L   --    --    --   31   TROPONIN I ng/mL   --    --    --   0.030     Estimated Creatinine Clearance: 80.3 mL/min (by C-G formula based on SCr of 1.06 mg/dL).  BNP   Date Value Ref Range Status   12/14/2018 421.0 (H) 0.0 - 100.0 pg/mL Final     Comment:     Results may be falsely decreased if patient taking Biotin.       Microbiology Results Abnormal     None          Ct Abdomen Pelvis With & Without Contrast    Result Date: 12/17/2018  Impression: 1. Small bilateral pleural effusions right greater than left with adjacent atelectasis. 2. Small to moderate pericardial fluid along the right heart margin which has a slight lobulated appearance however no calcifications concerning for potential pericardial disease process. 3. Status post cholecystectomy without gross intrahepatic or extrahepatic biliary dilatation of limited visualization. If there is further concern for biliary obstructive process recommend MRCP for further evaluation. 4. Spleen is enlarged to 20 cm in craniocaudal dimension without ascites.  D:  12/17/2018 E:  12/17/2018       Xr Chest 2 View    Result Date: 12/14/2018  Impression: Small volume bilateral pleural effusions.  D:  12/14/2018 E:  12/14/2018  This report was finalized on 12/14/2018 1:30 PM by Uriel Mcneal.      Ct Chest Without Contrast    Result Date:  12/17/2018  Impression: 1. Small bilateral pleural effusions right greater than left with adjacent atelectasis. 2. Small to moderate pericardial fluid along the right heart margin which has a slight lobulated appearance however no calcifications concerning for potential pericardial disease process. 3. Status post cholecystectomy without gross intrahepatic or extrahepatic biliary dilatation of limited visualization. If there is further concern for biliary obstructive process recommend MRCP for further evaluation. 4. Spleen is enlarged to 20 cm in craniocaudal dimension without ascites.  D:  12/17/2018 E:  12/17/2018       Us Abdomen Limited    Result Date: 12/15/2018  Impression: Small pleural effusions noted. No evidence of ascites.   DICTATED:   12/15/2018 EDITED/ls :   12/15/2018  This report was finalized on 12/15/2018 11:02 PM by DR. Chandra Horn MD.        Results for orders placed during the hospital encounter of 12/14/18   Adult Transthoracic Echo Complete W/ Cont if Necessary Per Protocol    Narrative · Bi-atrial and right ventricular enlargement is present.  · The LV ejection fraction is normal; mild concentric LVH is seen.  · There is aortic valve sclerosis with trace aortic insufficiency.  · There is mitral annular calcification with mild mitral regurgitation.  · The estimated PA pressure is mildly elevated.  · There is a trivial pericardial effusion.          I have reviewed the medications.      budesonide-formoterol 2 puff Inhalation BID - RT   metoprolol tartrate 12.5 mg Oral Q12H   sodium chloride 3 mL Intravenous Q12H         Assessment/Plan   Assessment / Plan       SOB (shortness of breath)    Splenomegaly    Atrial fibrillation (CMS/HCC)    Obesity (BMI 30-39.9)    ANN MARIE on CPAP    COPD (chronic obstructive pulmonary disease) (CMS/HCC)    Gilbert's syndrome    Pleural effusion, bilateral    Fatty liver    Mitral valve regurgitation, MILD MR    Chronic anemia    Pericardial effusion       Brief  Hospital Course to date:  Smith Craven is a 68 y.o. male with hx of chronic hypoxic respiratory failure, COPD, ANN MARIE, DHF, mitral regurgitation, Afib on Eliquis, chronic splenomegaly, Gilbert's syndrome with chronic hyperbilirubinemia, and fatty liver (determined by biopsy summer 2018) who presents due to 2-3 day history of worsening dyspnea, lower extremity edema, and abdominal distension. ER evaluation showed elevated , negative troponins, and CXR with small bilateral pleural effusions. Admitted to hospitalists.     Assessment & Plan:  --Stop IV Lasix. Echo is improved with normal EF and mild MR  I believe dyspnea is restrictive- due to pericardial disease and or splenomegaly as well as pleural effusions, but am not convinced that the fluid is congestive.  I have discussed with Dr Hankins who recommends a BM Bx and patient is aggreeable (last eliquis was am of )      --Abdominal ultrasound shows no ascites. He has chronic splenomegaly and follows with a hematologist Dr. Ramirez in [place] (attempted to get records but office is closed). He also had a liver biopsy confirming fatty liver only, no cirrhosis.      DVT Prophylaxis: Eliquis-- held today    CODE STATUS:   Code Status and Medical Interventions:   Ordered at: 18 1632     Level Of Support Discussed With:    Patient     Code Status:    CPR     Medical Interventions (Level of Support Prior to Arrest):    Full       Disposition: I expect the patient to be discharged home in ~1-2      Electronically signed by Marni Schmidt MD, 18, 11:16 AM.        Electronically signed by Marni Schmidt MD at 2018 11:26 AM     Marni Schmidt MD at 2018 10:45 AM              [facility] Medicine Services  PROGRESS NOTE    Patient Name: Smith Craven  : 1950  MRN: [id number]    Date of Admission: 2018  Length of Stay: 2  Primary Care Physician: Jeane Baird MD    Subjective   Subjective     CC:  F/U  SOA, BLE edema, abdominal distension    HPI:  Patient is still short of breath with any exertion. Cant lie on his left side because of fullness.  He denies chest pain  Review of Systems  Gen-no fevers, no chills  CV-no chest pain, no palpitations  Resp-no cough, +dyspnea  GI-no N/V/D, no abd pain, +abdominal swelling    Otherwise ROS is negative except as mentioned in the HPI.    Objective   Objective     Vital Signs:   Temp:  [97.5 °F (36.4 °C)-98 °F (36.7 °C)] 97.7 °F (36.5 °C)  Heart Rate:  [61-73] 73  Resp:  [15-16] 16  BP: (113-136)/(60-79) 118/79        Physical Exam:  Gen-no acute distress-- though breathes shallow  HENT-NCAT, mucous membranes moist  CV-RRR, S1 S2 normal, no m/r/g  Resp-mildly diminished at the bases, no wheezes or rales, nonlabored  Abd-distended but remains soft, NT, +BS- very protuberant  Ext-2+ pitting BLE edema  Neuro-A&Ox3, no focal deficits  Skin-no rashes  Psych-appropriate mood    Results Reviewed:  I have personally reviewed current lab, radiology, and data and agree.    Results from last 7 days   Lab Units  12/16/18   0517  12/15/18   0523 12/14/18   1909  12/14/18   1225   WBC 10*3/mm3  3.78  4.05   --   8.26   HEMOGLOBIN g/dL  9.8*  9.9*   --   11.3*   HEMATOCRIT %  30.6*  30.6*   --   35.0*   PLATELETS 10*3/mm3  166  154   --   224   INR    --    --   1.14*   --      Results from last 7 days   Lab Units  12/16/18   0517  12/15/18   1544  12/15/18   0523  12/14/18   1225   SODIUM mmol/L  141   --   143  138   POTASSIUM mmol/L  4.0  4.2  3.4*  3.8   CHLORIDE mmol/L  109   --   109  107   CO2 mmol/L  26.0   --   27.0  22.0   BUN mg/dL  19   --   17  17   CREATININE mg/dL  1.06   --   1.01  1.11   GLUCOSE mg/dL  87   --   97  120*   CALCIUM mg/dL  8.4*   --   8.6*  9.1   ALT (SGPT) U/L   --    --    --   16   AST (SGOT) U/L   --    --    --   31   TROPONIN I ng/mL   --    --    --   0.030     Estimated Creatinine Clearance: 82.5 mL/min (by C-G formula based on SCr of 1.06  mg/dL).  BNP   Date Value Ref Range Status   12/14/2018 421.0 (H) 0.0 - 100.0 pg/mL Final     Comment:     Results may be falsely decreased if patient taking Biotin.       Microbiology Results Abnormal     None          Imaging Results (last 24 hours)     Procedure Component Value Units Date/Time    US Abdomen Limited [[id number]] Collected:  12/15/18 1438     Updated:  12/15/18 2304    Narrative:       EXAMINATION: US ABDOMEN LIMITED - 12/15/2018     INDICATION: J44.1-Chronic obstructive pulmonary disease with (acute)  exacerbation; J96.21-Acute and chronic respiratory failure with hypoxia;  E80.4-Gilbert syndrome.     TECHNIQUE: Ultrasound of all 4 quadrants of the abdomen and pelvis for  ascites.     COMPARISON: None.     FINDINGS: Trace free fluid is noted in both pleural spaces. No ascites  is seen in the abdomen or pelvis. No abnormally dilated bowel loops are  seen.       Impression:       Small pleural effusions noted. No evidence of ascites.      DICTATED:   12/15/2018  EDITED/ls :   12/15/2018      This report was finalized on 12/15/2018 11:02 PM by DR. Chandra Horn MD.           Results for orders placed during the hospital encounter of 12/14/18   Adult Transthoracic Echo Complete W/ Cont if Necessary Per Protocol    Narrative · Bi-atrial and right ventricular enlargement is present.  · The LV ejection fraction is normal; mild concentric LVH is seen.  · There is aortic valve sclerosis with trace aortic insufficiency.  · There is mitral annular calcification with mild mitral regurgitation.  · The estimated PA pressure is mildly elevated.  · There is a trivial pericardial effusion.          I have reviewed the medications.      budesonide-formoterol 2 puff Inhalation BID - RT   furosemide 40 mg Intravenous BID   metoprolol tartrate 12.5 mg Oral Q12H   potassium chloride 10 mEq Oral Daily   sodium chloride 3 mL Intravenous Q12H         Assessment/Plan   Assessment / Plan     Active Hospital Problems     Diagnosis   • **Acute on chronic diastolic heart failure (CMS/HCC)     LAUREN 6/20/2016 - EF 50%, moderate to severe MR       • Hypokalemia   • Fatty liver   • Mitral valve regurgitation, moderate to severe   • Chronic anemia   • Hyperbilirubinemia   • Abdominal distention   • Splenomegaly, congestive, chronic   • Pleural effusion, bilateral   • Gilbert's syndrome   • COPD (chronic obstructive pulmonary disease) (CMS/HCC)     Mild to moderate obstruction on Oct 2016 PFTs     • ANN MARIE on CPAP     CPAP compliant     • Obesity (BMI 30-39.9)     BMI 35.5     • SOB (shortness of breath)   • Atrial fibrillation (CMS/HCC)     d. CHADS2 = 0.  e. Event recorder, December 2007: Sinus bradycardia with heart rate in the 30 to 40 BPM range.    f. Holter monitor, 01/10/2013: Atrial fibrillation with ventricular response of  BPM and rare PACs.            Brief Hospital Course to date:  Smith Craven is a 68 y.o. male with hx of chronic hypoxic respiratory failure, COPD, ANN MARIE, DHF, mitral regurgitation, Afib on Eliquis, chronic splenomegaly, Gilbert's syndrome with chronic hyperbilirubinemia, and fatty liver (determined by biopsy summer 2018) who presents due to 2-3 day history of worsening dyspnea, lower extremity edema, and abdominal distension. ER evaluation showed elevated , negative troponins, and CXR with small bilateral pleural effusions. Admitted to hospitalists.     Assessment & Plan:  --Continue IV Lasix. Echo is improved with normal EF and mild MR  (Has hx of severe mitral regurgitation. Consider Cardiology consult (sees Dr. Napier)     --Abdominal ultrasound shows no ascites. He has chronic splenomegaly and follows with a hematologist Dr. Ramriez in [place] (attempted to get records but office is closed). He also had a liver biopsy confirming fatty liver only, no cirrhosis.    -- still something doesn't fit-- will repeat CT of chest A and P - assess ofr possible lymphoma or mass in his spleen. Multitude of subchronic  "issues that have no unifying diagnosis.      DVT Prophylaxis: Eliquis-- held today    CODE STATUS:   Code Status and Medical Interventions:   Ordered at: 18 1632     Level Of Support Discussed With:    Patient     Code Status:    CPR     Medical Interventions (Level of Support Prior to Arrest):    Full       Disposition: I expect the patient to be discharged home in ~2-3 days      Electronically signed by Marni Schmidt MD, 18, 1:15 PM.        Electronically signed by Marni Schmidt MD at 2018  2:59 PM     Hattie Ojeda MD at 12/15/2018 11:59 AM              [facility] Medicine Services  PROGRESS NOTE    Patient Name: Smith Craven  : 1950  MRN: [id number]    Date of Admission: 2018  Length of Stay: 1  Primary Care Physician: Jeane Baird MD    Subjective   Subjective     CC:  F/U SOA, BLE edema, abdominal distension    HPI:  Patient seen this morning. Family at bedside. He says his SOA is slightly better following Lasix. Abdomen remains distended and \"feels hard.\" His abdomen has been swollen for some time, but worse over the past few days. Family says he is not wearing his CPAP, just oxygen at night. He is supposed to wear oxygen during the day but does not do so.     Review of Systems  Gen-no fevers, no chills  CV-no chest pain, no palpitations  Resp-no cough, +dyspnea  GI-no N/V/D, no abd pain, +abdominal swelling    Otherwise ROS is negative except as mentioned in the HPI.    Objective   Objective     Vital Signs:   Temp:  [97.3 °F (36.3 °C)-98.5 °F (36.9 °C)] 97.3 °F (36.3 °C)  Heart Rate:  [64-81] 73  Resp:  [17-24] 17  BP: (107-151)/(55-78) 122/59        Physical Exam:  Gen-no acute distress  HENT-NCAT, mucous membranes moist  CV-RRR, S1 S2 normal, no m/r/g  Resp-mildly diminished at the bases, no wheezes or rales, nonlabored  Abd-distended but remains soft, NT, +BS  Ext-2+ pitting BLE edema  Neuro-A&Ox3, no focal deficits  Skin-no " kevin  Psych-appropriate mood    Results Reviewed:  I have personally reviewed current lab, radiology, and data and agree.    Results from last 7 days   Lab Units  12/15/18   0523  12/14/18   1909  12/14/18   1225   WBC 10*3/mm3  4.05   --   8.26   HEMOGLOBIN g/dL  9.9*   --   11.3*   HEMATOCRIT %  30.6*   --   35.0*   PLATELETS 10*3/mm3  154   --   224   INR    --   1.14*   --      Results from last 7 days   Lab Units  12/15/18   0523  12/14/18   1225   SODIUM mmol/L  143  138   POTASSIUM mmol/L  3.4*  3.8   CHLORIDE mmol/L  109  107   CO2 mmol/L  27.0  22.0   BUN mg/dL  17  17   CREATININE mg/dL  1.01  1.11   GLUCOSE mg/dL  97  120*   CALCIUM mg/dL  8.6*  9.1   ALT (SGPT) U/L   --   16   AST (SGOT) U/L   --   31   TROPONIN I ng/mL   --   0.030     Estimated Creatinine Clearance: 83.6 mL/min (by C-G formula based on SCr of 1.01 mg/dL).  BNP   Date Value Ref Range Status   12/14/2018 421.0 (H) 0.0 - 100.0 pg/mL Final     Comment:     Results may be falsely decreased if patient taking Biotin.       Microbiology Results Abnormal     None          Imaging Results (last 24 hours)     Procedure Component Value Units Date/Time    US Abdomen Limited [[id number]] Updated:  12/14/18 2020    XR Chest 2 View [[id number]] Collected:  12/14/18 1308     Updated:  12/14/18 1332    Narrative:       EXAMINATION: XR CHEST 2 VW- 12/14/2018     INDICATION: dyspnea     TECHNIQUE: Two view chest.     COMPARISONS: 09/22/2017     FINDINGS: Small volume bilateral pleural effusions and adjacent opacity.  Prominent cardiopericardial silhouette. Atherosclerosis aortic knob. No  pneumothorax.       Impression:       Small volume bilateral pleural effusions.     D:  12/14/2018  E:  12/14/2018     This report was finalized on 12/14/2018 1:30 PM by Uriel Mcneal.           Results for orders placed in visit on 07/24/18   Adult Transthoracic Echo Complete W/ Cont if Necessary Per Protocol    Narrative · There is severe left atrial enlargement.  ·  Global and segmental LV wall motion is normal. The calculated EF is 64%.  · Right atrium and ventricle are normal in size.  · RV systolic function is normal. There is no evidence of volume or   pressure overload.  · There is no evidence of pulmonary artery hypertension. There is no   tricuspid regurgitation.  · There is mitral annular calcification and moderate - severe PA   hypertension.  · Aortic valve sclerosis is present.  · There is a trivial circumferential pericardial effusion.          I have reviewed the medications.      apixaban 5 mg Oral Q12H   budesonide-formoterol 2 puff Inhalation BID - RT   furosemide 40 mg Intravenous Daily   metoprolol tartrate 12.5 mg Oral Q12H   potassium chloride 10 mEq Oral Daily   sodium chloride 3 mL Intravenous Q12H         Assessment/Plan   Assessment / Plan     Active Hospital Problems    Diagnosis   • **Acute on chronic diastolic heart failure (CMS/HCC)     LAUREN 6/20/2016 - EF 50%, moderate to severe MR       • Abdominal distention   • Pleural effusion, bilateral   • SOB (shortness of breath)   • Hypokalemia   • Fatty liver   • Mitral valve regurgitation, moderate to severe   • Chronic anemia   • Hyperbilirubinemia   • Splenomegaly, congestive, chronic   • Gilbert's syndrome   • COPD (chronic obstructive pulmonary disease) (CMS/HCC)     Mild to moderate obstruction on Oct 2016 PFTs     • ANN MARIE on CPAP     CPAP compliant     • Obesity (BMI 30-39.9)     BMI 35.5     • Atrial fibrillation (CMS/HCC)     g. CHADS2 = 0.  h. Event recorder, December 2007: Sinus bradycardia with heart rate in the 30 to 40 BPM range.    i. Holter monitor, 01/10/2013: Atrial fibrillation with ventricular response of  BPM and rare PACs.            Brief Hospital Course to date:  Smith Craven is a 68 y.o. male with hx of chronic hypoxic respiratory failure, COPD, ANN MARIE, DHF, mitral regurgitation, Afib on Eliquis, chronic splenomegaly, Gilbert's syndrome with chronic hyperbilirubinemia, and fatty  liver (determined by biopsy summer 2018) who presents due to 2-3 day history of worsening dyspnea, lower extremity edema, and abdominal distension. ER evaluation showed elevated , negative troponins, and CXR with small bilateral pleural effusions. Admitted to hospitalists.     Assessment & Plan:  --Continue IV Lasix. Echo pending. Has hx of severe mitral regurgitation. Consider Cardiology consult (sees Dr. Napier) if anything looks different on Echo.  --Abdominal ultrasound pending. If this shows ascites, will likely need paracentesis. He has chronic splenomegaly and follows with a hematologist Dr. Ramirez in [place] (attempted to get records but office is closed). He also had a liver biopsy confirming fatty liver only, no cirrhosis.    --Replace K.    DVT Prophylaxis: Eliquis    CODE STATUS:   Code Status and Medical Interventions:   Ordered at: 12/14/18 1632     Level Of Support Discussed With:    Patient     Code Status:    CPR     Medical Interventions (Level of Support Prior to Arrest):    Full       Disposition: I expect the patient to be discharged home in ~2-3 days      Electronically signed by Hattie Ojeda MD, 12/15/18, 12:14 PM.        Electronically signed by Hattie Ojeda MD at 12/15/2018 12:15 PM       Consult Notes (last 72 hours) (Notes from 12/14/2018 12:46 PM through 12/17/2018 12:46 PM)     No notes of this type exist for this encounter.

## 2018-12-17 NOTE — PROGRESS NOTES
Discharge Planning Assessment  [facility]     Patient Name: Smith Craven  MRN: [id number]  Today's Date: 12/17/2018    Admit Date: 12/14/2018    Discharge Needs Assessment     Row Name 12/17/18 1235       Living Environment    Lives With  spouse;child(sánchez), adult    Current Living Arrangements  home/apartment/condo    Primary Care Provided by  self    Provides Primary Care For  no one    Family Caregiver if Needed  child(sánchez), adult;spouse    Quality of Family Relationships  helpful;involved;supportive    Able to Return to Prior Arrangements  yes       Resource/Environmental Concerns    Resource/Environmental Concerns  none       Transition Planning    Patient/Family Anticipates Transition to  home with family    Patient/Family Anticipated Services at Transition  none    Transportation Anticipated  family or friend will provide       Discharge Needs Assessment    Readmission Within the Last 30 Days  no previous admission in last 30 days    Concerns to be Addressed  denies needs/concerns at this time    Equipment Currently Used at Home  bipap/cpap;oxygen Home O2 at 2L qhs with CPAP; O2 provided by [facility]    Current Discharge Risk  chronically ill        Discharge Plan     Row Name 12/17/18 1236       Plan    Plan  Home    Patient/Family in Agreement with Plan  yes    Plan Comments  Met with patient and wife in the room to initiate discharge planning. Patient lives with his wife and two adult sons in a home in [facility]. He is normally independent with ADLs and mobility. He wears 2L home O2 with his CPAP qhs. Patient's goal is home at discharge, and family will provide his ride. He does not anticipate any discharge needs at this time. CM will continue to follow.     Final Discharge Disposition Code  01 - home or self-care        Destination      No service coordination in this encounter.      Durable Medical Equipment      No service coordination in this encounter.      Dialysis/Infusion      No service  coordination in this encounter.      Home Medical Care      No service coordination in this encounter.      Community Resources      No service coordination in this encounter.        Expected Discharge Date and Time     Expected Discharge Date Expected Discharge Time    Dec 19, 2018         Demographic Summary     Row Name 12/17/18 1234       General Information    Admission Type  inpatient    Referral Source  admission list    Reason for Consult  discharge planning    General Information Comments  PCP is Jeane Baird       Contact Information    Permission Granted to Share Info With  ;family/designee wife, Joanne Craven        Functional Status     Row Name 12/17/18 1234       Functional Status    Usual Activity Tolerance  good       Functional Status, IADL    Medications  independent    Meal Preparation  independent    Housekeeping  independent    Laundry  independent    Shopping  independent       Employment/    Employment/ Comments  Confirmed with patient that he has medical and rx coverage through [employer] and is able to afford his medications.         Psychosocial    No documentation.       Abuse/Neglect    No documentation.       Legal    No documentation.       Substance Abuse    No documentation.       Patient Forms    No documentation.           Edie Melgar

## 2018-12-17 NOTE — CONSULTS
REFERRING PHYSICIAN: No ref. provider found    PRIMARY CARE PROVIDER: Jeane Baird MD    REASON FOR CONSULTATION: Splenomegaly      HISTORY OF PRESENT ILLNESS:  68-year-old gentleman with splenomegaly presents with two-week history of shortness of breath with lower extremity edema.  CAT scan of his abdomen revealed a large spleen measuring 20 cm in size.  He is followed by Dr. Ramirez in the past.  Denies any infections.  No significant night sweats.  No weight loss.      No Known Allergies    Past Medical History:   Diagnosis Date   • A-fib (CMS/[facility])    • Anemia    • Arthritis    • Belching    • CHF (congestive heart failure) (CMS/[facility])    • Cholelithiasis    • COPD (chronic obstructive pulmonary disease) (CMS/[facility]) 10/26/2016   • Coronary artery disease    • Cough     PRODUCTIVE   • Elevated bilirubin    • Elevated LFTs    • Gastritis     H/ pylori gastritis   • H/O echocardiogram 12/03/2015    Study quality good. LV chamber size is midly dilated. Est EF 50-55%, Left atrium mod dilated. A patent marin ovale is not demonstrated with color doppler and agitated saline contrast, aortic valve leaflets mildly thickened, trace of aorta reg., mod mitral reg., mild tricuspid reg., RVSP 40mmHg, Trivial pericardial effusion visualized   • History of bleeding ulcers     2003   • History of transfusion    • Pneumonia     [id number]   • Reactive airway disease    • Shortness of breath          Current Facility-Administered Medications:   •  budesonide-formoterol (SYMBICORT) 80-4.5 MCG/ACT inhaler 2 puff, 2 puff, Inhalation, BID - RT, Oxana Mayfield, APRN  •  metoprolol tartrate (LOPRESSOR) half tablet 12.5 mg, 12.5 mg, Oral, Q12H, Oxana Mayfield, APRN, 12.5 mg at 12/17/18 0850  •  polyethylene glycol 3350 powder (packet), 17 g, Oral, Daily PRN, Oxana Mayfield, APRN  •  sodium chloride 0.9 % flush 10 mL, 10 mL, Intravenous, PRN, Regan Marion MD  •  sodium chloride 0.9 % flush 3 mL, 3 mL, Intravenous, Q12H, Oxana Mayfield, APRN, 3  mL at 12/16/18 0811  •  sodium chloride 0.9 % flush 3-10 mL, 3-10 mL, Intravenous, PRN, [place], Oxana, APRN    Past Surgical History:   Procedure Laterality Date   • CARDIAC CATHETERIZATION  06/17/2016    PER DR. GRAY   • CATARACT EXTRACTION Right 2016   • CHOLECYSTECTOMY  08/2016   • COLONOSCOPY     • FRACTURE SURGERY     • JOINT REPLACEMENT     • LIVER BIOPSY  08/2018   • OTHER SURGICAL HISTORY      Cellulitis of left lug summer 2015   • REPLACEMENT TOTAL KNEE      left   • SKIN BIOPSY     • SKIN CANCER EXCISION  2015    REMOVED FROM FACE   • VEIN LIGATION AND STRIPPING WITH LASER         Social History     Socioeconomic History   • Marital status:      Spouse name: Not on file   • Number of children: Not on file   • Years of education: Not on file   • Highest education level: Not on file   Tobacco Use   • Smoking status: Never Smoker   • Smokeless tobacco: Never Used   Substance and Sexual Activity   • Alcohol use: No   • Drug use: No   • Sexual activity: Defer     Partners: Female   Social History Narrative    Lives in [place]     Still works daily       Family History   Problem Relation Age of Onset   • Hypertension Other    • Arthritis Other    • Dementia Mother    • Hypertension Mother    • Atrial fibrillation Mother    • Coronary artery disease Father    • COPD Sister    • Arthritis Sister    • Arthritis Brother    • Hypertension Brother    • Diverticulitis Brother          REVIEW OF SYSTEMS:  A 14 point review of systems was performed and is negative except as noted below.    Review of Systems   Constitutional: Positive for fatigue.   HENT:  Negative.    Eyes: Negative.    Respiratory: Positive for shortness of breath.    Cardiovascular: Negative.    Gastrointestinal: Positive for abdominal distention.   Endocrine: Negative.    Genitourinary: Negative.     Musculoskeletal: Negative.    Skin: Negative.    Neurological: Negative.    Hematological: Negative.    Psychiatric/Behavioral: Negative.           Objective     Vitals:    12/17/18 0418 12/17/18 0808 12/17/18 1143 12/17/18 1637   BP: 110/80  112/72 117/73   BP Location: Right arm  Right arm Right arm   Patient Position: Lying  Sitting Sitting   Pulse: 80  66    Resp: 18 18 18 16   Temp: 97.5 °F (36.4 °C) 98.8 °F (37.1 °C) 97.8 °F (36.6 °C) 97.8 °F (36.6 °C)   TempSrc: Oral Oral Oral Oral   SpO2: 95%  91%    Weight: 106 kg (233 lb 4.8 oz)      Height:           Temp:  [97.5 °F (36.4 °C)-98.8 °F (37.1 °C)] 97.8 °F (36.6 °C)     Performance Status: 1    Physical Exam    General: well appearing male in no acute distress  HEENT: sclera icteric, oropharynx clear  Lymphatics: no cervical, supraclavicular, or axillary adenopathy  Cardiovascular: regular rate and rhythm, no murmurs  Lungs: clear to auscultation bilaterally  Abdomen: soft, nontender, nondistended.  Palpable spleen  Extremeties: no lower extremity edema  Skin: no rashes, lesions, bruising, or petechiae      LABS:    Lab Results   Component Value Date    HGB 9.8 (L) 12/16/2018    HCT 30.6 (L) 12/16/2018    MCV 99.7 (H) 12/16/2018     12/16/2018    WBC 3.78 12/16/2018    NEUTROABS 2.74 12/15/2018    LYMPHSABS 0.90 12/15/2018    MONOSABS 0.35 12/15/2018    EOSABS 0.06 12/15/2018    BASOSABS 0.00 12/15/2018     Lab Results   Component Value Date    GLUCOSE 87 12/16/2018    BUN 19 12/16/2018    CREATININE 1.06 12/16/2018     12/16/2018    K 4.0 12/16/2018     12/16/2018    CO2 26.0 12/16/2018    CALCIUM 8.4 (L) 12/16/2018    PROTEINTOT 6.9 12/14/2018    ALBUMIN 4.40 12/14/2018    BILITOT 6.7 (H) 12/14/2018    ALKPHOS 44 12/14/2018    AST 31 12/14/2018    ALT 16 12/14/2018         IMAGING    Ct Abdomen Pelvis With & Without Contrast    Result Date: 12/17/2018  EXAMINATION: CT CHEST WO CONTRAST-, CT ABDOMEN PELVIS W WO CONTRAST- 12/16/2018  INDICATION: Pleural effusion; J44.1-Chronic obstructive pulmonary disease with (acute) exacerbation; J96.21-Acute and chronic respiratory failure  with hypoxia; E80.4-Gilbert syndrome  TECHNIQUE: CT chest without intravenous contrast administration, CT abdomen and pelvis with and without intravenous contrast administration.  The radiation dose reduction device was turned on for each scan per the ALARA (As Low as Reasonably Achievable) protocol.  COMPARISON: Ultrasound abdomen 12/14/2018  FINDINGS:  CHEST: Thyroid is homogeneous in attenuation. No bulky mediastinal adenopathy. Central airways are patent. Esophagus in normal course and caliber. Atherosclerotic nonaneurysmal thoracic aorta. Cardiac size borderline enlarged with moderate pericardial fluid along the anterior margin which has mild lobulated margins. Atherosclerotic calcifications are present. Extended lung windows demonstrate upper lobe predominant mild centrilobular emphysema. Small bilateral pleural effusions right greater than left with adjacent atelectasis. No distinct focal consolidation otherwise noted.  Degenerative changes of the spine without aggressive osseous lesion. No soft tissue body wall findings specifically no bulky axillary adenopathy.  ABDOMEN AND PELVIS: Liver without focal lesion. Gallbladder surgically absent. No intrahepatic biliary dilatation. Poor visualization of the common bile duct without gross dilatation identified. Pancreas unremarkable. Spleen is enlarged to 20 cm in craniocaudal dimension with splenule along the inferior medial margin. Adrenals without distinct nodule. Kidneys without hydronephrosis or hydroureter. No bulky retroperitoneal adenopathy. GI tract evaluation without focal thickening or disproportionate dilatation of bowel. Appendix is visualized and unremarkable. No free fluid or intra-abdominal free air. Pelvic viscera grossly unremarkable with moderately enlarged prostate demonstrating calcifications and a decompressed urinary bladder. No free pelvic fluid or bulky pelvic adenopathy. Degenerative changes of the spine without aggressive osseous or soft  tissue body wall lesions of concern. Portal veins and IVC patent. Atherosclerotic nonaneurysmal patent abdominal aorta.      1. Small bilateral pleural effusions right greater than left with adjacent atelectasis. 2. Small to moderate pericardial fluid along the right heart margin which has a slight lobulated appearance however no calcifications concerning for potential pericardial disease process. 3. Status post cholecystectomy without gross intrahepatic or extrahepatic biliary dilatation of limited visualization. If there is further concern for biliary obstructive process recommend MRCP for further evaluation. 4. Spleen is enlarged to 20 cm in craniocaudal dimension without ascites.  D:  12/17/2018 E:  12/17/2018   This report was finalized on 12/17/2018 3:06 PM by Dr. Roger Weaver.      Xr Chest 2 View    Result Date: 12/14/2018  EXAMINATION: XR CHEST 2 VW- 12/14/2018  INDICATION: dyspnea  TECHNIQUE: Two view chest.  COMPARISONS: 09/22/2017  FINDINGS: Small volume bilateral pleural effusions and adjacent opacity. Prominent cardiopericardial silhouette. Atherosclerosis aortic knob. No pneumothorax.      Small volume bilateral pleural effusions.  D:  12/14/2018 E:  12/14/2018  This report was finalized on 12/14/2018 1:30 PM by Uriel Mcneal.      Ct Chest Without Contrast    Result Date: 12/17/2018  EXAMINATION: CT CHEST WO CONTRAST-, CT ABDOMEN PELVIS W WO CONTRAST- 12/16/2018  INDICATION: Pleural effusion; J44.1-Chronic obstructive pulmonary disease with (acute) exacerbation; J96.21-Acute and chronic respiratory failure with hypoxia; E80.4-Gilbert syndrome  TECHNIQUE: CT chest without intravenous contrast administration, CT abdomen and pelvis with and without intravenous contrast administration.  The radiation dose reduction device was turned on for each scan per the ALARA (As Low as Reasonably Achievable) protocol.  COMPARISON: Ultrasound abdomen 12/14/2018  FINDINGS:  CHEST: Thyroid is homogeneous in attenuation.  No bulky mediastinal adenopathy. Central airways are patent. Esophagus in normal course and caliber. Atherosclerotic nonaneurysmal thoracic aorta. Cardiac size borderline enlarged with moderate pericardial fluid along the anterior margin which has mild lobulated margins. Atherosclerotic calcifications are present. Extended lung windows demonstrate upper lobe predominant mild centrilobular emphysema. Small bilateral pleural effusions right greater than left with adjacent atelectasis. No distinct focal consolidation otherwise noted.  Degenerative changes of the spine without aggressive osseous lesion. No soft tissue body wall findings specifically no bulky axillary adenopathy.  ABDOMEN AND PELVIS: Liver without focal lesion. Gallbladder surgically absent. No intrahepatic biliary dilatation. Poor visualization of the common bile duct without gross dilatation identified. Pancreas unremarkable. Spleen is enlarged to 20 cm in craniocaudal dimension with splenule along the inferior medial margin. Adrenals without distinct nodule. Kidneys without hydronephrosis or hydroureter. No bulky retroperitoneal adenopathy. GI tract evaluation without focal thickening or disproportionate dilatation of bowel. Appendix is visualized and unremarkable. No free fluid or intra-abdominal free air. Pelvic viscera grossly unremarkable with moderately enlarged prostate demonstrating calcifications and a decompressed urinary bladder. No free pelvic fluid or bulky pelvic adenopathy. Degenerative changes of the spine without aggressive osseous or soft tissue body wall lesions of concern. Portal veins and IVC patent. Atherosclerotic nonaneurysmal patent abdominal aorta.      1. Small bilateral pleural effusions right greater than left with adjacent atelectasis. 2. Small to moderate pericardial fluid along the right heart margin which has a slight lobulated appearance however no calcifications concerning for potential pericardial disease process. 3.  Status post cholecystectomy without gross intrahepatic or extrahepatic biliary dilatation of limited visualization. If there is further concern for biliary obstructive process recommend MRCP for further evaluation. 4. Spleen is enlarged to 20 cm in craniocaudal dimension without ascites.  D:  12/17/2018 E:  12/17/2018   This report was finalized on 12/17/2018 3:06 PM by Dr. Roger Weaver.      Us Abdomen Limited    Result Date: 12/15/2018  EXAMINATION: US ABDOMEN LIMITED - 12/15/2018  INDICATION: J44.1-Chronic obstructive pulmonary disease with (acute) exacerbation; J96.21-Acute and chronic respiratory failure with hypoxia; E80.4-Gilbert syndrome.  TECHNIQUE: Ultrasound of all 4 quadrants of the abdomen and pelvis for ascites.  COMPARISON: None.  FINDINGS: Trace free fluid is noted in both pleural spaces. No ascites is seen in the abdomen or pelvis. No abnormally dilated bowel loops are seen.      Small pleural effusions noted. No evidence of ascites.   DICTATED:   12/15/2018 EDITED/ls :   12/15/2018  This report was finalized on 12/15/2018 11:02 PM by DR. Chandra Horn MD.        ASSESSMENT/PLAN:    1.  Splenomegaly.  This is concerning for a primary bone marrow failure syndrome.  Would recommend bone marrow biopsy today.    2.  Hyperbilirubinemia.  Possibly due to intramedullary hemolysis.  We will check fractionated bilirubin.  CAT scans don't show any biliary dilatation.      Carlos Wright MD    12/17/2018      Please note that portions of this note may have been completed with a voice recognition program. Efforts were made to edit the dictations, but occasionally words are mistranscribed.

## 2018-12-18 VITALS
DIASTOLIC BLOOD PRESSURE: 71 MMHG | OXYGEN SATURATION: 94 % | SYSTOLIC BLOOD PRESSURE: 116 MMHG | RESPIRATION RATE: 16 BRPM | HEIGHT: 71 IN | TEMPERATURE: 98.3 F | HEART RATE: 72 BPM | BODY MASS INDEX: 32.58 KG/M2 | WEIGHT: 232.7 LBS

## 2018-12-18 PROCEDURE — 99239 HOSP IP/OBS DSCHRG MGMT >30: CPT | Performed by: INTERNAL MEDICINE

## 2018-12-18 PROCEDURE — 83010 ASSAY OF HAPTOGLOBIN QUANT: CPT | Performed by: INTERNAL MEDICINE

## 2018-12-18 PROCEDURE — 83921 ORGANIC ACID SINGLE QUANT: CPT | Performed by: INTERNAL MEDICINE

## 2018-12-18 RX ADMIN — METOPROLOL TARTRATE 12.5 MG: 25 TABLET, FILM COATED ORAL at 08:27

## 2018-12-18 NOTE — DISCHARGE SUMMARY
Psychiatric Medicine Services  DISCHARGE SUMMARY    Patient Name: Smith Craven  : 1950  MRN: [id number]    Date of Admission: 2018  Date of Discharge: 18  Length of Stay: 4  Primary Care Physician: Jeane Baird MD    Consults     Date and Time Order Name Status Description    2018 0846 Inpatient Hematology & Oncology Consult Completed         Hospital Course     Presenting Problem:   COPD (chronic obstructive pulmonary disease) (CMS/HCC) [J44.9]      Active Hospital Problems    Diagnosis Date Noted   • Splenomegaly [D73.2] 2018     Priority: High   • SOB (shortness of breath) [R06.02] 2016     Priority: High   • Pericardial effusion [I31.3] 2018     Priority: Medium   • Pleural effusion, bilateral [J90] 2018     Priority: Medium   • Fatty liver- No cirrhosis by BX [K76.0] 12/15/2018   • Mitral valve regurgitation, MILD MR [I34.0] 12/15/2018   • Chronic anemia [D63.8] 12/15/2018   • Gilbert's syndrome [E80.4] 2018   • COPD (chronic obstructive pulmonary disease) (CMS/HCC) [J44.9] 10/26/2016   • ANN MARIE on CPAP [G47.33, Z99.89] 10/18/2016   • Obesity (BMI 30-39.9) [E66.9] 2016   • Atrial fibrillation (CMS/HCC) [I48.91] 2016      Resolved Hospital Problems    Diagnosis Date Noted Date Resolved   • **Acute on chronic diastolic heart failure (CMS/HCC) [I50.33] 2016   • Hypokalemia [E87.6] 12/15/2018 2018          Hospital Course:  Smith Craven is a 68 y.o. male with hx of chronic hypoxic respiratory failure, COPD, ANN MARIE, DHF, mitral regurgitation, Afib on Eliquis, chronic splenomegaly, Gilbert's syndrome with chronic hyperbilirubinemia, and fatty liver (determined by biopsy summer 2018) who presented due to 2-3 day history of worsening dyspnea, lower extremity edema, and abdominal distension. ER evaluation showed elevated , negative troponins, and CXR with small bilateral pleural effusions.  Admitted to hospitalists.      Patient was diuresed with the presumption of heart failure. However his echo looked much improved.  Abd US was done to eval for ascites, but there was none, only a massive spleen.  A CT of his joi and abdomen showed a pericardial effusion, pleural effusion and large spleen.  He was seen by Dr Hankins who recommended a BM Bx which was done one day PTDC.  Patient felt better after diuresis abd was able to tolerate some time off oxygen. And felt ready to be discharged home.  His Echo is improved with normal EF and mild MR  I believe dyspnea is restrictive- due to pericardial disease and or splenomegaly as well as pleural effusions, but am not convinced that the fluid is congestive.  He felt ready to be discharged home with close outpatient care.   BM is pending-  Pericardial effusion needs to be monitored-- unknown if malignant or reactive from his splenomegaly which is thought to be secondary hematopoesis. Unfortunately  he is quite symptomatic from its size.   Day of Discharge     HPI:   Patient feels much better thought NOT well.    Patient denies headaches, fever, chills, sore throat, shortness of breath, chest pain, cough, nausea or vomiting, diarrhea, abdominal pain or distension, joint pain, rash, itching or bleeding.  Vital Signs: Temp:  [97.8 °F (36.6 °C)-98.9 °F (37.2 °C)] 98.7 °F (37.1 °C)  Heart Rate:  [51-82] 51  Resp:  [16-18] 18  BP: (100-127)/(57-84) 100/84     Physical Exam:  Patient is alert and talkative in no distress at rest  Neck is without mass or JVD  Heart is Reg wo murmur OR RUB  Lungs are clear wo wheeze or crackle  Abd is soft without HSM or mass, not tender or distended  MAEW  Skin is without rash no edema  Neurologic exam in nonfocal   Mood is appropriate      Pertinent  and/or Most Recent Results       Results from last 7 days   Lab Units  12/16/18   0517  12/15/18   1544  12/15/18   0523  12/14/18   1225   WBC 10*3/mm3  3.78   --   4.05  8.26   HEMOGLOBIN g/dL   9.8*   --   9.9*  11.3*   HEMATOCRIT %  30.6*   --   30.6*  35.0*   PLATELETS 10*3/mm3  166   --   154  224   SODIUM mmol/L  141   --   143  138   POTASSIUM mmol/L  4.0  4.2  3.4*  3.8   CHLORIDE mmol/L  109   --   109  107   CO2 mmol/L  26.0   --   27.0  22.0   BUN mg/dL  19   --   17  17   CREATININE mg/dL  1.06   --   1.01  1.11   GLUCOSE mg/dL  87   --   97  120*   CALCIUM mg/dL  8.4*   --   8.6*  9.1     Results from last 7 days   Lab Units  12/17/18   0858  12/14/18   1909  12/14/18   1225   BILIRUBIN mg/dL  8.6*   --   6.7*   ALK PHOS U/L  42   --   44   ALT (SGPT) U/L  13   --   16   AST (SGOT) U/L  26   --   31   PROTIME Seconds   --   12.0*   --    INR    --   1.14*   --    APTT seconds   --   28.9   --           Invalid input(s): TG  Results from last 7 days   Lab Units  12/14/18   1259  12/14/18   1225   BNP pg/mL  421.0*   --    TROPONIN I ng/mL   --   0.030     Brief Urine Lab Results     None               Ct Abdomen Pelvis With & Without Contrast    Result Date: 12/17/2018  Impression: 1. Small bilateral pleural effusions right greater than left with adjacent atelectasis. 2. Small to moderate pericardial fluid along the right heart margin which has a slight lobulated appearance however no calcifications concerning for potential pericardial disease process. 3. Status post cholecystectomy without gross intrahepatic or extrahepatic biliary dilatation of limited visualization. If there is further concern for biliary obstructive process recommend MRCP for further evaluation. 4. Spleen is enlarged to 20 cm in craniocaudal dimension without ascites.  D:  12/17/2018 E:  12/17/2018   This report was finalized on 12/17/2018 3:06 PM by Dr. Roger Weaver.      Xr Chest 2 View    Result Date: 12/14/2018  Impression: Small volume bilateral pleural effusions.  D:  12/14/2018 E:  12/14/2018  This report was finalized on 12/14/2018 1:30 PM by Uriel Mcneal.      Ct Chest Without Contrast    Result Date:  12/17/2018  Impression: 1. Small bilateral pleural effusions right greater than left with adjacent atelectasis. 2. Small to moderate pericardial fluid along the right heart margin which has a slight lobulated appearance however no calcifications concerning for potential pericardial disease process. 3. Status post cholecystectomy without gross intrahepatic or extrahepatic biliary dilatation of limited visualization. If there is further concern for biliary obstructive process recommend MRCP for further evaluation. 4. Spleen is enlarged to 20 cm in craniocaudal dimension without ascites.  D:  12/17/2018 E:  12/17/2018   This report was finalized on 12/17/2018 3:06 PM by Dr. Roger Weaver.      Us Abdomen Limited    Result Date: 12/15/2018  Impression: Small pleural effusions noted. No evidence of ascites.   DICTATED:   12/15/2018 EDITED/ls :   12/15/2018  This report was finalized on 12/15/2018 11:02 PM by DR. Chandra Horn MD.        Results for orders placed during the hospital encounter of 12/14/18   Adult Transthoracic Echo Complete W/ Cont if Necessary Per Protocol    Narrative · Bi-atrial and right ventricular enlargement is present.  · The LV ejection fraction is normal; mild concentric LVH is seen.  · There is aortic valve sclerosis with trace aortic insufficiency.  · There is mitral annular calcification with mild mitral regurgitation.  · The estimated PA pressure is mildly elevated.  · There is a trivial pericardial effusion.           Order Current Status    Bone Marrow Exam In process    Bone Marrow Exam In process    Haptoglobin In process    Methylmalonic Acid, Serum In process        Discharge Details        Discharge Medications      Continue These Medications      Instructions Start Date   apixaban 5 MG tablet tablet  Commonly known as:  ELIQUIS   5 mg, Oral, 2 Times Daily      cholecalciferol 1000 units tablet  Commonly known as:  VITAMIN D3   5,000 Units, Oral, Every Morning      Fluticasone  Furoate-Vilanterol 200-25 MCG/INH aerosol powder  inhaler  Commonly known as:  BREO ELLIPTA   1 puff, Inhalation, Daily      folic acid 1 MG tablet  Commonly known as:  FOLVITE   1 mg, Oral, Every Morning      furosemide 40 MG tablet  Commonly known as:  LASIX   40 mg, Oral, Every Morning      metoprolol tartrate 25 MG tablet  Commonly known as:  LOPRESSOR   12.5 mg, Oral, 2 Times Daily      predniSONE 5 MG tablet  Commonly known as:  DELTASONE   5 mg, Oral, 2 Times Daily      vitamin B-12 1000 MCG tablet  Commonly known as:  CYANOCOBALAMIN   2,500 mcg, Oral, Daily               Discharge Disposition:  Home or Self Care    Discharge Diet:  regular    Discharge Activity:  as tolerated WITH oxygen    Special Instructions:    Future Appointments   Date Time Provider Department Center   1/7/2019 10:45 AM RAD TECH [facility] None   1/7/2019 11:00 AM [facility], P[facility] LAB 1 [facility] None   1/7/2019 11:30 AM Myrna Thorne APRN [facility] None   7/30/2019  1:15 PM Brant Napier MD [facility] None       Additional Instructions for the Follow-ups that You Need to Schedule     Discharge Follow-up with PCP   As directed       Currently Documented PCP:    Jeane Baird MD    PCP Phone Number:    [phone]     Follow Up Details:  in one week             PAtient to see Dr Arteaga as well.  PENDING BM BX results  Time Spent on Discharge:  60 minutes    Electronically signed by Marni Schmidt MD 12/18/18 11:05 AM

## 2018-12-19 ENCOUNTER — READMISSION MANAGEMENT (OUTPATIENT)
Dept: CALL CENTER | Facility: HOSPITAL | Age: 68
End: 2018-12-19

## 2018-12-19 LAB — HAPTOGLOB SERPL-MCNC: <10 MG/DL (ref 34–200)

## 2018-12-19 NOTE — OUTREACH NOTE
Prep Survey      Responses   Facility patient discharged from?  [facility]   Is patient eligible?  Yes   Discharge diagnosis  Acute on chronic diastolic heart failure, abdominal distention, pleural effusion, bilateral, hyperbilirubinemia, splenomegaly, congestive, chronic, Gilbert's syndrome, heart failure, COPD, ANN MARIE on CPAP, Obesity, Afib,   Does the patient have one of the following disease processes/diagnoses(primary or secondary)?  Other   Does the patient have Home health ordered?  No   Is there a DME ordered?  No   Comments regarding appointments  See AVS   Prep survey completed?  Yes          Radha Leos RN

## 2018-12-20 ENCOUNTER — READMISSION MANAGEMENT (OUTPATIENT)
Dept: CALL CENTER | Facility: HOSPITAL | Age: 68
End: 2018-12-20

## 2018-12-20 NOTE — OUTREACH NOTE
Medical Week 1 Survey      Responses   Facility patient discharged from?  [facility]   Does the patient have one of the following disease processes/diagnoses(primary or secondary)?  Other   Is there a successful TCM telephone encounter documented?  No   Week 1 attempt successful?  No   Unsuccessful attempts  Attempt 1          Jeane Dorsey RN

## 2018-12-21 ENCOUNTER — READMISSION MANAGEMENT (OUTPATIENT)
Dept: CALL CENTER | Facility: HOSPITAL | Age: 68
End: 2018-12-21

## 2018-12-21 NOTE — OUTREACH NOTE
Medical Week 1 Survey      Responses   Facility patient discharged from?  [facility]   Does the patient have one of the following disease processes/diagnoses(primary or secondary)?  Other   Is there a successful TCM telephone encounter documented?  No   Week 1 attempt successful?  No   Unsuccessful attempts  Attempt 2          Michelle Brown RN

## 2018-12-21 NOTE — OUTREACH NOTE
Medical Week 1 Survey      Responses   Facility patient discharged from?  [facility]   Does the patient have one of the following disease processes/diagnoses(primary or secondary)?  Other   Is there a successful TCM telephone encounter documented?  No   Week 1 attempt successful?  Yes   Call start time  1556   Call end time  1600   Discharge diagnosis  Acute on chronic diastolic heart failure, abdominal distention, pleural effusion, bilateral, hyperbilirubinemia, splenomegaly, congestive, chronic, Gilbert's syndrome, heart failure, COPD, ANN MARIE on CPAP, Obesity, Afib,   Is patient permission given to speak with other caregiver?  Yes   Person spoke with today (if not patient) and relationship  Joanne Spouse   Meds reviewed with patient/caregiver?  Yes   Is the patient having any side effects they believe may be caused by any medication additions or changes?  No   Does the patient have all medications ordered at discharge?  N/A   Is the patient taking all medications as directed (includes completed medication regime)?  Yes   Does the patient have a primary care provider?   Yes   Does the patient have an appointment with their PCP within 7 days of discharge?  Yes   Has the patient kept scheduled appointments due by today?  Yes   Has home health visited the patient within 72 hours of discharge?  N/A   Psychosocial issues?  No   Did the patient receive a copy of their discharge instructions?  Yes   Nursing interventions  Reviewed instructions with patient   What is the patient's perception of their health status since discharge?  Improving   Is the patient/caregiver able to teach back signs and symptoms related to disease process for when to call PCP?  Yes   Is the patient/caregiver able to teach back signs and symptoms related to disease process for when to call 911?  Yes   Is the patient/caregiver able to teach back the hierarchy of who to call/visit for symptoms/problems? PCP, Specialist, Home health nurse, Urgent Care, ED,  911  Yes   Additional teach back comments  He is not struggling to breathe right now.   Week 1 call completed?  Yes          Karen Cuellar RN

## 2018-12-24 LAB
Lab: NORMAL
METHYLMALONATE SERPL-SCNC: 122 NMOL/L (ref 0–378)

## 2018-12-29 ENCOUNTER — READMISSION MANAGEMENT (OUTPATIENT)
Dept: CALL CENTER | Facility: HOSPITAL | Age: 68
End: 2018-12-29

## 2018-12-29 NOTE — OUTREACH NOTE
Medical Week 2 Survey      Responses   Facility patient discharged from?  [facility]   Does the patient have one of the following disease processes/diagnoses(primary or secondary)?  Other   Week 2 attempt successful?  No   Unsuccessful attempts  Attempt 1          Rush Ramos RN

## 2019-01-07 ENCOUNTER — OFFICE VISIT (OUTPATIENT)
Dept: PULMONOLOGY | Facility: CLINIC | Age: 69
End: 2019-01-07

## 2019-01-07 VITALS
RESPIRATION RATE: 18 BRPM | SYSTOLIC BLOOD PRESSURE: 98 MMHG | WEIGHT: 248 LBS | HEART RATE: 67 BPM | BODY MASS INDEX: 34.72 KG/M2 | TEMPERATURE: 98.7 F | HEIGHT: 71 IN | OXYGEN SATURATION: 92 % | DIASTOLIC BLOOD PRESSURE: 58 MMHG

## 2019-01-07 DIAGNOSIS — R06.02 SOB (SHORTNESS OF BREATH): Primary | ICD-10-CM

## 2019-01-07 DIAGNOSIS — G47.33 OSA ON CPAP: ICD-10-CM

## 2019-01-07 DIAGNOSIS — J44.9 CHRONIC OBSTRUCTIVE PULMONARY DISEASE, UNSPECIFIED COPD TYPE (HCC): ICD-10-CM

## 2019-01-07 DIAGNOSIS — Z99.89 OSA ON CPAP: ICD-10-CM

## 2019-01-07 PROCEDURE — 94726 PLETHYSMOGRAPHY LUNG VOLUMES: CPT | Performed by: NURSE PRACTITIONER

## 2019-01-07 PROCEDURE — 94729 DIFFUSING CAPACITY: CPT | Performed by: NURSE PRACTITIONER

## 2019-01-07 PROCEDURE — 94060 EVALUATION OF WHEEZING: CPT | Performed by: NURSE PRACTITIONER

## 2019-01-07 PROCEDURE — 99214 OFFICE O/P EST MOD 30 MIN: CPT | Performed by: NURSE PRACTITIONER

## 2019-01-07 RX ORDER — ALBUTEROL SULFATE 90 UG/1
4 AEROSOL, METERED RESPIRATORY (INHALATION) ONCE
Status: COMPLETED | OUTPATIENT
Start: 2019-01-07 | End: 2019-01-07

## 2019-01-07 RX ORDER — SULFAMETHOXAZOLE AND TRIMETHOPRIM 800; 160 MG/1; MG/1
TABLET ORAL
Refills: 0 | COMMUNITY
Start: 2019-01-04 | End: 2019-01-22 | Stop reason: HOSPADM

## 2019-01-07 RX ORDER — POTASSIUM CHLORIDE 20 MEQ/1
TABLET, EXTENDED RELEASE ORAL
Refills: 5 | COMMUNITY
Start: 2018-12-26 | End: 2019-01-22 | Stop reason: HOSPADM

## 2019-01-07 RX ADMIN — ALBUTEROL SULFATE 4 PUFF: 90 AEROSOL, METERED RESPIRATORY (INHALATION) at 11:28

## 2019-01-07 NOTE — PROGRESS NOTES
[facility] Pulmonary Follow up    CHIEF COMPLAINT    COPD/obstructive sleep apnea    HISTORY OF PRESENT ILLNESS    Smith Craven is a 68 y.o.male here today for follow-up of mild COPD and obstructive sleep apnea.  He was last seen in our office in October for dyspnea.  He was hospitalized at [facility] in December for a CHF exacerbation.  He was given IV Lasix and diuresed adequately.  He is to follow-up with cardiology in February.  At his last appointment he was placed on 2 L of oxygen with activity due to the results of the 6 minute walk test.  He has been wearing this as needed and with activity and states that his breathing has somewhat improved.  He denies any recent respiratory illnesses.  He has cellulitis on his left lower extremity and has been treated with antibiotics for this for some time per his primary care physician.    He continues to use Breo daily for his COPD.  He occasionally uses his rescue inhaler.  He also has albuterol nebulizers that he uses at least once a day.  He has been wearing his CPAP every night but states that the mask and machine is old and he would like a new CPAP machine if he qualifies for this.  At his last appointment I had ordered him a POC and this has not been delivered to him yet.  He states that there was no order sent for this.    He denies fever, chills, sputum production, hemoptysis, weight loss, chest pain or palpitations.  He has mild left extremity edema from his current cellulitis.  He denies reflux or allergy symptoms.    He had a liver biopsy completed since his last visit and states that this was normal.  He is following with a liver specialist every 3 months for close observation of his liver function.    Patient Active Problem List   Diagnosis   • Non-obstructive coronary artery disease   • Atrial fibrillation (CMS/HCC)   • Neurocardiogenic syncope   • SOB (shortness of breath)   • Calculus of gallbladder with biliary obstruction but without  cholecystitis   • Bronchitis   • Obesity (BMI 30-39.9)   • Iron deficiency anemia   • Reactive airway disease   • ANN MARIE on CPAP   • Peripheral venous insufficiency   • COPD (chronic obstructive pulmonary disease) (CMS/HCC)   • Mitral valve regurgitation   • Gilbert's syndrome   • Splenomegaly   • Pleural effusion, bilateral   • Fatty liver- No cirrhosis by BX   • Mitral valve regurgitation, MILD MR   • Chronic anemia   • Pericardial effusion       No Known Allergies    Current Outpatient Medications:   •  apixaban (ELIQUIS) 5 MG tablet tablet, Take 5 mg by mouth 2 (two) times a day., Disp: , Rfl:   •  cholecalciferol (VITAMIN D3) 1000 UNITS tablet, Take 5,000 Units by mouth every morning., Disp: , Rfl:   •  Fluticasone Furoate-Vilanterol (BREO ELLIPTA) 200-25 MCG/INH aerosol powder , Inhale 1 puff Daily., Disp: 2 each, Rfl: 0  •  folic acid (FOLVITE) 1 MG tablet, Take 1 mg by mouth every morning., Disp: , Rfl:   •  furosemide (LASIX) 40 MG tablet, Take 40 mg by mouth every morning., Disp: , Rfl:   •  metoprolol tartrate (LOPRESSOR) 25 MG tablet, Take 12.5 mg by mouth 2 (two) times a day., Disp: , Rfl:   •  potassium chloride (K-DUR,KLOR-CON) 20 MEQ CR tablet, TAKE ONE TABLET BY MOUTH EVERY DAY FOR POTASSIUM, Disp: , Rfl: 5  •  sulfamethoxazole-trimethoprim (BACTRIM DS,SEPTRA DS) 800-160 MG per tablet, TAKE TWO TABLETS BY MOUTH TWO TIMES A DAY FOR THE FIRST DOSES, THEN 1 TABLET TWO TIMES A DAY, Disp: , Rfl: 0  •  vitamin B-12 (CYANOCOBALAMIN) 1000 MCG tablet, Take 2,500 mcg by mouth daily., Disp: , Rfl:     Current Facility-Administered Medications:   •  albuterol (PROVENTIL HFA;VENTOLIN HFA) inhaler 2 puff, 2 puff, Inhalation, Q4H PRN, Yossi Blake MD  MEDICATION LIST AND ALLERGIES REVIEWED.    Social History     Tobacco Use   • Smoking status: Never Smoker   • Smokeless tobacco: Never Used   Substance Use Topics   • Alcohol use: No   • Drug use: No       FAMILY AND SOCIAL HISTORY REVIEWED.    Review of  "Systems   Constitutional: Negative for activity change, appetite change, fatigue, fever and unexpected weight change.   HENT: Negative for congestion, postnasal drip, rhinorrhea, sinus pressure, sore throat and voice change.    Eyes: Negative for visual disturbance.   Respiratory: Positive for cough, shortness of breath and wheezing. Negative for chest tightness.    Cardiovascular: Negative for chest pain, palpitations and leg swelling.   Gastrointestinal: Negative for abdominal distention, abdominal pain, nausea and vomiting.   Endocrine: Negative for cold intolerance and heat intolerance.   Genitourinary: Negative for difficulty urinating and urgency.   Musculoskeletal: Negative for arthralgias, back pain and neck pain.   Skin: Negative for color change and pallor.   Allergic/Immunologic: Negative for environmental allergies and food allergies.   Neurological: Negative for dizziness, syncope, weakness and light-headedness.   Hematological: Negative for adenopathy. Does not bruise/bleed easily.   Psychiatric/Behavioral: Negative for agitation and behavioral problems.   .    BP 98/58 (BP Location: Right arm, Patient Position: Sitting, Cuff Size: Adult)   Pulse 67   Temp 98.7 °F (37.1 °C)   Resp 18   Ht 180.3 cm (70.98\")   Wt 112 kg (248 lb)   SpO2 92% Comment: room air at rest  BMI 34.61 kg/m²       There is no immunization history on file for this patient.    Physical Exam   Constitutional: He is oriented to person, place, and time. He appears well-developed and well-nourished.   HENT:   Head: Normocephalic and atraumatic.   Eyes: Pupils are equal, round, and reactive to light.   Neck: Normal range of motion. Neck supple. No thyromegaly present.   Cardiovascular: Normal rate, regular rhythm, normal heart sounds and intact distal pulses. Exam reveals no gallop and no friction rub.   No murmur heard.  Pulmonary/Chest: Effort normal. No respiratory distress. He has wheezes. He has no rales. He exhibits no " tenderness.   Wheezes in left lower lobe   Abdominal: Soft. Bowel sounds are normal. There is no tenderness.   Musculoskeletal: Normal range of motion.   Lymphadenopathy:     He has no cervical adenopathy.   Neurological: He is alert and oriented to person, place, and time.   Skin: Skin is warm and dry. Capillary refill takes less than 2 seconds. He is not diaphoretic.   Psychiatric: He has a normal mood and affect. His behavior is normal.   Nursing note and vitals reviewed.        RESULTS    PFTS in the office today, read by me:  FVC 2.665% predicted, FEV1 1.76 60 percent predicted, FEV1/FVC 67% predicted, mild obstruction noted with no postbronchodilator response, TLC 4. 5/7/77 percent predicted, mild restriction noted, DLCO at 47% predicted.    Chest PA/lateral:  Reviewed by me in our office today, appears to have no acute pulmonary process.    PROBLEM LIST    Problem List Items Addressed This Visit        Respiratory    SOB (shortness of breath) - Primary    Relevant Medications    albuterol sulfate HFA (PROVENTIL HFA;VENTOLIN HFA;PROAIR HFA) inhaler 4 puff (Completed)    Other Relevant Orders    XR Chest PA & Lateral    Pulmonary Function Test (Completed)    ANN MARIE on CPAP    Overview     CPAP compliant         COPD (chronic obstructive pulmonary disease) (CMS/[facility])    Overview     Mild to moderate obstruction on Oct 2016 PFTs                 DISCUSSION  Mr. Craven was here for follow-up of his mild COPD.  He continues to use Breo daily for his COPD.  I gave him some samples today in our office.  He will continue to use his albuterol inhaler and nebulizers as needed for shortness of breath.  Based on his PFTs I suspect that most for shortness of breath is related to his CHF.  He does have a slight restriction in his PFTs.  He follows with cardiology in February for his CHF.    He will continue to wear oxygen at 2 L as needed with activity.  He will continue to wear his CPAP every night for obstructive sleep apnea.   I will send him a new order for a CPAP machine and supplies, as his machine is over 5 years old.  I will check with his current DME company [employer] about his POC and why he has not received this yet.  He cannot carry the tanks due to the weight and needs a portable oxygen concentrator for activity.  If they cannot give him one then I will place an order with another company.      He will follow with his liver specialist every 3 months.    He will follow-up in 6 months or sooner if his symptoms worsen.  I spent 25 minutes with the patient and family. I spent > 50% percent of this time counseling and discussing diagnosis, prognosis, diagnostic testing, evaluation, current status, treatment options and management.    Myrna Thorne, APRN  01/07/201912:37 PM  Electronically signed     Please note that portions of this note were completed with a voice recognition program. Efforts were made to edit the dictations, but occasionally words are mistranscribed.      CC: Jeane Baird MD

## 2019-01-10 ENCOUNTER — OFFICE VISIT (OUTPATIENT)
Dept: CARDIOLOGY | Facility: CLINIC | Age: 69
End: 2019-01-10

## 2019-01-10 VITALS
HEART RATE: 74 BPM | BODY MASS INDEX: 35.79 KG/M2 | SYSTOLIC BLOOD PRESSURE: 109 MMHG | WEIGHT: 250 LBS | DIASTOLIC BLOOD PRESSURE: 60 MMHG | HEIGHT: 70 IN

## 2019-01-10 DIAGNOSIS — I34.0 MITRAL VALVE INSUFFICIENCY, UNSPECIFIED ETIOLOGY: ICD-10-CM

## 2019-01-10 DIAGNOSIS — I48.0 PAROXYSMAL ATRIAL FIBRILLATION (HCC): Primary | ICD-10-CM

## 2019-01-10 DIAGNOSIS — R06.02 SOB (SHORTNESS OF BREATH): ICD-10-CM

## 2019-01-10 DIAGNOSIS — I25.10 CORONARY ARTERY DISEASE INVOLVING NATIVE CORONARY ARTERY OF NATIVE HEART, ANGINA PRESENCE UNSPECIFIED: ICD-10-CM

## 2019-01-10 PROCEDURE — 99214 OFFICE O/P EST MOD 30 MIN: CPT | Performed by: INTERNAL MEDICINE

## 2019-01-10 NOTE — PROGRESS NOTES
"  OFFICE FOLLOW UP     Date of Encounter:01/10/2019     Name: Smith Craven  : 1950  Address: [address] [place] [address]  Home Phone: [phone]  PCP: Jeane Baird MD  [address] [place] [address] [place] [address]    Smith Craven is a 68 y.o. male.      Chief Complaint: Hospital follow up - CHF, afib, shortness of breath    Problem List:   1. Cardiac disease, multifactorial:                          A. Non-obstructive CAD by cath, 2008.                          B. Atrial fibrillation. CV = 2, on Eliquis                          C. Normal LV function.                          D. \"moderate to severe\" mitral regurgitation by transesophageal echocardiogram 2017  E. \"moderate to severe\" mitral regurgitation by TTE 2017, unchanged from previous study   F. TTE, 12/15/18: biatrial enlargement, normal LVEF, mild MR, trivial pericardial effusion, mild PA hypertension.              2.  Obesity.              3.  Neurocardiogenic syncope.              4.  Cholelithiasis status post cholecystectomy, summer 2016:                          A. Procedure complicated by pneumonitis and need for blood transfusions.               5.  COPD and ANN MARIE, CPAP compliant:                          A. Followed by Yossi Blake MD.              6.  Chronic lower extremity venous insufficiency.              7. Chronic leukopenia and anemia              8.  Gilbert's syndrome               9.  \"Fatty liver with no cirrhosis\" diagnosed by liver biopsy, Summer 2018                10.  Hospitalization, 2017: shortness of breath                                A.  Bilateral pleural effusions/pericardial effusion noted.                            B.  RUU=[id number], negative troponins                          C.  \"MASSIVE\" spleenomegaly with no important ascites                          D.   Recent O2 for desaturation.        Allergies:  No Known Allergies    Current Medications:  •  apixaban 5 mg by mouth 2 (two) times a " "day.,  •  cholecalciferol 5,000 Units by mouth every morning.  •  Fluticasone Furoate-Vilanterol (BREO ELLIPTA) 200-25 MCG/INH aerosol powder , Inhale 1 puff Daily  •  folic acid 1 mg by mouth every morning  •  furosemide 40 mg by mouth Daily As Needed (1 daily extra pill as needed)  •  metoprolol tartrate (LOPRESSOR) 25 MG tablet, Take 12.5 mg by mouth 2 (two) times a day  •  O2 (OXYGEN), Inhale 2 L/min Daily.   •  potassium chloride 20 MEQ CR tablet, TAKE TWO TABLET BY MOUTH EVERY DAY   •  sulfamethoxazole-trimethoprim (BACTRIM DS,SEPTRA DS) 800-160 MG per tablet, TAKE TWO TABLETS BY MOUTH TWO TIMES A DAY  •  vitamin B-12 2,500 mcg by mouth daily.    History of Present Illness:  The patient returns for follow-up today stating that \"I'm at the end of my rope.\" He has seen multiple physicians in more than one city. He now presents with a primary complaint of progressively severe shortness of air with mild arterial oxygen desaturation that necessitated the institution of low flow nasal oxygen as an outpatient just a few weeks ago.  He does not feel better and asked me to review data. He does not have angina or symptoms of peripheral vascular or neurovascular disease.          The following portions of the patient's history were reviewed and updated as appropriate: allergies, current medications and problem list.    ROS: Pertinent positives as listed in the HPI.  All other systems reviewed and negative.    Objective:    Vitals:    01/10/19 1149 01/10/19 1151   BP: 126/71 109/60   BP Location: Left arm Left arm   Patient Position: Sitting Standing   Pulse: 71 74   Weight: 113 kg (250 lb) 113 kg (250 lb)   Height: 177.8 cm (70\") 177.8 cm (70\")       Physical Exam:  GENERAL: Alert, cooperative, in no acute distress.   HEENT: Normocephalic, no adenopathy, no jugular venous distention.  HEART: No discrete PMI is noted. Regular rhythm, normal rate, and no murmurs, gallops, or rubs.   LUNGS: Clear to auscultation " bilaterally. No wheezing, rales or ronchi.  ABDOMEN: Massively distended. No obvious ascites but the examination is limited, apparantly due to his massive spleenomegaly.  NEUROLOGIC: No focal abnormalities involving strength or sensation are noted.   EXTREMITIES: No clubbing, cyanosis, or edema noted.     Diagnostic Data:    Lab Results   Component Value Date    WBC 3.78 12/16/2018    HGB 9.8 (L) 12/16/2018    HCT 30.6 (L) 12/16/2018    MCV 99.7 (H) 12/16/2018     12/16/2018     Lab Results   Component Value Date    GLUCOSE 87 12/16/2018    BUN 19 12/16/2018    CREATININE 1.06 12/16/2018    EGFRIFNONA 69 12/16/2018    BCR 17.9 12/16/2018    K 4.0 12/16/2018    CO2 26.0 12/16/2018    CALCIUM 8.4 (L) 12/16/2018    ALBUMIN 3.97 12/17/2018    AST 26 12/17/2018    ALT 13 12/17/2018        BNP                        421                                                                 12/14/2018    Lab Results   Component Value Date    WBC 3.78 12/16/2018    HGB 9.8 (L) 12/16/2018    HCT 30.6 (L) 12/16/2018    MCV 99.7 (H) 12/16/2018     12/16/2018       Procedures      Assessment and Plan:     #1: Progressively severe SOA with O2 desaturation: The formulation is confounding due to a multiplicity of outside records/studies/opinions. At this time,  - To me (pulmonary disagrees), his SOA is more likely due to massive splenomegaly with pulmonary restriction (as is suggested by recent PFTs) than     heart failure, COPD, PA hypertension, or hepatopulmonary syndrome, etc. Recent ultrasounds have shown NO significant ascites that could be contributory.     He needs a diagnosis (from a cardiac standpoint) and this can be easily accomplished with a simple right heart catheterization that I have planned for tomorrow.    #2: Following #1, assuming that my hypothesis is correct, the etiology of his splenomegaly, with attendant leukopenia, anemia  IN THE ABSENCE OF CIRRHOSIS OR PORTAL HYPERTENSION (???) needs to be further  evaluated with a multidisciplinary team. Doubt that this is a reflection of Gilbert's disease but will defer to by hepatology colleagues in this regard.    I, Brant Napier MD, personally performed the services described as documented by the above named individual. I have made any necessary edits and it is both accurate and complete 1/10/2019  6:37 PM      Scribed for Brant Napier MD by Jennifer Hammonds RN. 01/10/2019 11:46 AM.        EMR Dragon/Transcription Disclaimer:  Much of this encounter note is an electronic transcription/translation of spoken language to printed text.  The electronic translation of spoken language may permit erroneous, or at times, nonsensical words or phrases to be inadvertently transcribed.  Although I have reviewed the note for such errors, some may still exist.

## 2019-01-10 NOTE — H&P (VIEW-ONLY)
"  OFFICE FOLLOW UP     Date of Encounter:01/10/2019     Name: Smith Craven  : 1950  Address: [address] [facility] [place] [address]  Home Phone: [phone]  PCP: Jeane Baird MD  [address] [place] [address] [place] [address]    Smith Craven is a 68 y.o. male.      Chief Complaint: Hospital follow up - CHF, afib, shortness of breath    Problem List:   1. Cardiac disease, multifactorial:                          A. Non-obstructive CAD by cath, 2008.                          B. Atrial fibrillation. CV = 2, on Eliquis                          C. Normal LV function.                          D. \"moderate to severe\" mitral regurgitation by transesophageal echocardiogram 2017  E. \"moderate to severe\" mitral regurgitation by TTE 2017, unchanged from previous study   F. TTE, 12/15/18: biatrial enlargement, normal LVEF, mild MR, trivial pericardial effusion, mild PA hypertension.              2.  Obesity.              3.  Neurocardiogenic syncope.              4.  Cholelithiasis status post cholecystectomy, summer 2016:                          A. Procedure complicated by pneumonitis and need for blood transfusions.               5.  COPD and ANN MARIE, CPAP compliant:                          A. Followed by Yossi Blake MD.              6.  Chronic lower extremity venous insufficiency.              7. Chronic leukopenia and anemia              8.  Gilbert's syndrome               9.  \"Fatty liver with no cirrhosis\" diagnosed by liver biopsy, Summer 2018                10.  Hospitalization, 2017: shortness of breath                                A.  Bilateral pleural effusions/pericardial effusion noted.                            B.  CPO=641, negative troponins                          C.  \"MASSIVE\" spleenomegaly with no important ascites                          D.   Recent O2 for desaturation.        Allergies:  No Known Allergies    Current Medications:  •  apixaban 5 mg by mouth 2 (two) times a " "day.,  •  cholecalciferol 5,000 Units by mouth every morning.  •  Fluticasone Furoate-Vilanterol (BREO ELLIPTA) 200-25 MCG/INH aerosol powder , Inhale 1 puff Daily  •  folic acid 1 mg by mouth every morning  •  furosemide 40 mg by mouth Daily As Needed (1 daily extra pill as needed)  •  metoprolol tartrate (LOPRESSOR) 25 MG tablet, Take 12.5 mg by mouth 2 (two) times a day  •  O2 (OXYGEN), Inhale 2 L/min Daily.   •  potassium chloride 20 MEQ CR tablet, TAKE TWO TABLET BY MOUTH EVERY DAY   •  sulfamethoxazole-trimethoprim (BACTRIM DS,SEPTRA DS) 800-160 MG per tablet, TAKE TWO TABLETS BY MOUTH TWO TIMES A DAY  •  vitamin B-12 2,500 mcg by mouth daily.    History of Present Illness:  The patient returns for follow-up today stating that \"I'm at the end of my rope.\" He has seen multiple physicians in more than one city. He now presents with a primary complaint of progressively severe shortness of air with mild arterial oxygen desaturation that necessitated the institution of low flow nasal oxygen as an outpatient just a few weeks ago.  He does not feel better and asked me to review data. He does not have angina or symptoms of peripheral vascular or neurovascular disease.          The following portions of the patient's history were reviewed and updated as appropriate: allergies, current medications and problem list.    ROS: Pertinent positives as listed in the HPI.  All other systems reviewed and negative.    Objective:    Vitals:    01/10/19 1149 01/10/19 1151   BP: 126/71 109/60   BP Location: Left arm Left arm   Patient Position: Sitting Standing   Pulse: 71 74   Weight: 113 kg (250 lb) 113 kg (250 lb)   Height: 177.8 cm (70\") 177.8 cm (70\")       Physical Exam:  GENERAL: Alert, cooperative, in no acute distress.   HEENT: Normocephalic, no adenopathy, no jugular venous distention.  HEART: No discrete PMI is noted. Regular rhythm, normal rate, and no murmurs, gallops, or rubs.   LUNGS: Clear to auscultation " bilaterally. No wheezing, rales or ronchi.  ABDOMEN: Massively distended. No obvious ascites but the examination is limited, apparantly due to his massive spleenomegaly.  NEUROLOGIC: No focal abnormalities involving strength or sensation are noted.   EXTREMITIES: No clubbing, cyanosis, or edema noted.     Diagnostic Data:    Lab Results   Component Value Date    WBC 3.78 12/16/2018    HGB 9.8 (L) 12/16/2018    HCT 30.6 (L) 12/16/2018    MCV 99.7 (H) 12/16/2018     12/16/2018     Lab Results   Component Value Date    GLUCOSE 87 12/16/2018    BUN 19 12/16/2018    CREATININE 1.06 12/16/2018    EGFRIFNONA 69 12/16/2018    BCR 17.9 12/16/2018    K 4.0 12/16/2018    CO2 26.0 12/16/2018    CALCIUM 8.4 (L) 12/16/2018    ALBUMIN 3.97 12/17/2018    AST 26 12/17/2018    ALT 13 12/17/2018        BNP                        421                                                                 12/14/2018    Lab Results   Component Value Date    WBC 3.78 12/16/2018    HGB 9.8 (L) 12/16/2018    HCT 30.6 (L) 12/16/2018    MCV 99.7 (H) 12/16/2018     12/16/2018       Procedures      Assessment and Plan:     #1: Progressively severe SOA with O2 desaturation: The formulation is confounding due to a multiplicity of outside records/studies/opinions. At this time,  - To me (pulmonary disagrees), his SOA is more likely due to massive splenomegaly with pulmonary restriction (as is suggested by recent PFTs) than     heart failure, COPD, PA hypertension, or hepatopulmonary syndrome, etc. Recent ultrasounds have shown NO significant ascites that could be contributory.     He needs a diagnosis (from a cardiac standpoint) and this can be easily accomplished with a simple right heart catheterization that I have planned for tomorrow.    #2: Following #1, assuming that my hypothesis is correct, the etiology of his splenomegaly, with attendant leukopenia, anemia  IN THE ABSENCE OF CIRRHOSIS OR PORTAL HYPERTENSION (???) needs to be further  evaluated with a multidisciplinary team. Doubt that this is a reflection of Gilbert's disease but will defer to by hepatology colleagues in this regard.    I, Brant Napier MD, personally performed the services described as documented by the above named individual. I have made any necessary edits and it is both accurate and complete 1/10/2019  6:37 PM      Scribed for Brant Napier MD by Jennifer Hammonds RN. 01/10/2019 11:46 AM.        EMR Dragon/Transcription Disclaimer:  Much of this encounter note is an electronic transcription/translation of spoken language to printed text.  The electronic translation of spoken language may permit erroneous, or at times, nonsensical words or phrases to be inadvertently transcribed.  Although I have reviewed the note for such errors, some may still exist.

## 2019-01-11 ENCOUNTER — APPOINTMENT (OUTPATIENT)
Dept: CARDIOLOGY | Facility: HOSPITAL | Age: 69
End: 2019-01-11
Attending: INTERNAL MEDICINE

## 2019-01-11 ENCOUNTER — HOSPITAL ENCOUNTER (INPATIENT)
Facility: HOSPITAL | Age: 69
LOS: 11 days | Discharge: HOME | End: 2019-01-22
Attending: INTERNAL MEDICINE | Admitting: INTERNAL MEDICINE

## 2019-01-11 DIAGNOSIS — I34.0 MITRAL VALVE INSUFFICIENCY, UNSPECIFIED ETIOLOGY: ICD-10-CM

## 2019-01-11 DIAGNOSIS — R06.02 SOB (SHORTNESS OF BREATH): ICD-10-CM

## 2019-01-11 DIAGNOSIS — J43.9 PULMONARY EMPHYSEMA, UNSPECIFIED EMPHYSEMA TYPE (HCC): ICD-10-CM

## 2019-01-11 DIAGNOSIS — R06.00 DYSPNEA: ICD-10-CM

## 2019-01-11 LAB
ALBUMIN SERPL-MCNC: 4.02 G/DL (ref 3.2–4.8)
ALBUMIN/GLOB SERPL: 1.3 G/DL (ref 1.5–2.5)
ALP SERPL-CCNC: 44 U/L (ref 25–100)
ALT SERPL W P-5'-P-CCNC: 21 U/L (ref 7–40)
ANION GAP SERPL CALCULATED.3IONS-SCNC: 8 MMOL/L (ref 3–11)
ARTICHOKE IGE QN: 70 MG/DL (ref 0–130)
AST SERPL-CCNC: 25 U/L (ref 0–33)
BH CV ECHO MEAS - AO MAX PG: 17.9 MMHG
BH CV ECHO MEAS - AO MEAN PG: 7.3 MMHG
BH CV ECHO MEAS - AO V2 MAX: 211.7 CM/SEC
BH CV ECHO MEAS - AO V2 MEAN: 115.5 CM/SEC
BH CV ECHO MEAS - AO V2 VTI: 43.9 CM
BH CV ECHO MEAS - BSA(HAYCOCK): 2.4 M^2
BH CV ECHO MEAS - BSA: 2.3 M^2
BH CV ECHO MEAS - BZI_BMI: 35.7 KILOGRAMS/M^2
BH CV ECHO MEAS - BZI_METRIC_HEIGHT: 177.8 CM
BH CV ECHO MEAS - BZI_METRIC_WEIGHT: 112.9 KG
BH CV ECHO MEAS - RAP SYSTOLE: 8 MMHG
BH CV ECHO MEAS - TR MAX PG: 2 MMHG
BH CV ECHO MEAS - TR MAX VEL: 70.1 CM/SEC
BILIRUB SERPL-MCNC: 2.7 MG/DL (ref 0.3–1.2)
BUN BLD-MCNC: 24 MG/DL (ref 9–23)
BUN/CREAT SERPL: 13.3 (ref 7–25)
CALCIUM SPEC-SCNC: 9.1 MG/DL (ref 8.7–10.4)
CHLORIDE SERPL-SCNC: 112 MMOL/L (ref 99–109)
CHOLEST SERPL-MCNC: 105 MG/DL (ref 0–200)
CO2 SERPL-SCNC: 18 MMOL/L (ref 20–31)
CREAT BLD-MCNC: 1.8 MG/DL (ref 0.6–1.3)
DEPRECATED RDW RBC AUTO: 71.8 FL (ref 37–54)
ERYTHROCYTE [DISTWIDTH] IN BLOOD BY AUTOMATED COUNT: 20 % (ref 11.3–14.5)
GFR SERPL CREATININE-BSD FRML MDRD: 37 ML/MIN/1.73
GLOBULIN UR ELPH-MCNC: 3.2 GM/DL
GLUCOSE BLD-MCNC: 115 MG/DL (ref 70–100)
HBA1C MFR BLD: 3.9 % (ref 4.8–5.6)
HCT VFR BLD AUTO: 30.3 % (ref 38.9–50.9)
HDLC SERPL-MCNC: 29 MG/DL (ref 40–60)
HGB BLD-MCNC: 9.5 G/DL (ref 13.1–17.5)
LV EF 2D ECHO EST: 55 %
MCH RBC QN AUTO: 30.7 PG (ref 27–31)
MCHC RBC AUTO-ENTMCNC: 31.4 G/DL (ref 32–36)
MCV RBC AUTO: 98.1 FL (ref 80–99)
PLATELET # BLD AUTO: 307 10*3/MM3 (ref 150–450)
PMV BLD AUTO: 9.2 FL (ref 6–12)
POTASSIUM BLD-SCNC: 5.1 MMOL/L (ref 3.5–5.5)
PROT SERPL-MCNC: 7.2 G/DL (ref 5.7–8.2)
RBC # BLD AUTO: 3.09 10*6/MM3 (ref 4.2–5.76)
SODIUM BLD-SCNC: 138 MMOL/L (ref 132–146)
TRIGL SERPL-MCNC: 68 MG/DL (ref 0–150)
WBC NRBC COR # BLD: 5.61 10*3/MM3 (ref 3.5–10.8)

## 2019-01-11 PROCEDURE — 80053 COMPREHEN METABOLIC PANEL: CPT | Performed by: PHYSICIAN ASSISTANT

## 2019-01-11 PROCEDURE — C1894 INTRO/SHEATH, NON-LASER: HCPCS | Performed by: INTERNAL MEDICINE

## 2019-01-11 PROCEDURE — 94799 UNLISTED PULMONARY SVC/PX: CPT

## 2019-01-11 PROCEDURE — 85027 COMPLETE CBC AUTOMATED: CPT | Performed by: INTERNAL MEDICINE

## 2019-01-11 PROCEDURE — B24BZZ4 ULTRASONOGRAPHY OF HEART WITH AORTA, TRANSESOPHAGEAL: ICD-10-PCS | Performed by: INTERNAL MEDICINE

## 2019-01-11 PROCEDURE — 93451 RIGHT HEART CATH: CPT | Performed by: INTERNAL MEDICINE

## 2019-01-11 PROCEDURE — G0378 HOSPITAL OBSERVATION PER HR: HCPCS

## 2019-01-11 PROCEDURE — C1760 CLOSURE DEV, VASC: HCPCS | Performed by: INTERNAL MEDICINE

## 2019-01-11 PROCEDURE — 93320 DOPPLER ECHO COMPLETE: CPT | Performed by: INTERNAL MEDICINE

## 2019-01-11 PROCEDURE — 93312 ECHO TRANSESOPHAGEAL: CPT | Performed by: INTERNAL MEDICINE

## 2019-01-11 PROCEDURE — C1751 CATH, INF, PER/CENT/MIDLINE: HCPCS | Performed by: INTERNAL MEDICINE

## 2019-01-11 PROCEDURE — 25010000002 MIDAZOLAM PER 1 MG: Performed by: INTERNAL MEDICINE

## 2019-01-11 PROCEDURE — 93325 DOPPLER ECHO COLOR FLOW MAPG: CPT

## 2019-01-11 PROCEDURE — 93312 ECHO TRANSESOPHAGEAL: CPT

## 2019-01-11 PROCEDURE — 25010000002 FUROSEMIDE PER 20 MG: Performed by: INTERNAL MEDICINE

## 2019-01-11 PROCEDURE — 4A023N6 MEASUREMENT OF CARDIAC SAMPLING AND PRESSURE, RIGHT HEART, PERCUTANEOUS APPROACH: ICD-10-PCS | Performed by: INTERNAL MEDICINE

## 2019-01-11 PROCEDURE — 93320 DOPPLER ECHO COMPLETE: CPT

## 2019-01-11 PROCEDURE — 36415 COLL VENOUS BLD VENIPUNCTURE: CPT

## 2019-01-11 PROCEDURE — 83036 HEMOGLOBIN GLYCOSYLATED A1C: CPT | Performed by: INTERNAL MEDICINE

## 2019-01-11 PROCEDURE — 80061 LIPID PANEL: CPT | Performed by: INTERNAL MEDICINE

## 2019-01-11 PROCEDURE — 93325 DOPPLER ECHO COLOR FLOW MAPG: CPT | Performed by: INTERNAL MEDICINE

## 2019-01-11 PROCEDURE — 94640 AIRWAY INHALATION TREATMENT: CPT

## 2019-01-11 PROCEDURE — C1769 GUIDE WIRE: HCPCS | Performed by: INTERNAL MEDICINE

## 2019-01-11 PROCEDURE — 25010000002 FENTANYL CITRATE (PF) 100 MCG/2ML SOLUTION: Performed by: INTERNAL MEDICINE

## 2019-01-11 RX ORDER — NALOXONE HYDROCHLORIDE 0.4 MG/ML
INJECTION, SOLUTION INTRAMUSCULAR; INTRAVENOUS; SUBCUTANEOUS
Status: DISCONTINUED
Start: 2019-01-11 | End: 2019-01-11 | Stop reason: WASHOUT

## 2019-01-11 RX ORDER — FENTANYL CITRATE 50 UG/ML
INJECTION, SOLUTION INTRAMUSCULAR; INTRAVENOUS
Status: DISCONTINUED
Start: 2019-01-11 | End: 2019-01-22 | Stop reason: HOSPADM

## 2019-01-11 RX ORDER — POTASSIUM CHLORIDE 20 MEQ/1
20 TABLET, EXTENDED RELEASE ORAL DAILY
Status: DISCONTINUED | OUTPATIENT
Start: 2019-01-12 | End: 2019-01-17

## 2019-01-11 RX ORDER — MIDAZOLAM HYDROCHLORIDE 1 MG/ML
INJECTION INTRAMUSCULAR; INTRAVENOUS
Status: COMPLETED | OUTPATIENT
Start: 2019-01-11 | End: 2019-01-11

## 2019-01-11 RX ORDER — ALBUTEROL SULFATE 2.5 MG/3ML
2.5 SOLUTION RESPIRATORY (INHALATION) EVERY 6 HOURS PRN
Status: DISCONTINUED | OUTPATIENT
Start: 2019-01-11 | End: 2019-01-22 | Stop reason: HOSPADM

## 2019-01-11 RX ORDER — FLUMAZENIL 0.1 MG/ML
INJECTION INTRAVENOUS
Status: DISCONTINUED
Start: 2019-01-11 | End: 2019-01-11 | Stop reason: WASHOUT

## 2019-01-11 RX ORDER — LIDOCAINE HYDROCHLORIDE 10 MG/ML
INJECTION, SOLUTION EPIDURAL; INFILTRATION; INTRACAUDAL; PERINEURAL AS NEEDED
Status: DISCONTINUED | OUTPATIENT
Start: 2019-01-11 | End: 2019-01-11 | Stop reason: HOSPADM

## 2019-01-11 RX ORDER — ACETAMINOPHEN 325 MG/1
650 TABLET ORAL EVERY 4 HOURS PRN
Status: DISCONTINUED | OUTPATIENT
Start: 2019-01-11 | End: 2019-01-22 | Stop reason: HOSPADM

## 2019-01-11 RX ORDER — MIDAZOLAM HYDROCHLORIDE 1 MG/ML
INJECTION INTRAMUSCULAR; INTRAVENOUS
Status: DISCONTINUED
Start: 2019-01-11 | End: 2019-01-22 | Stop reason: HOSPADM

## 2019-01-11 RX ORDER — SULFAMETHOXAZOLE AND TRIMETHOPRIM 800; 160 MG/1; MG/1
1 TABLET ORAL EVERY 12 HOURS SCHEDULED
Status: COMPLETED | OUTPATIENT
Start: 2019-01-11 | End: 2019-01-18

## 2019-01-11 RX ORDER — FUROSEMIDE 10 MG/ML
40 INJECTION INTRAMUSCULAR; INTRAVENOUS EVERY 12 HOURS SCHEDULED
Status: DISCONTINUED | OUTPATIENT
Start: 2019-01-11 | End: 2019-01-17

## 2019-01-11 RX ORDER — FENTANYL CITRATE 50 UG/ML
INJECTION, SOLUTION INTRAMUSCULAR; INTRAVENOUS
Status: COMPLETED | OUTPATIENT
Start: 2019-01-11 | End: 2019-01-11

## 2019-01-11 RX ORDER — ALBUTEROL SULFATE 2.5 MG/3ML
2.5 SOLUTION RESPIRATORY (INHALATION)
Status: DISCONTINUED | OUTPATIENT
Start: 2019-01-11 | End: 2019-01-18

## 2019-01-11 RX ADMIN — FENTANYL CITRATE 25 MCG: 50 INJECTION, SOLUTION INTRAMUSCULAR; INTRAVENOUS at 12:38

## 2019-01-11 RX ADMIN — FENTANYL CITRATE 25 MCG: 50 INJECTION, SOLUTION INTRAMUSCULAR; INTRAVENOUS at 12:39

## 2019-01-11 RX ADMIN — ALBUTEROL SULFATE 2.5 MG: 2.5 SOLUTION RESPIRATORY (INHALATION) at 13:00

## 2019-01-11 RX ADMIN — MIDAZOLAM HYDROCHLORIDE 1 MG: 1 INJECTION, SOLUTION INTRAMUSCULAR; INTRAVENOUS at 12:50

## 2019-01-11 RX ADMIN — FUROSEMIDE 40 MG: 10 INJECTION, SOLUTION INTRAMUSCULAR; INTRAVENOUS at 15:25

## 2019-01-11 RX ADMIN — MIDAZOLAM HYDROCHLORIDE 2 MG: 1 INJECTION, SOLUTION INTRAMUSCULAR; INTRAVENOUS at 12:34

## 2019-01-11 RX ADMIN — ALBUTEROL SULFATE 2.5 MG: 2.5 SOLUTION RESPIRATORY (INHALATION) at 18:44

## 2019-01-11 RX ADMIN — MIDAZOLAM HYDROCHLORIDE 1 MG: 1 INJECTION, SOLUTION INTRAMUSCULAR; INTRAVENOUS at 12:38

## 2019-01-11 RX ADMIN — APIXABAN 5 MG: 5 TABLET, FILM COATED ORAL at 20:21

## 2019-01-11 RX ADMIN — FUROSEMIDE 40 MG: 10 INJECTION, SOLUTION INTRAMUSCULAR; INTRAVENOUS at 20:20

## 2019-01-11 RX ADMIN — SULFAMETHOXAZOLE AND TRIMETHOPRIM 160 MG: 800; 160 TABLET ORAL at 20:20

## 2019-01-11 RX ADMIN — MIDAZOLAM HYDROCHLORIDE 1 MG: 1 INJECTION, SOLUTION INTRAMUSCULAR; INTRAVENOUS at 12:39

## 2019-01-11 RX ADMIN — FENTANYL CITRATE 25 MCG: 50 INJECTION, SOLUTION INTRAMUSCULAR; INTRAVENOUS at 12:50

## 2019-01-11 RX ADMIN — FENTANYL CITRATE 50 MCG: 50 INJECTION, SOLUTION INTRAMUSCULAR; INTRAVENOUS at 12:34

## 2019-01-11 NOTE — PLAN OF CARE
Problem: Patient Care Overview  Goal: Plan of Care Review  Outcome: Ongoing (interventions implemented as appropriate)   01/11/19 5318   OTHER   Outcome Summary No reports of pain. Femoral site no signs of bleeding, soft and non tender. Pulses present. VSS. Shortness of breath with exertion. Chronically on 2L nasal cannula, currently on 3.5L nasal cannula.

## 2019-01-11 NOTE — INTERVAL H&P NOTE
"  H&P reviewed. The patient was examined and there are no changes to the H&P.       Vitals and Labs today:  Vitals:    01/11/19 0854 01/11/19 0856   BP: 130/64 121/56   BP Location: Right arm Left arm   Patient Position: Lying Lying   Pulse:  68   Resp:  20   Temp:  98.8 °F (37.1 °C)   TempSrc:  Temporal   SpO2:  96%   Weight:  113 kg (249 lb 5.4 oz)   Height:  177.8 cm (70\")       Lab Results   Component Value Date    WBC 5.61 01/11/2019    HGB 9.5 (L) 01/11/2019    HCT 30.3 (L) 01/11/2019    MCV 98.1 01/11/2019     01/11/2019     Lab Results   Component Value Date    GLUCOSE 115 (H) 01/11/2019    BUN 24 (H) 01/11/2019    CREATININE 1.80 (H) 01/11/2019    EGFRIFNONA 37 (L) 01/11/2019    BCR 13.3 01/11/2019    K 5.1 01/11/2019    CO2 18.0 (L) 01/11/2019    CALCIUM 9.1 01/11/2019    ALBUMIN 4.02 01/11/2019    AST 25 01/11/2019    ALT 21 01/11/2019     Lab Results   Component Value Date    CHOL 105 01/11/2019    TRIG 68 01/11/2019    HDL 29 (L) 01/11/2019    LDL 70 01/11/2019     BNP 12/14/2018: 421    Assessment:  · 67 y/o WM with history of nonobstructive CAD, mild MR, and PAF, with recent worsening dyspnea. He was admitted in mid December and was found to have massive splenomegaly. He presents for right heart catheterization today to further delineate etiology of his dyspnea.     Plan:  · Right heart catheterization. Risks, benefits and alternatives to the procedure explained to the patient and he understands and wishes to proceed.     I, Brant Napier MD, personally performed the services described as documented by the above named individual. I have made any necessary edits and it is both accurate and complete 1/11/2019  10:55 AM    "

## 2019-01-11 NOTE — PLAN OF CARE
Problem: Patient Care Overview  Goal: Plan of Care Review  Outcome: Ongoing (interventions implemented as appropriate)    Goal: Interprofessional Rounds/Family Conf  Outcome: Ongoing (interventions implemented as appropriate)      Problem: Chronic Obstructive Pulmonary Disease (Adult)  Goal: Signs and Symptoms of Listed Potential Problems Will be Absent, Minimized or Managed (Chronic Obstructive Pulmonary Disease)  Outcome: Ongoing (interventions implemented as appropriate)

## 2019-01-11 NOTE — PROGRESS NOTES
"  - My H and P from 1/10/19 incorrectly states that there is no edema. There was and is +4 lower extremity edema.  - The patient's demonstrated severe biventricular elevation in pressure with severe pulmonary artery hypertension occurring with no clear history of coronary heart disease or segmental wall motion abnormalities to suggest remote infarction (in fact the LV ejection fraction remains normal).  Family transesophageal echo today, it is extremely unlikely that the mitral valve is \"culprit\".  At this time, the \"best\" diagnosis is likely restrictive cardiomyopathy although I cannot exclude, certainty, the less likely occurrence of constrictive pericarditis.  I do NOT understand the etiology of the patient's spleenomegaly in the absence (by biopsy)  significant liver disease/cirrhosis and this will need to be addressed, along with the presence of possible hypersplenism (anemia and leukopenia).    I had a long talk with the patient and his wife this evening.  My plan is to:    -Diurese carefully with close attention to renal function.  -Ask pulmonary associates to revisit tomorrow.  -After a period of stabilization, I would like to refer him to the [facility] for studies that will begin with a cardiac MRI.  -There are a number of diagnostic possibilities but AMYLOID may be a unifying diagnosis.  "

## 2019-01-12 LAB
ANION GAP SERPL CALCULATED.3IONS-SCNC: 6 MMOL/L (ref 3–11)
BUN BLD-MCNC: 26 MG/DL (ref 9–23)
BUN/CREAT SERPL: 14.8 (ref 7–25)
CALCIUM SPEC-SCNC: 8.5 MG/DL (ref 8.7–10.4)
CHLORIDE SERPL-SCNC: 109 MMOL/L (ref 99–109)
CO2 SERPL-SCNC: 21 MMOL/L (ref 20–31)
CREAT BLD-MCNC: 1.76 MG/DL (ref 0.6–1.3)
GFR SERPL CREATININE-BSD FRML MDRD: 38 ML/MIN/1.73
GLUCOSE BLD-MCNC: 127 MG/DL (ref 70–100)
POTASSIUM BLD-SCNC: 4.2 MMOL/L (ref 3.5–5.5)
SODIUM BLD-SCNC: 136 MMOL/L (ref 132–146)

## 2019-01-12 PROCEDURE — 99221 1ST HOSP IP/OBS SF/LOW 40: CPT | Performed by: INTERNAL MEDICINE

## 2019-01-12 PROCEDURE — 94799 UNLISTED PULMONARY SVC/PX: CPT

## 2019-01-12 PROCEDURE — 94640 AIRWAY INHALATION TREATMENT: CPT

## 2019-01-12 PROCEDURE — 80048 BASIC METABOLIC PNL TOTAL CA: CPT | Performed by: INTERNAL MEDICINE

## 2019-01-12 PROCEDURE — 36415 COLL VENOUS BLD VENIPUNCTURE: CPT | Performed by: INTERNAL MEDICINE

## 2019-01-12 PROCEDURE — 99232 SBSQ HOSP IP/OBS MODERATE 35: CPT | Performed by: INTERNAL MEDICINE

## 2019-01-12 PROCEDURE — G0378 HOSPITAL OBSERVATION PER HR: HCPCS

## 2019-01-12 PROCEDURE — 25010000002 FUROSEMIDE PER 20 MG: Performed by: INTERNAL MEDICINE

## 2019-01-12 PROCEDURE — 94760 N-INVAS EAR/PLS OXIMETRY 1: CPT

## 2019-01-12 RX ADMIN — METOPROLOL TARTRATE 25 MG: 25 TABLET ORAL at 20:47

## 2019-01-12 RX ADMIN — FUROSEMIDE 40 MG: 10 INJECTION, SOLUTION INTRAMUSCULAR; INTRAVENOUS at 09:25

## 2019-01-12 RX ADMIN — SULFAMETHOXAZOLE AND TRIMETHOPRIM 160 MG: 800; 160 TABLET ORAL at 09:24

## 2019-01-12 RX ADMIN — FUROSEMIDE 40 MG: 10 INJECTION, SOLUTION INTRAMUSCULAR; INTRAVENOUS at 20:47

## 2019-01-12 RX ADMIN — ALBUTEROL SULFATE 2.5 MG: 2.5 SOLUTION RESPIRATORY (INHALATION) at 14:15

## 2019-01-12 RX ADMIN — METOPROLOL TARTRATE 25 MG: 25 TABLET ORAL at 09:25

## 2019-01-12 RX ADMIN — ALBUTEROL SULFATE 2.5 MG: 2.5 SOLUTION RESPIRATORY (INHALATION) at 01:06

## 2019-01-12 RX ADMIN — APIXABAN 5 MG: 5 TABLET, FILM COATED ORAL at 20:47

## 2019-01-12 RX ADMIN — SULFAMETHOXAZOLE AND TRIMETHOPRIM 160 MG: 800; 160 TABLET ORAL at 20:47

## 2019-01-12 RX ADMIN — APIXABAN 5 MG: 5 TABLET, FILM COATED ORAL at 09:25

## 2019-01-12 RX ADMIN — ALBUTEROL SULFATE 2.5 MG: 2.5 SOLUTION RESPIRATORY (INHALATION) at 19:49

## 2019-01-12 RX ADMIN — ALBUTEROL SULFATE 2.5 MG: 2.5 SOLUTION RESPIRATORY (INHALATION) at 07:52

## 2019-01-12 RX ADMIN — POTASSIUM CHLORIDE 20 MEQ: 20 TABLET, EXTENDED RELEASE ORAL at 09:25

## 2019-01-12 NOTE — PLAN OF CARE
Problem: Patient Care Overview  Goal: Individualization and Mutuality  Outcome: Ongoing (interventions implemented as appropriate)  Dry cough and SOB same, l foot pitting edema, l side tenderness  Goal: Discharge Needs Assessment  Outcome: Ongoing (interventions implemented as appropriate)    Goal: Interprofessional Rounds/Family Conf  Outcome: Ongoing (interventions implemented as appropriate)

## 2019-01-12 NOTE — PROGRESS NOTES
"  [facility]  PROGRESS NOTE    Date of Admission: 1/11/2019  Length of Stay: 0  Primary Care Physician: Jeane Baird MD    Chief Complaint: f/u dyspnea   Problem List:   1. Cardiac disease, multifactorial:              A. Non-obstructive CAD by cath, January 2008.              B. Atrial fibrillation. CV = 2, on Eliquis              C. Normal LV function.  D. \"moderate to severe\" mitral regurgitation by transesophageal echocardiogram 01/2017  E. \"moderate to severe\" mitral regurgitation by TTE 07/2017, unchanged from previous study   F. TTE, 12/15/18: biatrial enlargement, normal LVEF, mild MR, trivial pericardial effusion, mild PA hypertension  G. LAUREN 1/11/19: EF 55%, mild-mod MR, LA and RA are dilated, saline test results positive with valsalva for PFO  2. Obesity.  3.  Neurocardiogenic syncope.  4.  Cholelithiasis status post cholecystectomy, summer 2016:                          A. Procedure complicated by pneumonitis and need for blood transfusions.   5.  COPD and ANN MARIE, CPAP compliant:                          A. Followed by Yossi Blake MD.  6.  Chronic lower extremity venous insufficiency.  7. Chronic leukopenia and anemia  8.  Gilbert's syndrome   9.  \"Fatty liver with no cirrhosis\" diagnosed by liver biopsy, Summer 2018    10.  Hospitalization, 12/2017: shortness of breath                          A.  Bilateral pleural effusions/pericardial effusion noted.                          B.  IJR=247, negative troponins                          C.  \"MASSIVE\" spleenomegaly with no important ascites                          D.   Recent O2 for desaturation.                                                     Subjective      Patient still with dyspnea, slightly improved. Otherwise no complaints and denies chest pain or nausea.        Objective   Vitals: /62 (BP Location: Right arm, Patient Position: Sitting)   Pulse 69   Temp 98 °F (36.7 °C) (Oral)   Resp 20   " "Ht 177.8 cm (70\")   Wt 113 kg (249 lb 5.4 oz)   SpO2 96%   BMI 35.78 kg/m²     Physical Exam:  GENERAL: Alert, cooperative, in no acute distress.   HEENT: Normocephalic, no jugular venous distention  HEART: No discrete PMI is noted. Irregular rhythm, normal rate, and no murmurs, gallops, or rubs.   LUNGS: Wheezing bilaterally. No rales or rhonchi. 95% on 3L NC  ABDOMEN: Soft, bowel sounds present, non-tender   NEUROLOGIC: No focal abnormalities involving strength or sensation are noted.   EXTREMITIES: No clubbing, cyanosis., 2+ edema, and left leg erythema, warmth noted     Results:  Results from last 7 days   Lab Units  01/11/19   0850   WBC 10*3/mm3  5.61   HEMOGLOBIN g/dL  9.5*   HEMATOCRIT %  30.3*   PLATELETS 10*3/mm3  307     Results from last 7 days   Lab Units  01/12/19   0519  01/11/19   0850   SODIUM mmol/L  136  138   POTASSIUM mmol/L  4.2  5.1   CHLORIDE mmol/L  109  112*   CO2 mmol/L  21.0  18.0*   BUN mg/dL  26*  24*   CREATININE mg/dL  1.76*  1.80*   GLUCOSE mg/dL  127*  115*      Lab Results   Component Value Date    CHOL 105 01/11/2019    TRIG 68 01/11/2019    HDL 29 (L) 01/11/2019    LDL 70 01/11/2019    AST 25 01/11/2019    ALT 21 01/11/2019     Results from last 7 days   Lab Units  01/11/19   0850   HEMOGLOBIN A1C %  3.90*     Results from last 7 days   Lab Units  01/11/19   0850   CHOLESTEROL mg/dL  105   TRIGLYCERIDES mg/dL  68   HDL CHOL mg/dL  29*   LDL CHOL mg/dL  70       Intake/Output Summary (Last 24 hours) at 1/12/2019 1005  Last data filed at 1/12/2019 0915  Gross per 24 hour   Intake 720 ml   Output 3650 ml   Net -2930 ml     I personally reviewed the patient's EKG/Telemetry data    Radiology Data:   LAUREN 1/11/19:  Interpretation Summary     · Left ventricular systolic function is normal. Estimated EF = 55%.  · Left atrial cavity size is dilated.  · Interatrial septal aneurysm present.  · Saline test results are positive with valsalva manuever for evidence of a PFO.  · Right atrial " "cavity size is dilated.  · Mild-to-moderate mitral valve regurgitation is present        RHC 1/11/19:  Hemodynamic Findings:  · AO pressure: 145/73 mmHg (cuff)  · RA pressure, mean: 20 mmHg  · RV pressure: 80/20 mmHg  · PA pressure: 80/30, mean=54 mmHg  · PCWP pressure, mean: 30 with 50 mmHg \"V\"  · NO CO determination due to catheter failure.        Final Impression: Severe biventricular elevation in diastolic pressures and severe pulmonary artery hypertension.    Current Medications:    albuterol 2.5 mg Nebulization Q6H - RT   apixaban 5 mg Oral Q12H   fentaNYL citrate (PF)      Fluticasone Furoate-Vilanterol 1 puff Inhalation Daily   furosemide 40 mg Intravenous Q12H   metoprolol tartrate 25 mg Oral Q12H   midazolam      midazolam      O2 2 L/min Inhalation Q24H   potassium chloride 20 mEq Oral Daily   sulfamethoxazole-trimethoprim 1 tablet Oral Q12H          Assessment and Plan:   1. Dyspnea  - elevated biventricular pressures with severe PAH by RHC yesterday  - LAUREN with normal LVSF and mild-mod MR  - Pulmonary to see today   - diuresing well with IV Lasix 40mg BID  - will need to exclude restrictive cardiomyopathy (See Dr. Napier' note from 1/11)     2. CARA   - Cr is stable today  - continue to monitor closely with diuresis    3. Afib  - continue Eliquis, rate is controlled    4. Left leg cellulitis  - continue Bactrim     Electronically signed by Hattie Montoya PA-C, 01/12/19, 8:30 AM.    I have seen and examined the patient, case was discussed with the physician extender, reviewed the above note, necessary changes were made and I agree with the final note.   Akhil Calle MD, [facility], [facility]      "

## 2019-01-12 NOTE — NURSING NOTE
Pt rested well during the night. Patient maintaining adequate oxygen saturation on 2 liters of o2. Patient has had no c/o pain and femoral cath site is intact and soft. VSS

## 2019-01-12 NOTE — PLAN OF CARE
Problem: Patient Care Overview  Goal: Plan of Care Review  Outcome: Ongoing (interventions implemented as appropriate)   01/12/19 0332   Coping/Psychosocial   Plan of Care Reviewed With patient   Plan of Care Review   Progress no change       Problem: Chronic Obstructive Pulmonary Disease (Adult)  Goal: Signs and Symptoms of Listed Potential Problems Will be Absent, Minimized or Managed (Chronic Obstructive Pulmonary Disease)  Outcome: Ongoing (interventions implemented as appropriate)   01/12/19 0332   Goal/Outcome Evaluation   Problems Assessed (Chronic Obstructive Pulmonary Disease (COPD)) all   Problems Present (COPD, Bronch/Emphy) none

## 2019-01-13 LAB
ANION GAP SERPL CALCULATED.3IONS-SCNC: 6 MMOL/L (ref 3–11)
BUN BLD-MCNC: 24 MG/DL (ref 9–23)
BUN/CREAT SERPL: 14 (ref 7–25)
CALCIUM SPEC-SCNC: 8.7 MG/DL (ref 8.7–10.4)
CHLORIDE SERPL-SCNC: 111 MMOL/L (ref 99–109)
CO2 SERPL-SCNC: 21 MMOL/L (ref 20–31)
CREAT BLD-MCNC: 1.71 MG/DL (ref 0.6–1.3)
GFR SERPL CREATININE-BSD FRML MDRD: 40 ML/MIN/1.73
GLUCOSE BLD-MCNC: 97 MG/DL (ref 70–100)
POTASSIUM BLD-SCNC: 4.3 MMOL/L (ref 3.5–5.5)
SODIUM BLD-SCNC: 138 MMOL/L (ref 132–146)

## 2019-01-13 PROCEDURE — 99232 SBSQ HOSP IP/OBS MODERATE 35: CPT | Performed by: INTERNAL MEDICINE

## 2019-01-13 PROCEDURE — 94660 CPAP INITIATION&MGMT: CPT

## 2019-01-13 PROCEDURE — 25010000002 FUROSEMIDE PER 20 MG: Performed by: INTERNAL MEDICINE

## 2019-01-13 PROCEDURE — 94640 AIRWAY INHALATION TREATMENT: CPT

## 2019-01-13 PROCEDURE — G0378 HOSPITAL OBSERVATION PER HR: HCPCS

## 2019-01-13 PROCEDURE — 94799 UNLISTED PULMONARY SVC/PX: CPT

## 2019-01-13 PROCEDURE — 80048 BASIC METABOLIC PNL TOTAL CA: CPT | Performed by: PHYSICIAN ASSISTANT

## 2019-01-13 PROCEDURE — 36415 COLL VENOUS BLD VENIPUNCTURE: CPT | Performed by: PHYSICIAN ASSISTANT

## 2019-01-13 PROCEDURE — 94760 N-INVAS EAR/PLS OXIMETRY 1: CPT

## 2019-01-13 RX ADMIN — POTASSIUM CHLORIDE 20 MEQ: 20 TABLET, EXTENDED RELEASE ORAL at 09:41

## 2019-01-13 RX ADMIN — APIXABAN 5 MG: 5 TABLET, FILM COATED ORAL at 09:41

## 2019-01-13 RX ADMIN — FUROSEMIDE 40 MG: 10 INJECTION, SOLUTION INTRAMUSCULAR; INTRAVENOUS at 09:41

## 2019-01-13 RX ADMIN — METOPROLOL TARTRATE 25 MG: 25 TABLET ORAL at 09:43

## 2019-01-13 RX ADMIN — SULFAMETHOXAZOLE AND TRIMETHOPRIM 160 MG: 800; 160 TABLET ORAL at 09:41

## 2019-01-13 RX ADMIN — METOPROLOL TARTRATE 25 MG: 25 TABLET ORAL at 20:26

## 2019-01-13 RX ADMIN — ALBUTEROL SULFATE 2.5 MG: 2.5 SOLUTION RESPIRATORY (INHALATION) at 01:14

## 2019-01-13 RX ADMIN — FUROSEMIDE 40 MG: 10 INJECTION, SOLUTION INTRAMUSCULAR; INTRAVENOUS at 20:27

## 2019-01-13 RX ADMIN — ALBUTEROL SULFATE 2.5 MG: 2.5 SOLUTION RESPIRATORY (INHALATION) at 07:28

## 2019-01-13 RX ADMIN — ALBUTEROL SULFATE 2.5 MG: 2.5 SOLUTION RESPIRATORY (INHALATION) at 20:14

## 2019-01-13 RX ADMIN — APIXABAN 5 MG: 5 TABLET, FILM COATED ORAL at 20:26

## 2019-01-13 RX ADMIN — SULFAMETHOXAZOLE AND TRIMETHOPRIM 160 MG: 800; 160 TABLET ORAL at 20:26

## 2019-01-13 RX ADMIN — ALBUTEROL SULFATE 2.5 MG: 2.5 SOLUTION RESPIRATORY (INHALATION) at 13:35

## 2019-01-13 NOTE — PROGRESS NOTES
Pulmonary Follow-up      LOS: 0 days     Mr. Smith Craven, 68 y.o. male is followed for: Dyspnea      Subjective - Interval History   Pt with history of mild COPD, ANN MARIE and mitral regurgitation.  Has hypoxemia on exertion.  Has been found to have pulmonary hypertension.  Pt tried to use CPAP last PM but was not successful.     The patient's relevant past medical, surgical and social history were reviewed and updated in Epic as appropriate.     Objective     Infusions:     Medications:    albuterol 2.5 mg Nebulization Q6H - RT   apixaban 5 mg Oral Q12H   fentaNYL citrate (PF)      Fluticasone Furoate-Vilanterol 1 puff Inhalation Daily   furosemide 40 mg Intravenous Q12H   metoprolol tartrate 25 mg Oral Q12H   midazolam      midazolam      O2 2 L/min Inhalation Q24H   potassium chloride 20 mEq Oral Daily   sulfamethoxazole-trimethoprim 1 tablet Oral Q12H       Vital Sign Min/Max for last 24 hours  Temp  Min: 97 °F (36.1 °C)  Max: 98.1 °F (36.7 °C)   BP  Min: 100/54  Max: 107/65   Pulse  Min: 55  Max: 71   Resp  Min: 16  Max: 18   SpO2  Min: 94 %  Max: 97 %   No Data Recorded      Intake/Output Summary (Last 24 hours) at 1/13/2019 1436  Last data filed at 1/13/2019 1320  Gross per 24 hour   Intake 480 ml   Output 5100 ml   Net -4620 ml           Physical Exam:   GENERAL: awake and alert.   HEENT: normocephalic   LUNGS: diminished breath sounds   HEART: normal S1 and S2 irregularly irregular   ABDOMEN: soft, obese   EXTREMITIES: 3+ edema on Left leg   NEURO/PSYCH: no focal deficits.    Results from last 7 days   Lab Units  01/11/19   0850   WBC 10*3/mm3  5.61   HEMOGLOBIN g/dL  9.5*   PLATELETS 10*3/mm3  307     Results from last 7 days   Lab Units  01/13/19   0916  01/12/19   0519  01/11/19   0850   SODIUM mmol/L  138  136  138   POTASSIUM mmol/L  4.3  4.2  5.1   CO2 mmol/L  21.0  21.0  18.0*   BUN mg/dL  24*  26*  24*   CREATININE mg/dL  1.71*  1.76*  1.80*   GLUCOSE mg/dL  97  127*  115*     Estimated Creatinine  Clearance: 50.6 mL/min (A) (by C-G formula based on SCr of 1.71 mg/dL (H)).    Results from last 7 days   Lab Units  01/11/19   0850   HEMOGLOBIN A1C %  3.90*           No results found for: LACTATE       Images: no new images    I reviewed the patient's results and images.     Impression      Patient Active Problem List   Diagnosis Code   • Non-obstructive coronary artery disease I25.10   • Atrial fibrillation (CMS/HCC) I48.91   • Neurocardiogenic syncope R55   • SOB (shortness of breath) R06.02   • Calculus of gallbladder with biliary obstruction but without cholecystitis K80.21   • Bronchitis J40   • Obesity (BMI 30-39.9) E66.9   • Iron deficiency anemia D50.9   • Reactive airway disease J45.909   • ANN MARIE on CPAP G47.33, Z99.89   • Peripheral venous insufficiency I87.2   • COPD (chronic obstructive pulmonary disease) (CMS/HCC) J44.9   • Mitral valve regurgitation I34.0   • Gilbert's syndrome E80.4   • Splenomegaly D73.2   • Pleural effusion, bilateral J90   • Fatty liver- No cirrhosis by BX K76.0   • Mitral valve regurgitation, MILD MR I34.0   • Chronic anemia D63.8   • Pericardial effusion I31.3   • Mitral valve insufficiency I34.0            Plan        Encouraged patient to try CPAP. Await V/Q scan tomorrow.  Still think amyloidosis might be an explanation.  Will follow.   Plan of care and goals reviewed with nurse at daily rounds.   I discussed the patient's findings and my recommendations with patient and nursing staff       Michael Petty MD  Pulmonary and Critical Care Medicine  01/13/19 2:36 PM

## 2019-01-13 NOTE — PROGRESS NOTES
"  [facility]  PROGRESS NOTE    Date of Admission: 1/11/2019  Length of Stay: 0  Primary Care Physician: Jeane Baird MD    Chief Complaint: f/u dyspnea   Problem List:   1. Cardiac disease, multifactorial:              A. Non-obstructive CAD by cath, January 2008.              B. Atrial fibrillation. CV = 2, on Eliquis              C. Normal LV function.  D. \"moderate to severe\" mitral regurgitation by transesophageal echocardiogram 01/2017  E. \"moderate to severe\" mitral regurgitation by TTE 07/2017, unchanged from previous study   F. TTE, 12/15/18: biatrial enlargement, normal LVEF, mild MR, trivial pericardial effusion, mild PA hypertension  G. RHC 1/11/19: Severe biventricular elevation in diastolic pressures and severe PAH  H. LAUREN 1/11/19: EF 55%, mild-mod MR, LA and RA are dilated, saline test results positive with valsalva for PFO  2. Obesity.  3.  Neurocardiogenic syncope.  4.  Cholelithiasis status post cholecystectomy, summer 2016:                          A. Procedure complicated by pneumonitis and need for blood transfusions.   5.  COPD and ANN MARIE, CPAP compliant:                          A. Followed by Yossi Blake MD.  6.  Chronic lower extremity venous insufficiency.  7. Chronic leukopenia and anemia  8.  Gilbert's syndrome   9.  \"Fatty liver with no cirrhosis\" diagnosed by liver biopsy, Summer 2018    10.  Hospitalization, 12/2017: shortness of breath                          A.  Bilateral pleural effusions/pericardial effusion noted.                          B.  LYG=824, negative troponins                          C.  \"MASSIVE\" spleenomegaly with no important ascites                          D.   Recent O2 for desaturation.  11. Progressive dyspnea Winter 2018   A. Severe biventricular elevation in diastolic pressures by RHC January 2019   B. Severe PAH    C. LAUREN 1/11/19: EF 55%, mild-mod " "MR                                                    Subjective      Patient feels about the same in terms of breathing. Denies any other complaints.       Objective   Vitals: /65 (BP Location: Left arm, Patient Position: Lying)   Pulse 71   Temp 97.8 °F (36.6 °C) (Oral)   Resp 16   Ht 177.8 cm (70\")   Wt 113 kg (249 lb 5.4 oz)   SpO2 95%   BMI 35.78 kg/m²     Physical Exam:  GENERAL: Alert, cooperative, in no acute distress.   HEENT: Normocephalic, no jugular venous distention  HEART: No discrete PMI is noted. Irregular rhythm, normal rate, and no murmurs, gallops, or rubs.   LUNGS: Slight wheezing bilaterally, no rales or rhonchi.  ABDOMEN: Soft, bowel sounds present, non-tender   NEUROLOGIC: No focal abnormalities involving strength or sensation are noted.   EXTREMITIES: No clubbing, cyanosis. 2+ edema LLE, with erythema and warmth     Results:  Results from last 7 days   Lab Units  01/11/19   0850   WBC 10*3/mm3  5.61   HEMOGLOBIN g/dL  9.5*   HEMATOCRIT %  30.3*   PLATELETS 10*3/mm3  307     Results from last 7 days   Lab Units  01/12/19   0519  01/11/19   0850   SODIUM mmol/L  136  138   POTASSIUM mmol/L  4.2  5.1   CHLORIDE mmol/L  109  112*   CO2 mmol/L  21.0  18.0*   BUN mg/dL  26*  24*   CREATININE mg/dL  1.76*  1.80*   GLUCOSE mg/dL  127*  115*      Lab Results   Component Value Date    CHOL 105 01/11/2019    TRIG 68 01/11/2019    HDL 29 (L) 01/11/2019    LDL 70 01/11/2019    AST 25 01/11/2019    ALT 21 01/11/2019     Results from last 7 days   Lab Units  01/11/19   0850   HEMOGLOBIN A1C %  3.90*     Results from last 7 days   Lab Units  01/11/19   0850   CHOLESTEROL mg/dL  105   TRIGLYCERIDES mg/dL  68   HDL CHOL mg/dL  29*   LDL CHOL mg/dL  70       Intake/Output Summary (Last 24 hours) at 1/13/2019 0939  Last data filed at 1/13/2019 0316  Gross per 24 hour   Intake 240 ml   Output 2700 ml   Net -2460 ml     I personally reviewed the patient's EKG/Telemetry data    Radiology Data:   RHC " "1/11/19:  Hemodynamic Findings:  · AO pressure: 145/73 mmHg (cuff)  · RA pressure, mean: 20 mmHg  · RV pressure: 80/20 mmHg  · PA pressure: 80/30, mean=54 mmHg  · PCWP pressure, mean: 30 with 50 mmHg \"V\"  · NO CO determination due to catheter failure.        Final Impression: Severe biventricular elevation in diastolic pressures and severe pulmonary artery hypertension.       Current Medications:    albuterol 2.5 mg Nebulization Q6H - RT   apixaban 5 mg Oral Q12H   fentaNYL citrate (PF)      Fluticasone Furoate-Vilanterol 1 puff Inhalation Daily   furosemide 40 mg Intravenous Q12H   metoprolol tartrate 25 mg Oral Q12H   midazolam      midazolam      O2 2 L/min Inhalation Q24H   potassium chloride 20 mEq Oral Daily   sulfamethoxazole-trimethoprim 1 tablet Oral Q12H          Assessment and Plan:   1. Progressive class IV dyspnea  - elevated biventricular pressures with severe PAH by RHC   - LAUREN with normal LVSF and mild-mod MR  - diuresing with IV Lasix 40mg BID  - will need to exclude restrictive cardiomyopathy (See Dr. Napier' note from 1/11)   - appreciate pulmonary input      2. CARA   - labs this AM pending   - continue to monitor closely with diuresis     3. Afib  - continue Eliquis, rate is controlled     4. Left leg cellulitis  - continue Bactrim       Electronically signed by Hattie Montoya PA-C, 01/13/19, 8:28 AM.    I have seen and examined the patient, case was discussed with the physician extender, reviewed the above note, necessary changes were made and I agree with the final note.   Akhil Calle MD, [facility], [facility]          "

## 2019-01-13 NOTE — PLAN OF CARE
Problem: Patient Care Overview  Goal: Plan of Care Review  Outcome: Ongoing (interventions implemented as appropriate)   01/13/19 0459   Coping/Psychosocial   Plan of Care Reviewed With patient   Plan of Care Review   Progress no change       Problem: Chronic Obstructive Pulmonary Disease (Adult)  Goal: Signs and Symptoms of Listed Potential Problems Will be Absent, Minimized or Managed (Chronic Obstructive Pulmonary Disease)  Outcome: Ongoing (interventions implemented as appropriate)   01/13/19 0459   Goal/Outcome Evaluation   Problems Assessed (Chronic Obstructive Pulmonary Disease (COPD)) all   Problems Present (COPD, Bronch/Emphy) respiratory compromise

## 2019-01-14 ENCOUNTER — APPOINTMENT (OUTPATIENT)
Dept: NUCLEAR MEDICINE | Facility: HOSPITAL | Age: 69
End: 2019-01-14

## 2019-01-14 ENCOUNTER — APPOINTMENT (OUTPATIENT)
Dept: GENERAL RADIOLOGY | Facility: HOSPITAL | Age: 69
End: 2019-01-14

## 2019-01-14 PROBLEM — I42.5 RESTRICTIVE CARDIOMYOPATHY (HCC): Status: ACTIVE | Noted: 2019-01-14

## 2019-01-14 PROBLEM — I27.20 PULMONARY HYPERTENSION (HCC): Status: ACTIVE | Noted: 2019-01-14

## 2019-01-14 LAB
ANION GAP SERPL CALCULATED.3IONS-SCNC: 3 MMOL/L (ref 3–11)
BUN BLD-MCNC: 28 MG/DL (ref 9–23)
BUN/CREAT SERPL: 16.9 (ref 7–25)
CALCIUM SPEC-SCNC: 8.3 MG/DL (ref 8.7–10.4)
CHLORIDE SERPL-SCNC: 111 MMOL/L (ref 99–109)
CO2 SERPL-SCNC: 24 MMOL/L (ref 20–31)
CREAT BLD-MCNC: 1.66 MG/DL (ref 0.6–1.3)
GFR SERPL CREATININE-BSD FRML MDRD: 41 ML/MIN/1.73
GLUCOSE BLD-MCNC: 97 MG/DL (ref 70–100)
POTASSIUM BLD-SCNC: 4.3 MMOL/L (ref 3.5–5.5)
SODIUM BLD-SCNC: 138 MMOL/L (ref 132–146)

## 2019-01-14 PROCEDURE — 94640 AIRWAY INHALATION TREATMENT: CPT

## 2019-01-14 PROCEDURE — 94660 CPAP INITIATION&MGMT: CPT

## 2019-01-14 PROCEDURE — 99233 SBSQ HOSP IP/OBS HIGH 50: CPT | Performed by: INTERNAL MEDICINE

## 2019-01-14 PROCEDURE — 25010000002 FUROSEMIDE PER 20 MG: Performed by: INTERNAL MEDICINE

## 2019-01-14 PROCEDURE — 99221 1ST HOSP IP/OBS SF/LOW 40: CPT | Performed by: INTERNAL MEDICINE

## 2019-01-14 PROCEDURE — A9558 XE133 XENON 10MCI: HCPCS | Performed by: INTERNAL MEDICINE

## 2019-01-14 PROCEDURE — 94799 UNLISTED PULMONARY SVC/PX: CPT

## 2019-01-14 PROCEDURE — 78582 LUNG VENTILAT&PERFUS IMAGING: CPT

## 2019-01-14 PROCEDURE — 93010 ELECTROCARDIOGRAM REPORT: CPT | Performed by: INTERNAL MEDICINE

## 2019-01-14 PROCEDURE — 0 TECHNETIUM ALBUMIN AGGREGATED: Performed by: INTERNAL MEDICINE

## 2019-01-14 PROCEDURE — 93005 ELECTROCARDIOGRAM TRACING: CPT | Performed by: INTERNAL MEDICINE

## 2019-01-14 PROCEDURE — 71045 X-RAY EXAM CHEST 1 VIEW: CPT

## 2019-01-14 PROCEDURE — A9540 TC99M MAA: HCPCS | Performed by: INTERNAL MEDICINE

## 2019-01-14 PROCEDURE — 94760 N-INVAS EAR/PLS OXIMETRY 1: CPT

## 2019-01-14 PROCEDURE — 80048 BASIC METABOLIC PNL TOTAL CA: CPT | Performed by: PHYSICIAN ASSISTANT

## 2019-01-14 PROCEDURE — 0 XENON XE 133: Performed by: INTERNAL MEDICINE

## 2019-01-14 RX ADMIN — FUROSEMIDE 40 MG: 10 INJECTION, SOLUTION INTRAMUSCULAR; INTRAVENOUS at 08:15

## 2019-01-14 RX ADMIN — ALBUTEROL SULFATE 2.5 MG: 2.5 SOLUTION RESPIRATORY (INHALATION) at 18:56

## 2019-01-14 RX ADMIN — ACETAMINOPHEN 650 MG: 325 TABLET ORAL at 20:05

## 2019-01-14 RX ADMIN — SULFAMETHOXAZOLE AND TRIMETHOPRIM 160 MG: 800; 160 TABLET ORAL at 20:05

## 2019-01-14 RX ADMIN — APIXABAN 5 MG: 5 TABLET, FILM COATED ORAL at 20:05

## 2019-01-14 RX ADMIN — ALBUTEROL SULFATE 2.5 MG: 2.5 SOLUTION RESPIRATORY (INHALATION) at 13:31

## 2019-01-14 RX ADMIN — Medication 1 DOSE: at 11:22

## 2019-01-14 RX ADMIN — SULFAMETHOXAZOLE AND TRIMETHOPRIM 160 MG: 800; 160 TABLET ORAL at 08:15

## 2019-01-14 RX ADMIN — ALBUTEROL SULFATE 2.5 MG: 2.5 SOLUTION RESPIRATORY (INHALATION) at 07:15

## 2019-01-14 RX ADMIN — ALBUTEROL SULFATE 2.5 MG: 2.5 SOLUTION RESPIRATORY (INHALATION) at 01:06

## 2019-01-14 RX ADMIN — XENON XE-133 25.7 MILLICURIE: 10 GAS RESPIRATORY (INHALATION) at 11:10

## 2019-01-14 RX ADMIN — METOPROLOL TARTRATE 25 MG: 25 TABLET ORAL at 10:12

## 2019-01-14 RX ADMIN — FUROSEMIDE 40 MG: 10 INJECTION, SOLUTION INTRAMUSCULAR; INTRAVENOUS at 20:06

## 2019-01-14 RX ADMIN — POTASSIUM CHLORIDE 20 MEQ: 20 TABLET, EXTENDED RELEASE ORAL at 08:15

## 2019-01-14 RX ADMIN — APIXABAN 5 MG: 5 TABLET, FILM COATED ORAL at 08:15

## 2019-01-14 NOTE — PROGRESS NOTES
" PULMONARY NOTE     Hospital Day: 3    Mr. Smith Craven, 68 y.o. male is followed for:   Shortness of breath, COPD, and ANN MARIE         SUBJECTIVE     68-year-old male known to me with obstructive sleep apnea on CPAP therapy though with intermittent compliance as well as COPD with mild to moderate airway obstruction.  He now presents with increasing dyspnea.  Right cardiac catheterization reveals elevation of right-sided venous pressures, pulmonary pressures, and diastolic pressures.  A restrictive cardiomyopathy has been theorized.  He does have mitral regurgitation but Dr. Napier does not feel that this alone explains his physiology.    Interval history:    VQ scan today was low probability.  Chest x-ray continued to show small pleural effusions and enlarged heart.  He is symptomatically about the same.  Fluid balance is negative.    The patient's relevant past medical, surgical and social history were reviewed and updated in Epic as appropriate.        OBJECTIVE     Vital Sign Min/Max for last 24 hours  Temp  Min: 97.6 °F (36.4 °C)  Max: 98 °F (36.7 °C)   BP  Min: 101/49  Max: 115/78   Pulse  Min: 55  Max: 70   Resp  Min: 16  Max: 20   SpO2  Min: 92 %  Max: 100 %   No Data Recorded   Weight  Min: 110 kg (243 lb)  Max: 110 kg (243 lb)      Intake/Output Summary (Last 24 hours) at 1/14/2019 1731  Last data filed at 1/14/2019 1345  Gross per 24 hour   Intake 360 ml   Output 2485 ml   Net -2125 ml      Flowsheet Rows      First Filed Value   Admission Height  177.8 cm (70\") Documented at 01/11/2019 0856   Admission Weight  113 kg (249 lb 5.4 oz) Documented at 01/11/2019 0856        Body mass index is 34.87 kg/m².     01/11/19  0856 01/14/19  1217   Weight: 113 kg (249 lb 5.4 oz) 110 kg (243 lb)             Objective:  General Appearance:  In no acute distress.    Vital signs: (most recent): Blood pressure 107/61, pulse 62, temperature 97.6 °F (36.4 °C), temperature source Oral, resp. rate 18, height 177.8 cm (70\"), " weight 110 kg (243 lb), SpO2 100 %.    HEENT: Normal HEENT exam.    Lungs:  Normal effort and normal respiratory rate.  He is not in respiratory distress.  There are rales.  No wheezes or rhonchi.    Heart: Normal rate.  Regular rhythm.  S1 normal and S2 normal.  No murmur, gallop or friction rub.   Chest: Symmetric chest wall expansion.   Abdomen: Abdomen is soft and non-distended.  Bowel sounds are normal.   There is no abdominal tenderness.   There is no mass. There is no splenomegaly. There is no hepatomegaly.   Extremities: There is dependent edema.  There is no deformity.    Neurological: Patient is alert and oriented to person, place and time.    Pupils:  Pupils are equal, round, and reactive to light.    Skin:  Warm and dry.                      I reviewed the patient's results and images, including reviewing images and reports.    Medications reviewed.      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  He has splenomegaly but this could be due to his chronically elevated right-sided  venous pressures.  A unifying diagnosis such as amyloidosis has been theorized but has not been proven.          RECOMMENDATIONS     1. Continue current bronchodilators   2. Nocturnal CPAP   3. Supplemental oxygen at night and as needed during the day   4. Continued anticoagulation   5. Ongoing diuresis   6. Per cardiology          I discussed the patient's findings and my recommendations with patient and family         Yossi Blake MD  Pulmonary and Critical Care Medicine

## 2019-01-14 NOTE — PLAN OF CARE
Problem: Patient Care Overview  Goal: Plan of Care Review  Outcome: Ongoing (interventions implemented as appropriate)   01/14/19 4194   Coping/Psychosocial   Plan of Care Reviewed With patient;spouse   Plan of Care Review   Progress no change   OTHER   Outcome Summary Patient rested well overnight. Did wear CPAP this shift with nasal mask. Pt in a.fib-- roberto carlos at times, but HR improved with CPAP. Otherwise, VSS and no acute events overnight. Continue current POC        Problem: Chronic Obstructive Pulmonary Disease (Adult)  Goal: Signs and Symptoms of Listed Potential Problems Will be Absent, Minimized or Managed (Chronic Obstructive Pulmonary Disease)  Outcome: Ongoing (interventions implemented as appropriate)      Problem: Fall Risk (Adult)  Goal: Identify Related Risk Factors and Signs and Symptoms  Outcome: Ongoing (interventions implemented as appropriate)    Goal: Absence of Fall  Outcome: Ongoing (interventions implemented as appropriate)

## 2019-01-14 NOTE — PROGRESS NOTES
Discharge Planning Assessment  [facility]     Patient Name: Smith Craven  MRN: [id number]  Today's Date: 1/14/2019    Admit Date: 1/11/2019    Discharge Needs Assessment     Row Name 01/14/19 1112       Living Environment    Lives With  spouse    Name(s) of Who Lives With Patient  Joanne Craven    Current Living Arrangements  home/apartment/condo    Primary Care Provided by  self;spouse/significant other    Provides Primary Care For  no one    Family Caregiver if Needed  spouse    Family Caregiver Names  Joanne Craven    Quality of Family Relationships  involved;helpful;supportive    Able to Return to Prior Arrangements  yes       Resource/Environmental Concerns    Resource/Environmental Concerns  none       Transition Planning    Patient/Family Anticipates Transition to  home with family    Patient/Family Anticipated Services at Transition  none    Transportation Anticipated  family or friend will provide       Discharge Needs Assessment    Readmission Within the Last 30 Days  no previous admission in last 30 days    Concerns to be Addressed  no discharge needs identified;denies needs/concerns at this time    Equipment Currently Used at Home  bipap/cpap;nebulizer;oxygen    Anticipated Changes Related to Illness  none    Equipment Needed After Discharge  none    Discharge Coordination/Progress  Home        Discharge Plan     Row Name 01/14/19 1115       Plan    Plan  Home    Patient/Family in Agreement with Plan  yes    Plan Comments  Spoke with patient at bedside regarding discharge planning.  Patient denies use of HH and has a CPAP, Oxygen at 2L and Nebulizer that has been provided through [facility].  Patient reports that he has prescription coverage.  Patient lives with his wife in a single level house with a basement with ground level access.  Patient reports he does not access the basement and denies home safety concerns.  No discharge needs noted.  CM following.  Patient plan is to discharge home via car with  family to transport when medically ready.     Final Discharge Disposition Code  01 - home or self-care        Destination      No service coordination in this encounter.      Durable Medical Equipment      No service coordination in this encounter.      Dialysis/Infusion      No service coordination in this encounter.      Home Medical Care      No service coordination in this encounter.      Community Resources      No service coordination in this encounter.        Expected Discharge Date and Time     Expected Discharge Date Expected Discharge Time    Griff[place] 15, 2019         Demographic Summary     Row Name 01/14/19 1106       General Information    Admission Type  observation    Arrived From  home    Referral Source  admission list    Reason for Consult  discharge planning    Preferred Language  English     Used During This Interaction  no    General Information Comments  Jeane Baird MD       Contact Information    Permission Granted to Share Info With      Contact Information Obtained for      Contact Information Comments  Joanne Craven, spouse  [phone]  [phone]        Functional Status     Row Name 01/14/19 1112       Functional Status    Usual Activity Tolerance  good    Current Activity Tolerance  good       Functional Status, IADL    Medications  independent    Meal Preparation  independent    Housekeeping  independent    Laundry  independent    Shopping  independent       Employment/    Employment/ Comments  [employer] Replacement        Psychosocial    No documentation.       Abuse/Neglect    No documentation.       Legal    No documentation.       Substance Abuse    No documentation.       Patient Forms    No documentation.           Tayla Samano RN

## 2019-01-14 NOTE — PLAN OF CARE
Problem: Patient Care Overview  Goal: Plan of Care Review  Outcome: Ongoing (interventions implemented as appropriate)    Goal: Individualization and Mutuality  Outcome: Ongoing (interventions implemented as appropriate)    Goal: Discharge Needs Assessment  Outcome: Ongoing (interventions implemented as appropriate)    Goal: Interprofessional Rounds/Family Conf  Outcome: Ongoing (interventions implemented as appropriate)      Problem: Chronic Obstructive Pulmonary Disease (Adult)  Goal: Signs and Symptoms of Listed Potential Problems Will be Absent, Minimized or Managed (Chronic Obstructive Pulmonary Disease)  Outcome: Ongoing (interventions implemented as appropriate)

## 2019-01-14 NOTE — CONSULTS
REFERRING PHYSICIAN: Brant Napier MD    PRIMARY CARE PROVIDER: Jeane Baird MD    REASON FOR CONSULTATION: Severe Splenomegaly      HISTORY OF PRESENT ILLNESS: 69 yo gentleman followed by Dr. Ramirez (Hematologist in [place], [place]) for splenomegaly presents with worsening dyspnea.  He underwent a heart catheterization a few days ago which revealed severe pulmonary hypertension and biventricular diastolic pressures being fairly elevated.  Transesophageal echocardiogram revealed normal ejection fraction with possibility of a PFO present.  Dr. Napier asked me to review his pathology regarding concern for amyloidosis.  At his last hospitalization we had performed a bone marrow biopsy which was normal.  He also underwent a liver biopsy at  which was normal.  He does have decreased haptoglobin suggesting he has hemolysis ongoing.  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      No Known Allergies    Past Medical History:   Diagnosis Date   • A-fib (CMS/HCC)    • Anemia    • Arthritis    • Belching    • Bone marrow disorder    • CHF (congestive heart failure) (CMS/HCC)    • Cholelithiasis    • Congestive heart failure (CHF) (CMS/HCC)    • COPD (chronic obstructive pulmonary disease) (CMS/HCC) 10/26/2016   • Coronary artery disease    • Cough     PRODUCTIVE   • Elevated bilirubin    • Elevated LFTs    • Gastritis     H/ pylori gastritis   • H/O echocardiogram 12/03/2015    Study quality good. LV chamber size is midly dilated. Est EF 50-55%, Left atrium mod dilated. A patent marin ovale is not demonstrated with color doppler and agitated saline contrast, aortic valve leaflets mildly thickened, trace of aorta reg., mod mitral reg., mild tricuspid reg., RVSP 40mmHg, Trivial pericardial effusion visualized   • History of bleeding ulcers     2003   • History of transfusion    • Pneumonia     [id number]   • Reactive airway disease    • Shortness of breath          Current Facility-Administered Medications:   •  acetaminophen  (TYLENOL) tablet 650 mg, 650 mg, Oral, Q4H PRN, Brant Napier MD  •  albuterol (PROVENTIL) nebulizer solution 0.083% 2.5 mg/3mL, 2.5 mg, Nebulization, Q6H - RT, Brant Napier MD, 2.5 mg at 01/14/19 0715  •  albuterol (PROVENTIL) nebulizer solution 0.083% 2.5 mg/3mL, 2.5 mg, Nebulization, Q6H PRN, Hattie Montoya PA-C  •  apixaban (ELIQUIS) tablet 5 mg, 5 mg, Oral, Q12H, Brant Napier MD, 5 mg at 01/14/19 0815  •  fentaNYL citrate (PF) (SUBLIMAZE) 100 MCG/2ML injection  - ADS Override Pull, , , ,   •  Fluticasone Furoate-Vilanterol (BREO ELLIPTA) 200-25 MCG/INH inhaler 1 puff, 1 puff, Inhalation, Daily, Brant Napier MD  •  furosemide (LASIX) injection 40 mg, 40 mg, Intravenous, Q12H, Brant Napier MD, 40 mg at 01/14/19 0815  •  metoprolol tartrate (LOPRESSOR) tablet 25 mg, 25 mg, Oral, Q12H, Brant Napier MD, 25 mg at 01/14/19 1012  •  midazolam (VERSED) 2 MG/2ML injection  - ADS Override Pull, , , ,   •  midazolam (VERSED) 2 MG/2ML injection  - ADS Override Pull, , , ,   •  O2 (OXYGEN), 2 L/min, Inhalation, Q24H, Brant Napier MD, 2 L/min at 01/13/19 1148  •  potassium chloride (K-DUR,KLOR-CON) CR tablet 20 mEq, 20 mEq, Oral, Daily, Brant Napier MD, 20 mEq at 01/14/19 0815  •  sulfamethoxazole-trimethoprim (BACTRIM DS,SEPTRA DS) 800-160 MG per tablet 160 mg, 1 tablet, Oral, Q12H, Hattie Montoya PA-C, 160 mg at 01/14/19 0815    Past Surgical History:   Procedure Laterality Date   • CARDIAC CATHETERIZATION  06/17/2016    PER DR. NAPIER   • CARDIAC CATHETERIZATION Bilateral 6/17/2016    Procedure: Right and possible Left Heart Cath;  Surgeon: Brant Napier MD;  Location: [facility] INVASIVE LOCATION;  Service:    • CATARACT EXTRACTION Right 2016   • CHOLECYSTECTOMY  08/2016   • CHOLECYSTECTOMY WITH INTRAOPERATIVE CHOLANGIOGRAM N/A 7/29/2016    Procedure: CHOLECYSTECTOMY LAPAROSCOPIC INTRAOPERATIVE CHOLANGIOGRAM;  Surgeon: Max Morocho MD;  [facility];  Service:    • COLONOSCOPY     • ERCP N/A 7/28/2016    Procedure: ENDOSCOPIC RETROGRADE CHOLANGIOPANCREATOGRAPHY;  Surgeon: Oli George III, MD;  Location: [facility] OR;  Service:    • FRACTURE SURGERY     • JOINT REPLACEMENT     • LIVER BIOPSY  08/2018   • OTHER SURGICAL HISTORY      Cellulitis of left lug summer 2015   • REPLACEMENT TOTAL KNEE      left   • SKIN BIOPSY     • SKIN CANCER EXCISION  2015    REMOVED FROM FACE   • SKIN CANCER EXCISION     • UMBILICAL HERNIA REPAIR N/A 7/29/2016    Procedure: UMBILICAL HERNIA REPAIR;  Surgeon: Max Morocho MD;  Location: [facility] OR;  Service:    • VEIN LIGATION AND STRIPPING WITH LASER         Social History     Socioeconomic History   • Marital status:      Spouse name: Not on file   • Number of children: Not on file   • Years of education: Not on file   • Highest education level: Not on file   Tobacco Use   • Smoking status: Never Smoker   • Smokeless tobacco: Never Used   Substance and Sexual Activity   • Alcohol use: No   • Drug use: No   • Sexual activity: Defer     Partners: Female   Social History Narrative    Lives in [place]     Still works daily       Family History   Problem Relation Age of Onset   • Hypertension Other    • Arthritis Other    • Dementia Mother    • Hypertension Mother    • Atrial fibrillation Mother    • Coronary artery disease Father    • COPD Sister    • Arthritis Sister    • Arthritis Brother    • Hypertension Brother    • Diverticulitis Brother         No history exists.         REVIEW OF SYSTEMS:  A 14 point review of systems was performed and is negative except as noted below.    Review of Systems   Constitutional: Positive for fatigue.   HENT:  Negative.    Eyes: Negative.    Respiratory: Positive for shortness of breath.    Cardiovascular: Negative.    Gastrointestinal: Negative.    Endocrine: Negative.    Genitourinary: Negative.     Musculoskeletal: Negative.    Skin: Negative.    Neurological: Negative.     Hematological: Negative.    Psychiatric/Behavioral: Negative.          Objective     Vitals:    01/14/19 0401 01/14/19 0715 01/14/19 0733 01/14/19 1010   BP: 114/68  101/49 115/78   BP Location: Left arm  Right arm    Patient Position: Lying  Lying    Pulse: 62 68 61 70   Resp: 18  16    Temp: 97.8 °F (36.6 °C)  97.8 °F (36.6 °C)    TempSrc: Oral  Oral    SpO2:  92%  100%   Weight:       Height:           Temp:  [97.2 °F (36.2 °C)-97.8 °F (36.6 °C)] 97.8 °F (36.6 °C)     Performance Status: *1    Physical Exam    General: well appearing male in no acute distress  HEENT: sclera anicteric, oropharynx clear  Lymphatics: no cervical, supraclavicular, or axillary adenopathy  Cardiovascular: regular rate and rhythm, no murmurs  Lungs: clear to auscultation bilaterally  Abdomen: soft, nontender, nondistended.  + splenomegaly  Extremeties: no lower extremity edema  Skin: no rashes, lesions, bruising, or petechiae      LABS:    Lab Results   Component Value Date    HGB 9.5 (L) 01/11/2019    HCT 30.3 (L) 01/11/2019    MCV 98.1 01/11/2019     01/11/2019    WBC 5.61 01/11/2019    NEUTROABS 2.74 12/15/2018    LYMPHSABS 0.90 12/15/2018    MONOSABS 0.35 12/15/2018    EOSABS 0.06 12/15/2018    BASOSABS 0.00 12/15/2018     Lab Results   Component Value Date    GLUCOSE 97 01/14/2019    BUN 28 (H) 01/14/2019    CREATININE 1.66 (H) 01/14/2019     01/14/2019    K 4.3 01/14/2019     (H) 01/14/2019    CO2 24.0 01/14/2019    CALCIUM 8.3 (L) 01/14/2019    PROTEINTOT 7.2 01/11/2019    ALBUMIN 4.02 01/11/2019    BILITOT 2.7 (H) 01/11/2019    ALKPHOS 44 01/11/2019    AST 25 01/11/2019    ALT 21 01/11/2019         IMAGING    Ct Abdomen Pelvis With & Without Contrast    Result Date: 12/17/2018  EXAMINATION: CT CHEST WO CONTRAST-, CT ABDOMEN PELVIS W WO CONTRAST- 12/16/2018  INDICATION: Pleural effusion; J44.1-Chronic obstructive pulmonary disease with (acute) exacerbation; J96.21-Acute and chronic respiratory failure with  hypoxia; E80.4-Gilbert syndrome  TECHNIQUE: CT chest without intravenous contrast administration, CT abdomen and pelvis with and without intravenous contrast administration.  The radiation dose reduction device was turned on for each scan per the ALARA (As Low as Reasonably Achievable) protocol.  COMPARISON: Ultrasound abdomen 12/14/2018  FINDINGS:  CHEST: Thyroid is homogeneous in attenuation. No bulky mediastinal adenopathy. Central airways are patent. Esophagus in normal course and caliber. Atherosclerotic nonaneurysmal thoracic aorta. Cardiac size borderline enlarged with moderate pericardial fluid along the anterior margin which has mild lobulated margins. Atherosclerotic calcifications are present. Extended lung windows demonstrate upper lobe predominant mild centrilobular emphysema. Small bilateral pleural effusions right greater than left with adjacent atelectasis. No distinct focal consolidation otherwise noted.  Degenerative changes of the spine without aggressive osseous lesion. No soft tissue body wall findings specifically no bulky axillary adenopathy.  ABDOMEN AND PELVIS: Liver without focal lesion. Gallbladder surgically absent. No intrahepatic biliary dilatation. Poor visualization of the common bile duct without gross dilatation identified. Pancreas unremarkable. Spleen is enlarged to 20 cm in craniocaudal dimension with splenule along the inferior medial margin. Adrenals without distinct nodule. Kidneys without hydronephrosis or hydroureter. No bulky retroperitoneal adenopathy. GI tract evaluation without focal thickening or disproportionate dilatation of bowel. Appendix is visualized and unremarkable. No free fluid or intra-abdominal free air. Pelvic viscera grossly unremarkable with moderately enlarged prostate demonstrating calcifications and a decompressed urinary bladder. No free pelvic fluid or bulky pelvic adenopathy. Degenerative changes of the spine without aggressive osseous or soft  tissue body wall lesions of concern. Portal veins and IVC patent. Atherosclerotic nonaneurysmal patent abdominal aorta.      1. Small bilateral pleural effusions right greater than left with adjacent atelectasis. 2. Small to moderate pericardial fluid along the right heart margin which has a slight lobulated appearance however no calcifications concerning for potential pericardial disease process. 3. Status post cholecystectomy without gross intrahepatic or extrahepatic biliary dilatation of limited visualization. If there is further concern for biliary obstructive process recommend MRCP for further evaluation. 4. Spleen is enlarged to 20 cm in craniocaudal dimension without ascites.  D:  12/17/2018 E:  12/17/2018   This report was finalized on 12/17/2018 3:06 PM by Dr. Roger Weaver.      Ct Chest Without Contrast    Result Date: 12/17/2018  EXAMINATION: CT CHEST WO CONTRAST-, CT ABDOMEN PELVIS W WO CONTRAST- 12/16/2018  INDICATION: Pleural effusion; J44.1-Chronic obstructive pulmonary disease with (acute) exacerbation; J96.21-Acute and chronic respiratory failure with hypoxia; E80.4-Gilbert syndrome  TECHNIQUE: CT chest without intravenous contrast administration, CT abdomen and pelvis with and without intravenous contrast administration.  The radiation dose reduction device was turned on for each scan per the ALARA (As Low as Reasonably Achievable) protocol.  COMPARISON: Ultrasound abdomen 12/14/2018  FINDINGS:  CHEST: Thyroid is homogeneous in attenuation. No bulky mediastinal adenopathy. Central airways are patent. Esophagus in normal course and caliber. Atherosclerotic nonaneurysmal thoracic aorta. Cardiac size borderline enlarged with moderate pericardial fluid along the anterior margin which has mild lobulated margins. Atherosclerotic calcifications are present. Extended lung windows demonstrate upper lobe predominant mild centrilobular emphysema. Small bilateral pleural effusions right greater than left with  adjacent atelectasis. No distinct focal consolidation otherwise noted.  Degenerative changes of the spine without aggressive osseous lesion. No soft tissue body wall findings specifically no bulky axillary adenopathy.  ABDOMEN AND PELVIS: Liver without focal lesion. Gallbladder surgically absent. No intrahepatic biliary dilatation. Poor visualization of the common bile duct without gross dilatation identified. Pancreas unremarkable. Spleen is enlarged to 20 cm in craniocaudal dimension with splenule along the inferior medial margin. Adrenals without distinct nodule. Kidneys without hydronephrosis or hydroureter. No bulky retroperitoneal adenopathy. GI tract evaluation without focal thickening or disproportionate dilatation of bowel. Appendix is visualized and unremarkable. No free fluid or intra-abdominal free air. Pelvic viscera grossly unremarkable with moderately enlarged prostate demonstrating calcifications and a decompressed urinary bladder. No free pelvic fluid or bulky pelvic adenopathy. Degenerative changes of the spine without aggressive osseous or soft tissue body wall lesions of concern. Portal veins and IVC patent. Atherosclerotic nonaneurysmal patent abdominal aorta.      1. Small bilateral pleural effusions right greater than left with adjacent atelectasis. 2. Small to moderate pericardial fluid along the right heart margin which has a slight lobulated appearance however no calcifications concerning for potential pericardial disease process. 3. Status post cholecystectomy without gross intrahepatic or extrahepatic biliary dilatation of limited visualization. If there is further concern for biliary obstructive process recommend MRCP for further evaluation. 4. Spleen is enlarged to 20 cm in craniocaudal dimension without ascites.  D:  12/17/2018 E:  12/17/2018   This report was finalized on 12/17/2018 3:06 PM by Dr. Roger Weaver.        ASSESSMENT/PLAN:    1.  Severe splenomegaly in the face of  significant pulmonary hypertension.  I suspect this is vascular congestion related to increased right-sided heart pressures causing splenomegaly.  His bone marrow appears to be normal.  I have requested a [place] red stain be done on the bone marrow here and also the liver biopsy at .  I suspect both of those will be negative.  If his spleen is causing a lot of trouble  Especially with cytopenias then we can consider a splenectomy.  Unfortunately in the face of severe pulmonary pressures his surgical risk would be significant.  I will vaccinated against encapsulated bacteria him in anticipation of possible splenectomy down the line if necessary.        Carlos Wright MD    1/14/2019      Please note that portions of this note may have been completed with a voice recognition program. Efforts were made to edit the dictations, but occasionally words are mistranscribed.

## 2019-01-14 NOTE — PROGRESS NOTES
"  [facility]  PROGRESS NOTE    Date of Admission: 1/11/2019  Length of Stay: 0  Primary Care Physician: Jeane Baird MD    Chief Complaint: f/u class IV dyspnea   Problem List:   1. Cardiac disease, multifactorial:              A. Non-obstructive CAD by cath, January 2008.              B. Atrial fibrillation. CV = 2, on Eliquis              C. Normal LV function.  D. \"moderate to severe\" mitral regurgitation by transesophageal echocardiogram 01/2017  E. \"moderate to severe\" mitral regurgitation by TTE 07/2017, unchanged from previous study   F. TTE, 12/15/18: biatrial enlargement, normal LVEF, mild MR, trivial pericardial effusion, mild PA hypertension  G. RHC 1/11/19: Severe biventricular elevation in diastolic pressures and severe PAH  H. LAUREN 1/11/19: EF 55%, mild-mod MR, LA and RA are dilated, saline test results positive with valsalva for PFO  2. Obesity.  3.  Neurocardiogenic syncope.  4.  Cholelithiasis status post cholecystectomy, summer 2016:                          A. Procedure complicated by pneumonitis and need for blood transfusions.   5.  COPD and ANN MARIE, CPAP compliant:                          A. Followed by Yossi Blake MD.  6.  Chronic lower extremity venous insufficiency.  7. Chronic leukopenia and anemia  8.  Gilbert's syndrome   9.  \"Fatty liver with no cirrhosis\" diagnosed by liver biopsy, Summer 2018    10.  Hospitalization, 12/2017: shortness of breath                          A.  Bilateral pleural effusions/pericardial effusion noted.                          B.  MTS=699, negative troponins                          C.  \"MASSIVE\" spleenomegaly with no important ascites                          D.   Recent O2 for desaturation.  11. Progressive dyspnea Winter 2018              A. Severe biventricular elevation in diastolic pressures by RHC January 2019              B. Severe PAH               C. LAUREN 1/11/19: EF 55%, mild-mod " "MR                                                  Subjective      Patient feels overall improved from admission on Friday  in terms of breathing. Was able to tolerate CPAP for a few hours last night.       Objective   Vitals: /49 (BP Location: Right arm, Patient Position: Lying)   Pulse 61   Temp 97.8 °F (36.6 °C) (Oral)   Resp 16   Ht 177.8 cm (70\")   Wt 113 kg (249 lb 5.4 oz)   SpO2 92%   BMI 35.78 kg/m²     Physical Exam:  GENERAL: Obese, cooperative, in no acute distress.   HEENT: Normocephalic, + jugular venous distention  HEART: No discrete PMI is noted. Regular rhythm, normal rate, and no murmurs, gallops, or rubs. Distant.  LUNGS: Mild expiratory wheezing. No rales or rhonchi. 96% on 3L NC; bibasilar diminished breath sounds.  ABDOMEN: Soft, bowel sounds present, non-tender   NEUROLOGIC: No focal abnormalities involving strength or sensation are noted.   EXTREMITIES: No clubbing, cyanosis. 2+ LLE edema with mild erythema and warmth on left, \"better\" per patient.    Results:  Results from last 7 days   Lab Units  01/11/19   0850   WBC 10*3/mm3  5.61   HEMOGLOBIN g/dL  9.5*   HEMATOCRIT %  30.3*   PLATELETS 10*3/mm3  307     Results from last 7 days   Lab Units  01/14/19   0640  01/13/19   0916  01/12/19   0519   SODIUM mmol/L  138  138  136   POTASSIUM mmol/L  4.3  4.3  4.2   CHLORIDE mmol/L  111*  111*  109   CO2 mmol/L  24.0  21.0  21.0   BUN mg/dL  28*  24*  26*   CREATININE mg/dL  1.66*  1.71*  1.76*   GLUCOSE mg/dL  97  97  127*      Lab Results   Component Value Date    CHOL 105 01/11/2019    TRIG 68 01/11/2019    HDL 29 (L) 01/11/2019    LDL 70 01/11/2019    AST 25 01/11/2019    ALT 21 01/11/2019     Results from last 7 days   Lab Units  01/11/19   0850   HEMOGLOBIN A1C %  3.90*     Results from last 7 days   Lab Units  01/11/19   0850   CHOLESTEROL mg/dL  105   TRIGLYCERIDES mg/dL  68   HDL CHOL mg/dL  29*   LDL CHOL mg/dL  70       Intake/Output Summary (Last 24 hours) at 1/14/2019 " "0905  Last data filed at 1/14/2019 0730  Gross per 24 hour   Intake 720 ml   Output 4910 ml   Net -4190 ml     I personally reviewed the patient's EKG/Telemetry data    Radiology Data:   RHC 1/11/19:  Hemodynamic Findings:  · AO pressure: 145/73 mmHg (cuff)  · RA pressure, mean: 20 mmHg  · RV pressure: 80/20 mmHg  · PA pressure: 80/30, mean=54 mmHg  · PCWP pressure, mean: 30 with 50 mmHg \"V\"  · NO CO determination due to catheter failure.        Final Impression: Severe biventricular elevation in diastolic pressures and severe pulmonary artery hypertension.     LAUREN 1/11/19:  Interpretation Summary     · Left ventricular systolic function is normal. Estimated EF = 55%.  · Left atrial cavity size is dilated.  · Interatrial septal aneurysm present.  · Saline test results are positive with valsalva manuever for evidence of a PFO.  · Right atrial cavity size is dilated.  · Mild-to-moderate mitral valve regurgitation is present     Current Medications:    albuterol 2.5 mg Nebulization Q6H - RT   apixaban 5 mg Oral Q12H   fentaNYL citrate (PF)      Fluticasone Furoate-Vilanterol 1 puff Inhalation Daily   furosemide 40 mg Intravenous Q12H   metoprolol tartrate 25 mg Oral Q12H   midazolam      midazolam      O2 2 L/min Inhalation Q24H   potassium chloride 20 mEq Oral Daily   sulfamethoxazole-trimethoprim 1 tablet Oral Q12H          Assessment and Plan:     1. Progressive class IV dyspnea  - elevated biventricular pressures with severe PAH by RHC   - LAUREN with normal LVSF and mild-mod MR  - diuresing with IV Lasix 40mg BID (net -9.5L since admission)   - will need to exclude restrictive cardiomyopathy (See Dr. Napier' note from 1/11)   - appreciate pulmonary input --> VQ scan  - Spoke with Dr. Wright this AM; he will review BM, etc to make sure we have amyloid stains.      2. CARA   - Cr is slightly improved this AM 1.7-->1.6   - continue to monitor closely with diuresis     3. Afib  - continue Eliquis, rate is " controlled     4. Left leg cellulitis  - seems improved   - continue Bactrim     I, Brant Napier MD, [facility], [facility], personally performed the services described in this documentation as scribed by the above named individual in my presence, and it is both accurate and complete. At 10:09 AM on 01/10/2019     Electronically signed by Hattie Montoya PA-C, 01/14/19, 9:08 AM.

## 2019-01-15 LAB
ANION GAP SERPL CALCULATED.3IONS-SCNC: 6 MMOL/L (ref 3–11)
BUN BLD-MCNC: 29 MG/DL (ref 9–23)
BUN/CREAT SERPL: 17.4 (ref 7–25)
CALCIUM SPEC-SCNC: 8.1 MG/DL (ref 8.7–10.4)
CHLORIDE SERPL-SCNC: 110 MMOL/L (ref 99–109)
CO2 SERPL-SCNC: 23 MMOL/L (ref 20–31)
CREAT BLD-MCNC: 1.67 MG/DL (ref 0.6–1.3)
GFR SERPL CREATININE-BSD FRML MDRD: 41 ML/MIN/1.73
GLUCOSE BLD-MCNC: 112 MG/DL (ref 70–100)
POTASSIUM BLD-SCNC: 3.9 MMOL/L (ref 3.5–5.5)
SODIUM BLD-SCNC: 139 MMOL/L (ref 132–146)

## 2019-01-15 PROCEDURE — 25010000002 PNEUMOCOCCAL CONJ. 13-VALENT SUSPENSION

## 2019-01-15 PROCEDURE — G0009 ADMIN PNEUMOCOCCAL VACCINE: HCPCS

## 2019-01-15 PROCEDURE — 94799 UNLISTED PULMONARY SVC/PX: CPT

## 2019-01-15 PROCEDURE — 94660 CPAP INITIATION&MGMT: CPT

## 2019-01-15 PROCEDURE — 25010000002 MENINGOCOCCAL RECONSTITUTED SOLUTION: Performed by: INTERNAL MEDICINE

## 2019-01-15 PROCEDURE — 99232 SBSQ HOSP IP/OBS MODERATE 35: CPT | Performed by: INTERNAL MEDICINE

## 2019-01-15 PROCEDURE — 90734 MENACWYD/MENACWYCRM VACC IM: CPT | Performed by: INTERNAL MEDICINE

## 2019-01-15 PROCEDURE — G0008 ADMIN INFLUENZA VIRUS VAC: HCPCS | Performed by: INTERNAL MEDICINE

## 2019-01-15 PROCEDURE — 99231 SBSQ HOSP IP/OBS SF/LOW 25: CPT | Performed by: INTERNAL MEDICINE

## 2019-01-15 PROCEDURE — 90670 PCV13 VACCINE IM: CPT

## 2019-01-15 PROCEDURE — 99233 SBSQ HOSP IP/OBS HIGH 50: CPT | Performed by: INTERNAL MEDICINE

## 2019-01-15 PROCEDURE — 36415 COLL VENOUS BLD VENIPUNCTURE: CPT | Performed by: INTERNAL MEDICINE

## 2019-01-15 PROCEDURE — 25010000002 FUROSEMIDE PER 20 MG: Performed by: INTERNAL MEDICINE

## 2019-01-15 PROCEDURE — 80048 BASIC METABOLIC PNL TOTAL CA: CPT | Performed by: INTERNAL MEDICINE

## 2019-01-15 PROCEDURE — 94640 AIRWAY INHALATION TREATMENT: CPT

## 2019-01-15 RX ADMIN — POTASSIUM CHLORIDE 20 MEQ: 20 TABLET, EXTENDED RELEASE ORAL at 09:54

## 2019-01-15 RX ADMIN — METOPROLOL TARTRATE 25 MG: 25 TABLET ORAL at 09:54

## 2019-01-15 RX ADMIN — ALBUTEROL SULFATE 2.5 MG: 2.5 SOLUTION RESPIRATORY (INHALATION) at 00:44

## 2019-01-15 RX ADMIN — APIXABAN 5 MG: 5 TABLET, FILM COATED ORAL at 09:54

## 2019-01-15 RX ADMIN — SULFAMETHOXAZOLE AND TRIMETHOPRIM 160 MG: 800; 160 TABLET ORAL at 09:54

## 2019-01-15 RX ADMIN — HAEMOPHILUS B POLYSACCHARIDE CONJUGATE VACCINE FOR INJ 0.5 ML: RECON SOLN at 09:55

## 2019-01-15 RX ADMIN — METOPROLOL TARTRATE 25 MG: 25 TABLET ORAL at 22:06

## 2019-01-15 RX ADMIN — Medication 0.5 ML: at 17:36

## 2019-01-15 RX ADMIN — ALBUTEROL SULFATE 2.5 MG: 2.5 SOLUTION RESPIRATORY (INHALATION) at 07:38

## 2019-01-15 RX ADMIN — ALBUTEROL SULFATE 2.5 MG: 2.5 SOLUTION RESPIRATORY (INHALATION) at 13:40

## 2019-01-15 RX ADMIN — ALBUTEROL SULFATE 2.5 MG: 2.5 SOLUTION RESPIRATORY (INHALATION) at 18:49

## 2019-01-15 RX ADMIN — SULFAMETHOXAZOLE AND TRIMETHOPRIM 160 MG: 800; 160 TABLET ORAL at 22:06

## 2019-01-15 RX ADMIN — PNEUMOCOCCAL 13-VALENT CONJUGATE VACCINE 0.5 ML: 2.2; 2.2; 2.2; 2.2; 2.2; 4.4; 2.2; 2.2; 2.2; 2.2; 2.2; 2.2; 2.2 INJECTION, SUSPENSION INTRAMUSCULAR at 09:55

## 2019-01-15 RX ADMIN — APIXABAN 5 MG: 5 TABLET, FILM COATED ORAL at 22:06

## 2019-01-15 RX ADMIN — FUROSEMIDE 40 MG: 10 INJECTION, SOLUTION INTRAMUSCULAR; INTRAVENOUS at 09:54

## 2019-01-15 RX ADMIN — FUROSEMIDE 40 MG: 10 INJECTION, SOLUTION INTRAMUSCULAR; INTRAVENOUS at 22:07

## 2019-01-15 NOTE — PROGRESS NOTES
"  [facility]  PROGRESS NOTE    Date of Admission: 1/11/2019  Length of Stay: 4  Primary Care Physician: Jeane Baird MD    Chief Complaint: f/u class IV dyspnea   Problem List:   1. Cardiac disease, multifactorial:              A. Non-obstructive CAD by cath, January 2008.              B. Atrial fibrillation. CV = 2, on Eliquis              C. Normal LV function.  D. \"moderate to severe\" mitral regurgitation by transesophageal echocardiogram 01/2017  E. \"moderate to severe\" mitral regurgitation by TTE 07/2017, unchanged from previous study   F. TTE, 12/15/18: biatrial enlargement, normal LVEF, mild MR, trivial pericardial effusion, mild PA hypertension  G. RHC 1/11/19: Severe biventricular elevation in diastolic pressures and severe PAH  H. LAUREN 1/11/19: EF 55%, mild-mod MR, LA and RA are dilated, saline test results positive with valsalva for PFO  2. Obesity.  3.  Neurocardiogenic syncope.  4.  Cholelithiasis status post cholecystectomy, summer 2016:                          A. Procedure complicated by pneumonitis and need for blood transfusions.   5.  COPD and ANN MARIE, CPAP compliant:                          A. Followed by Yossi Blake MD.  6.  Chronic lower extremity venous insufficiency.  7. Chronic leukopenia and anemia  8.  Gilbert's syndrome   9.  \"Fatty liver with no cirrhosis or amyloid\" diagnosed by liver biopsy, Summer 2018    10.  Hospitalization, 12/2017: shortness of breath                          A.  Bilateral pleural effusions/pericardial effusion noted.                          B.  WKC=908, negative troponins                          C.  \"MASSIVE\" spleenomegaly with no important ascites                          D.   Recent O2 for desaturation.  11. Progressive dyspnea Winter 2018              A. Severe biventricular elevation in diastolic pressures by RHC January 2019              B. Severe PAH               C. LAUREN 1/11/19: EF 55%, mild-mod " "MR    Subjective      Patient feels about the same, still diuresing. Wife reports periods of bradycardia during night while sleeping with HR's in the 30's      Objective   Vitals: /57 (BP Location: Left arm, Patient Position: Lying)   Pulse 63   Temp 98 °F (36.7 °C) (Oral)   Resp 16   Ht 177.8 cm (70\")   Wt 110 kg (243 lb 6.4 oz)   SpO2 96%   BMI 34.92 kg/m²     Physical Exam:  GENERAL: Alert, cooperative, in no acute distress.   HEENT: Normocephalic, no jugular venous distention  HEART: No discrete PMI is noted. Irregular rhythm, normal rate, and no murmurs, gallops, or rubs.   LUNGS:  No wheezing, rales or rhonchi.  ABDOMEN: Soft, bowel sounds present, non-tender   NEUROLOGIC: No focal abnormalities involving strength or sensation are noted.   EXTREMITIES: No clubbing, cyanosis. 1+ edema LLE, mild erythema and warmth     Results:  Results from last 7 days   Lab Units  01/11/19   0850   WBC 10*3/mm3  5.61   HEMOGLOBIN g/dL  9.5*   HEMATOCRIT %  30.3*   PLATELETS 10*3/mm3  307     Results from last 7 days   Lab Units  01/15/19   0608  01/14/19   0640  01/13/19   0916   SODIUM mmol/L  139  138  138   POTASSIUM mmol/L  3.9  4.3  4.3   CHLORIDE mmol/L  110*  111*  111*   CO2 mmol/L  23.0  24.0  21.0   BUN mg/dL  29*  28*  24*   CREATININE mg/dL  1.67*  1.66*  1.71*   GLUCOSE mg/dL  112*  97  97      Lab Results   Component Value Date    CHOL 105 01/11/2019    TRIG 68 01/11/2019    HDL 29 (L) 01/11/2019    LDL 70 01/11/2019    AST 25 01/11/2019    ALT 21 01/11/2019     Results from last 7 days   Lab Units  01/11/19   0850   HEMOGLOBIN A1C %  3.90*     Results from last 7 days   Lab Units  01/11/19   0850   CHOLESTEROL mg/dL  105   TRIGLYCERIDES mg/dL  68   HDL CHOL mg/dL  29*   LDL CHOL mg/dL  70       Intake/Output Summary (Last 24 hours) at 1/15/2019 0844  Last data filed at 1/15/2019 0734  Gross per 24 hour   Intake 720 ml   Output 2655 ml   Net -1935 ml     I personally reviewed the patient's " "EKG/Telemetry data    Radiology Data:   CXR 1/14/19:  IMPRESSION:  Small bilateral pleural effusions with mild increased  markings identified left lung base. No acute parenchymal disease.    VQ scan 1/14/19:  IMPRESSION:  Essentially normal exam.    Current Medications:    albuterol 2.5 mg Nebulization Q6H - RT   apixaban 5 mg Oral Q12H   fentaNYL citrate (PF)      Fluticasone Furoate-Vilanterol 1 puff Inhalation Daily   furosemide 40 mg Intravenous Q12H   metoprolol tartrate 25 mg Oral Q12H   midazolam      midazolam      O2 2 L/min Inhalation Q24H   potassium chloride 20 mEq Oral Daily   sulfamethoxazole-trimethoprim 1 tablet Oral Q12H          Assessment and Plan:     1. Progressive class IV dyspnea  - elevated biventricular pressures with severe PAH by RHC   - LAUREN with normal LVSF and mild-mod MR  - diuresing with IV Lasix 40mg BID (net -5L since admission)   - will need to exclude restrictive cardiomyopathy (See Dr. Napier' note from 1/11)   - appreciate pulmonary and Heme/Onc input   - amyloid stain negative on liver biopsy from  per Dr. Wright     2. CARA   - Cr remains stable    - continue to monitor closely with diuresis     3. Afib  - continue Eliquis, rate is controlled     4. Left leg cellulitis  - seems improved   - continue Bactrim     Further evaluation including catheterization and cardiac MRI will need to be deferred for a time pending diuresis.  Again, we need a \"unifying\"  Diagnosis    I, Brant Napier MD, personally performed the services described as documented by the above named individual. I have made any necessary edits and it is both accurate and complete 1/15/2019  10:11 AM    Electronically signed by Hattie Montoya PA-C, 01/15/19, 8:47 AM.          "

## 2019-01-15 NOTE — PROGRESS NOTES
"Continued Stay Note  [facility]     Patient Name: Smith Craven  MRN: [id number]  Today's Date: 1/15/2019    Admit Date: 1/11/2019    Discharge Plan     Row Name 01/15/19 1506       Plan    Plan  update    Patient/Family in Agreement with Plan  yes    Plan Comments  Spoke with patient and wife at bedside regarding discharge plan.  Patient unsure of when he will be able to go home, \"when they get some of this fluid off\".  No discharge needs verbalized.  CM following.  Patient plan is to discharge home via car with family to transport when medically ready.     Final Discharge Disposition Code  01 - home or self-care        Discharge Codes    No documentation.       Expected Discharge Date and Time     Expected Discharge Date Expected Discharge Time    [place] 15, 2019             Tayla Samano RN    "

## 2019-01-15 NOTE — PROGRESS NOTES
" PULMONARY NOTE     Hospital Day: 4    Mr. Smith Craven, 68 y.o. male is followed for:   Shortness of breath, COPD, and ANN MARIE         SUBJECTIVE     68-year-old male known to me with obstructive sleep apnea on CPAP therapy though with intermittent compliance as well as COPD with mild to moderate airway obstruction.  He now presents with increasing dyspnea.  Right cardiac catheterization reveals elevation of right-sided venous pressures, pulmonary pressures, and diastolic pressures.  A restrictive cardiomyopathy has been theorized.  He does have mitral regurgitation but Dr. Napier does not feel that this alone explains his physiology.    Interval history:    Ongoing diuresis with negative fluid balance.  Subjectively improved.    The patient's relevant past medical, surgical and social history were reviewed and updated in Epic as appropriate.        OBJECTIVE     Vital Sign Min/Max for last 24 hours  Temp  Min: 97.6 °F (36.4 °C)  Max: 98.3 °F (36.8 °C)   BP  Min: 103/55  Max: 118/49   Pulse  Min: 38  Max: 85   Resp  Min: 16  Max: 20   SpO2  Min: 83 %  Max: 100 %   No Data Recorded   Weight  Min: 110 kg (243 lb 6.4 oz)  Max: 110 kg (243 lb 6.4 oz)      Intake/Output Summary (Last 24 hours) at 1/15/2019 1328  Last data filed at 1/15/2019 0936  Gross per 24 hour   Intake 600 ml   Output 1780 ml   Net -1180 ml      Flowsheet Rows      First Filed Value   Admission Height  177.8 cm (70\") Documented at 01/11/2019 0856   Admission Weight  113 kg (249 lb 5.4 oz) Documented at 01/11/2019 0856        Body mass index is 34.92 kg/m².     01/11/19  0856 01/14/19  1217 01/15/19  0557   Weight: 113 kg (249 lb 5.4 oz) 110 kg (243 lb) 110 kg (243 lb 6.4 oz)             Objective:  General Appearance:  In no acute distress.    Vital signs: (most recent): Blood pressure 112/57, pulse 81, temperature 98 °F (36.7 °C), temperature source Oral, resp. rate 16, height 177.8 cm (70\"), weight 110 kg (243 lb 6.4 oz), SpO2 96 %.    HEENT: Normal " HEENT exam.    Lungs:  Normal effort and normal respiratory rate.  He is not in respiratory distress.  There are rales.  No wheezes or rhonchi.    Heart: Normal rate.  Regular rhythm.  S1 normal and S2 normal.  No murmur, gallop or friction rub.   Chest: Symmetric chest wall expansion.   Abdomen: Abdomen is soft and non-distended.  Bowel sounds are normal.   There is no abdominal tenderness.   There is no mass. There is no splenomegaly. There is no hepatomegaly.   Extremities: There is dependent edema.  There is no deformity.    Neurological: Patient is alert and oriented to person, place and time.    Pupils:  Pupils are equal, round, and reactive to light.    Skin:  Warm and dry.                      I reviewed the patient's results and images, including reviewing images and reports.    Medications reviewed.      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  He has splenomegaly but this could be due to his chronically elevated right-sided venous pressures.  A unifying diagnosis such as  amyloidosis has been theorized but has not been proven.          RECOMMENDATIONS     1. Continue current bronchodilators   2. Nocturnal CPAP.  A new machine has been ordered and is pending at his [employer].   3. Supplemental oxygen at night and as needed during the day   4. Continued anticoagulation   5. Ongoing diuresis   6. Will see again as needed while in the hospital.  Will follow as outpatient.         I discussed the patient's findings and my recommendations with patient and family     Yossi Blake MD  Pulmonary and Critical Care Medicine

## 2019-01-15 NOTE — PROGRESS NOTES
"S:still dyspneic otherwise stable      Past medical history, social history and family history was reviewed and unchanged from prior visit.    Review of Systems:    Review of Systems   Constitutional: Negative.    HENT: Negative.    Eyes: Negative.    Respiratory: Positive for shortness of breath.    Cardiovascular: Negative.    Gastrointestinal: Positive for abdominal distention.   Endocrine: Negative.    Genitourinary: Negative.    Musculoskeletal: Negative.    Skin: Negative.    Allergic/Immunologic: Negative.    Neurological: Negative.    Hematological: Negative.    Psychiatric/Behavioral: Negative.       A comprehensive 14 point review of systems was performed and was negative except as mentioned.      Medications:  The current medication list was reviewed in the EMR    ALLERGIES:  No Known Allergies      Physical Exam    VITAL SIGNS:  /49 (BP Location: Left arm, Patient Position: Sitting)   Pulse 63   Temp 97.6 °F (36.4 °C) (Oral)   Resp 16   Ht 177.8 cm (70\")   Wt 110 kg (243 lb 6.4 oz)   SpO2 96%   BMI 34.92 kg/m²   Temp:  [97.6 °F (36.4 °C)-98.3 °F (36.8 °C)] 97.6 °F (36.4 °C)      Performance Status: 1    Physical Exam    General: well appearing, in no acute distress  HEENT: sclera anicteric, oropharynx clear, neck is supple  Lymphatics: no cervical, supraclavicular, or axillary adenopathy  Cardiovascular: regular rate and rhythm, no murmurs, rubs or gallops  Lungs: clear to auscultation bilaterally  Abdomen: soft, nontender, nondistended.  No palpable organomegaly  Extremities: no lower extremity edema  Skin: no rashes, lesions, bruising, or petechiae  Msk:  Shows no weakness of the large muscle groups  Psych: Mood is stable        RECENT LABS:    Lab Results   Component Value Date    HGB 9.5 (L) 01/11/2019    HCT 30.3 (L) 01/11/2019    MCV 98.1 01/11/2019     01/11/2019    WBC 5.61 01/11/2019    NEUTROABS 2.74 12/15/2018    LYMPHSABS 0.90 12/15/2018    MONOSABS 0.35 12/15/2018    " EOSABS 0.06 12/15/2018    BASOSABS 0.00 12/15/2018       Lab Results   Component Value Date    GLUCOSE 112 (H) 01/15/2019    BUN 29 (H) 01/15/2019    CREATININE 1.67 (H) 01/15/2019     01/15/2019    K 3.9 01/15/2019     (H) 01/15/2019    CO2 23.0 01/15/2019    CALCIUM 8.1 (L) 01/15/2019    PROTEINTOT 7.2 01/11/2019    ALBUMIN 4.02 01/11/2019    BILITOT 2.7 (H) 01/11/2019    ALKPHOS 44 01/11/2019    AST 25 01/11/2019    ALT 21 01/11/2019         Assessment/Plan    1. Splenomegaly: likely secondary to vascular congestion from his pulm htn.  Unclear what the cause is.  Amyloid stain on liver biopsy at  was negative. I have given him vaccine in anticipation of possible splenectomy down the line if necessary.  He does not need it at this time.     2. 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            Carlos Wright MD  [facility] Hematology and Oncology    1/15/2019     Please note that portions of this note may have been completed with a voice recognition program. Efforts were made to edit the dictations, but occasionally words are mistranscribed.

## 2019-01-15 NOTE — PLAN OF CARE
Problem: Patient Care Overview  Goal: Plan of Care Review  Outcome: Ongoing (interventions implemented as appropriate)   01/15/19 9186   Coping/Psychosocial   Plan of Care Reviewed With patient;spouse   Plan of Care Review   Progress no change   OTHER   Outcome Summary No acute overnight events. VSS. Continue current POC       Problem: Chronic Obstructive Pulmonary Disease (Adult)  Goal: Signs and Symptoms of Listed Potential Problems Will be Absent, Minimized or Managed (Chronic Obstructive Pulmonary Disease)  Outcome: Ongoing (interventions implemented as appropriate)      Problem: Fall Risk (Adult)  Goal: Identify Related Risk Factors and Signs and Symptoms  Outcome: Ongoing (interventions implemented as appropriate)    Goal: Absence of Fall  Outcome: Ongoing (interventions implemented as appropriate)

## 2019-01-16 LAB
ABO GROUP BLD: NORMAL
ANION GAP SERPL CALCULATED.3IONS-SCNC: 3 MMOL/L (ref 3–11)
BLD GP AB SCN SERPL QL: NEGATIVE
BUN BLD-MCNC: 27 MG/DL (ref 9–23)
BUN/CREAT SERPL: 17 (ref 7–25)
CALCIUM SPEC-SCNC: 8.4 MG/DL (ref 8.7–10.4)
CHLORIDE SERPL-SCNC: 109 MMOL/L (ref 99–109)
CO2 SERPL-SCNC: 24 MMOL/L (ref 20–31)
CREAT BLD-MCNC: 1.59 MG/DL (ref 0.6–1.3)
DEPRECATED RDW RBC AUTO: 70.2 FL (ref 37–54)
ERYTHROCYTE [DISTWIDTH] IN BLOOD BY AUTOMATED COUNT: 19.6 % (ref 11.3–14.5)
GFR SERPL CREATININE-BSD FRML MDRD: 43 ML/MIN/1.73
GLUCOSE BLD-MCNC: 94 MG/DL (ref 70–100)
HCT VFR BLD AUTO: 24.2 % (ref 38.9–50.9)
HGB BLD-MCNC: 7.8 G/DL (ref 13.1–17.5)
MCH RBC QN AUTO: 31.6 PG (ref 27–31)
MCHC RBC AUTO-ENTMCNC: 32.2 G/DL (ref 32–36)
MCV RBC AUTO: 98 FL (ref 80–99)
PLATELET # BLD AUTO: 175 10*3/MM3 (ref 150–450)
PMV BLD AUTO: 8.8 FL (ref 6–12)
POTASSIUM BLD-SCNC: 4.2 MMOL/L (ref 3.5–5.5)
RBC # BLD AUTO: 2.47 10*6/MM3 (ref 4.2–5.76)
RH BLD: POSITIVE
SODIUM BLD-SCNC: 136 MMOL/L (ref 132–146)
T&S EXPIRATION DATE: NORMAL
WBC NRBC COR # BLD: 2.54 10*3/MM3 (ref 3.5–10.8)

## 2019-01-16 PROCEDURE — 99232 SBSQ HOSP IP/OBS MODERATE 35: CPT | Performed by: PHYSICIAN ASSISTANT

## 2019-01-16 PROCEDURE — 85027 COMPLETE CBC AUTOMATED: CPT | Performed by: PHYSICIAN ASSISTANT

## 2019-01-16 PROCEDURE — 25010000002 FUROSEMIDE PER 20 MG: Performed by: INTERNAL MEDICINE

## 2019-01-16 PROCEDURE — 83880 ASSAY OF NATRIURETIC PEPTIDE: CPT | Performed by: INTERNAL MEDICINE

## 2019-01-16 PROCEDURE — P9016 RBC LEUKOCYTES REDUCED: HCPCS

## 2019-01-16 PROCEDURE — 94640 AIRWAY INHALATION TREATMENT: CPT

## 2019-01-16 PROCEDURE — 86901 BLOOD TYPING SEROLOGIC RH(D): CPT | Performed by: INTERNAL MEDICINE

## 2019-01-16 PROCEDURE — 86920 COMPATIBILITY TEST SPIN: CPT

## 2019-01-16 PROCEDURE — 36430 TRANSFUSION BLD/BLD COMPNT: CPT

## 2019-01-16 PROCEDURE — 94799 UNLISTED PULMONARY SVC/PX: CPT

## 2019-01-16 PROCEDURE — 94760 N-INVAS EAR/PLS OXIMETRY 1: CPT

## 2019-01-16 PROCEDURE — 86900 BLOOD TYPING SEROLOGIC ABO: CPT

## 2019-01-16 PROCEDURE — 86900 BLOOD TYPING SEROLOGIC ABO: CPT | Performed by: INTERNAL MEDICINE

## 2019-01-16 PROCEDURE — 86850 RBC ANTIBODY SCREEN: CPT | Performed by: INTERNAL MEDICINE

## 2019-01-16 PROCEDURE — 80048 BASIC METABOLIC PNL TOTAL CA: CPT | Performed by: PHYSICIAN ASSISTANT

## 2019-01-16 RX ADMIN — ALBUTEROL SULFATE 2.5 MG: 2.5 SOLUTION RESPIRATORY (INHALATION) at 12:05

## 2019-01-16 RX ADMIN — POTASSIUM CHLORIDE 20 MEQ: 20 TABLET, EXTENDED RELEASE ORAL at 09:30

## 2019-01-16 RX ADMIN — ALBUTEROL SULFATE 2.5 MG: 2.5 SOLUTION RESPIRATORY (INHALATION) at 00:06

## 2019-01-16 RX ADMIN — SULFAMETHOXAZOLE AND TRIMETHOPRIM 160 MG: 800; 160 TABLET ORAL at 20:32

## 2019-01-16 RX ADMIN — APIXABAN 5 MG: 5 TABLET, FILM COATED ORAL at 20:32

## 2019-01-16 RX ADMIN — FUROSEMIDE 40 MG: 10 INJECTION, SOLUTION INTRAMUSCULAR; INTRAVENOUS at 09:30

## 2019-01-16 RX ADMIN — APIXABAN 5 MG: 5 TABLET, FILM COATED ORAL at 09:30

## 2019-01-16 RX ADMIN — ALBUTEROL SULFATE 2.5 MG: 2.5 SOLUTION RESPIRATORY (INHALATION) at 07:14

## 2019-01-16 RX ADMIN — FUROSEMIDE 40 MG: 10 INJECTION, SOLUTION INTRAMUSCULAR; INTRAVENOUS at 20:32

## 2019-01-16 RX ADMIN — METOPROLOL TARTRATE 25 MG: 25 TABLET ORAL at 09:31

## 2019-01-16 RX ADMIN — SULFAMETHOXAZOLE AND TRIMETHOPRIM 160 MG: 800; 160 TABLET ORAL at 09:30

## 2019-01-16 RX ADMIN — ALBUTEROL SULFATE 2.5 MG: 2.5 SOLUTION RESPIRATORY (INHALATION) at 20:06

## 2019-01-16 NOTE — PLAN OF CARE
Problem: Patient Care Overview  Goal: Plan of Care Review  Outcome: Ongoing (interventions implemented as appropriate)   01/15/19 0455 01/16/19 0358   Coping/Psychosocial   Plan of Care Reviewed With --  patient   Plan of Care Review   Progress --  no change   OTHER   Outcome Summary No acute overnight events. Refused CPAP. VSS. Continue current POC --        Problem: Chronic Obstructive Pulmonary Disease (Adult)  Goal: Signs and Symptoms of Listed Potential Problems Will be Absent, Minimized or Managed (Chronic Obstructive Pulmonary Disease)  Outcome: Ongoing (interventions implemented as appropriate)

## 2019-01-16 NOTE — PLAN OF CARE
Problem: Patient Care Overview  Goal: Plan of Care Review  Outcome: Ongoing (interventions implemented as appropriate)   01/16/19 8553   Coping/Psychosocial   Plan of Care Reviewed With patient   Plan of Care Review   Progress no change; continuing diuresis - has had out 1325 ml urine this shift.  Hgb 7.38 and received 1 unit PRBCs today.  Will have repeat labs.

## 2019-01-16 NOTE — PROGRESS NOTES
Continued Stay Note  [facility]     Patient Name: Smith Craven  MRN: [id number]  Today's Date: 1/16/2019    Admit Date: 1/11/2019    Discharge Plan     Row Name 01/16/19 1512       Plan    Plan  update    Patient/Family in Agreement with Plan  yes    Plan Comments  Spoke with patient at bedside regarding discharge plan.  Patient reports that he does not know when he will be going home but that he is getting blood today.  No needs noted.  CM following.  Patient plan is to discharge home via car with family to transport when medically ready.     Final Discharge Disposition Code  01 - home or self-care        Discharge Codes    No documentation.       Expected Discharge Date and Time     Expected Discharge Date Expected Discharge Time    Jan 17, 2019             Tayla Samano RN

## 2019-01-16 NOTE — PROGRESS NOTES
"  [facility]  PROGRESS NOTE    Date of Admission: 1/11/2019  Length of Stay: 5  Primary Care Physician: Jeane Baird MD    Chief Complaint: f/u class IV dyspnea   Problem List:   1. Cardiac disease, multifactorial:              A. Non-obstructive CAD by cath, January 2008.              B. Atrial fibrillation. CV = 2, on Eliquis              C. Normal LV function.  D. \"moderate to severe\" mitral regurgitation by transesophageal echocardiogram 01/2017  E. \"moderate to severe\" mitral regurgitation by TTE 07/2017, unchanged from previous study   F. TTE, 12/15/18: biatrial enlargement, normal LVEF, mild MR, trivial pericardial effusion, mild PA hypertension  G. RHC 1/11/19: Severe biventricular elevation in diastolic pressures and severe PAH  H. LAUREN 1/11/19: EF 55%, mild-mod MR, LA and RA are dilated, saline test results positive with valsalva for PFO  2. Obesity.  3.  Neurocardiogenic syncope.  4.  Cholelithiasis status post cholecystectomy, summer 2016:                          A. Procedure complicated by pneumonitis and need for blood transfusions.   5.  COPD and ANN MARIE, CPAP compliant:                          A. Followed by Yossi Blake MD.  6.  Chronic lower extremity venous insufficiency.  7. Chronic leukopenia and anemia  8.  Gilbert's syndrome   9.  \"Fatty liver with no cirrhosis or amyloid\" diagnosed by liver biopsy, Summer 2018    10.  Hospitalization, 12/2017: shortness of breath                          A.  Bilateral pleural effusions/pericardial effusion noted.                          B.  CRZ=887, negative troponins                          C.  \"MASSIVE\" spleenomegaly with no important ascites                          D.   Recent O2 for desaturation.  11. Progressive dyspnea Winter 2018              A. Severe biventricular elevation in diastolic pressures by RHC January 2019              B. Severe PAH               C. LAUREN 1/11/19: EF 55%, mild-mod " "MR        Subjective      Patient had some dyspnea last night, however improved this morning. No other complaints. No signs of bleeding       Objective   Vitals: /59 (BP Location: Left arm, Patient Position: Lying)   Pulse 60   Temp 98.1 °F (36.7 °C) (Oral)   Resp 16   Ht 177.8 cm (70\")   Wt 110 kg (243 lb 9.6 oz)   SpO2 98%   BMI 34.95 kg/m²     Physical Exam:  GENERAL: Alert, cooperative, in no acute distress.   HEENT: Normocephalic, no jugular venous distention  HEART: No discrete PMI is noted. Irregular rhythm, slow rate, and no murmurs, gallops, or rubs.   LUNGS: Mild expiratory wheezing, no rales or rhonchi.  ABDOMEN: Soft, bowel sounds present, non-tender   NEUROLOGIC: No focal abnormalities involving strength or sensation are noted.   EXTREMITIES: No clubbing, cyanosis. Left leg 1+ edema with erythema and warmth     Results:  Results from last 7 days   Lab Units 01/16/19  0530 01/11/19  0850   WBC 10*3/mm3 2.54* 5.61   HEMOGLOBIN g/dL 7.8* 9.5*   HEMATOCRIT % 24.2* 30.3*   PLATELETS 10*3/mm3 175 307     Results from last 7 days   Lab Units 01/16/19  0530 01/15/19  0608 01/14/19  0640   SODIUM mmol/L 136 139 138   POTASSIUM mmol/L 4.2 3.9 4.3   CHLORIDE mmol/L 109 110* 111*   CO2 mmol/L 24.0 23.0 24.0   BUN mg/dL 27* 29* 28*   CREATININE mg/dL 1.59* 1.67* 1.66*   GLUCOSE mg/dL 94 112* 97      Lab Results   Component Value Date    CHOL 105 01/11/2019    TRIG 68 01/11/2019    HDL 29 (L) 01/11/2019    LDL 70 01/11/2019    AST 25 01/11/2019    ALT 21 01/11/2019     Results from last 7 days   Lab Units 01/11/19  0850   HEMOGLOBIN A1C % 3.90*     Results from last 7 days   Lab Units 01/11/19  0850   CHOLESTEROL mg/dL 105   TRIGLYCERIDES mg/dL 68   HDL CHOL mg/dL 29*   LDL CHOL mg/dL 70       Intake/Output Summary (Last 24 hours) at 1/16/2019 0824  Last data filed at 1/16/2019 0747  Gross per 24 hour   Intake 360 ml   Output 3050 ml   Net -2690 ml     I personally reviewed the patient's EKG/Telemetry " data    Radiology Data:   CXR 1/14/19:  IMPRESSION:  Small bilateral pleural effusions with mild increased  markings identified left lung base. No acute parenchymal disease.    Current Medications:    albuterol 2.5 mg Nebulization Q6H - RT   apixaban 5 mg Oral Q12H   fentaNYL citrate (PF)      Fluticasone Furoate-Vilanterol 1 puff Inhalation Daily   furosemide 40 mg Intravenous Q12H   metoprolol tartrate 25 mg Oral Q12H   midazolam      midazolam      O2 2 L/min Inhalation Q24H   potassium chloride 20 mEq Oral Daily   sulfamethoxazole-trimethoprim 1 tablet Oral Q12H          Assessment and Plan:     1. Progressive class IV dyspnea  - elevated biventricular pressures with severe PAH by RHC   - LAUREN with normal LVSF and mild-mod MR  - diuresing with IV Lasix 40mg BID (net -14L since admission)   - will need to exclude restrictive cardiomyopathy (See Dr. Napier' note from 1/11)   - appreciate pulmonary and Heme/Onc input   - amyloid stain negative on liver biopsy from  per Dr. Wright  - better after diuresis since admission in terms findings, O2 requirements, etc.      2. CARA   - Cr remains stable and slightly improved this morning (1.67-->1.59)   - continue to monitor closely with diuresis     3. Afib  - continue Eliquis, rate is controlled     4. Left leg cellulitis  - seems improved   - Not sure how much longer he needs antibiotics.         5. Anemia      - likely multifactorial      - per Dr. Hankins; transfusion today.    I, Brant Napier MD, [facility], [facility], personally performed the services described in this documentation as scribed by the above named individual in my presence, and it is both accurate and complete. At 6:43 PM on 01/10/2019    Electronically signed by Hattie Montoya PA-C, 01/16/19, 8:39 AM.

## 2019-01-17 LAB
ABO + RH BLD: NORMAL
ALBUMIN SERPL-MCNC: 3.59 G/DL (ref 3.2–4.8)
ALBUMIN/GLOB SERPL: 1.4 G/DL (ref 1.5–2.5)
ALP SERPL-CCNC: 36 U/L (ref 25–100)
ALT SERPL W P-5'-P-CCNC: 14 U/L (ref 7–40)
ANION GAP SERPL CALCULATED.3IONS-SCNC: 6 MMOL/L (ref 3–11)
AST SERPL-CCNC: 19 U/L (ref 0–33)
BASOPHILS # BLD AUTO: 0.01 10*3/MM3 (ref 0–0.2)
BASOPHILS NFR BLD AUTO: 0.4 % (ref 0–1)
BH BB BLOOD EXPIRATION DATE: NORMAL
BH BB BLOOD TYPE BARCODE: 6200
BH BB DISPENSE STATUS: NORMAL
BH BB PRODUCT CODE: NORMAL
BH BB UNIT NUMBER: NORMAL
BILIRUB SERPL-MCNC: 2.5 MG/DL (ref 0.3–1.2)
BNP SERPL-MCNC: 348 PG/ML (ref 0–100)
BUN BLD-MCNC: 25 MG/DL (ref 9–23)
BUN/CREAT SERPL: 16 (ref 7–25)
CALCIUM SPEC-SCNC: 8.3 MG/DL (ref 8.7–10.4)
CHLORIDE SERPL-SCNC: 109 MMOL/L (ref 99–109)
CO2 SERPL-SCNC: 23 MMOL/L (ref 20–31)
CREAT BLD-MCNC: 1.56 MG/DL (ref 0.6–1.3)
CROSSMATCH INTERPRETATION: NORMAL
DAT POLY-SP REAG RBC QL: NEGATIVE
DEPRECATED RDW RBC AUTO: 73 FL (ref 37–54)
EOSINOPHIL # BLD AUTO: 0.02 10*3/MM3 (ref 0–0.3)
EOSINOPHIL NFR BLD AUTO: 0.8 % (ref 0–3)
ERYTHROCYTE [DISTWIDTH] IN BLOOD BY AUTOMATED COUNT: 20.9 % (ref 11.3–14.5)
GFR SERPL CREATININE-BSD FRML MDRD: 44 ML/MIN/1.73
GLOBULIN UR ELPH-MCNC: 2.6 GM/DL
GLUCOSE BLD-MCNC: 99 MG/DL (ref 70–100)
HCT VFR BLD AUTO: 26.1 % (ref 38.9–50.9)
HGB BLD-MCNC: 8.3 G/DL (ref 13.1–17.5)
IMM GRANULOCYTES # BLD AUTO: 0.01 10*3/MM3 (ref 0–0.03)
IMM GRANULOCYTES NFR BLD AUTO: 0.4 % (ref 0–0.6)
LYMPHOCYTES # BLD AUTO: 0.52 10*3/MM3 (ref 0.6–4.8)
LYMPHOCYTES NFR BLD AUTO: 21.7 % (ref 24–44)
MCH RBC QN AUTO: 30.4 PG (ref 27–31)
MCHC RBC AUTO-ENTMCNC: 31.8 G/DL (ref 32–36)
MCV RBC AUTO: 95.6 FL (ref 80–99)
MONOCYTES # BLD AUTO: 0.22 10*3/MM3 (ref 0–1)
MONOCYTES NFR BLD AUTO: 9.2 % (ref 0–12)
NEUTROPHILS # BLD AUTO: 1.63 10*3/MM3 (ref 1.5–8.3)
NEUTROPHILS NFR BLD AUTO: 67.9 % (ref 41–71)
PLATELET # BLD AUTO: 192 10*3/MM3 (ref 150–450)
PMV BLD AUTO: 9.1 FL (ref 6–12)
POTASSIUM BLD-SCNC: 4.1 MMOL/L (ref 3.5–5.5)
PROT SERPL-MCNC: 6.2 G/DL (ref 5.7–8.2)
RBC # BLD AUTO: 2.73 10*6/MM3 (ref 4.2–5.76)
SODIUM BLD-SCNC: 138 MMOL/L (ref 132–146)
UNIT  ABO: NORMAL
UNIT  RH: NORMAL
WBC NRBC COR # BLD: 2.4 10*3/MM3 (ref 3.5–10.8)

## 2019-01-17 PROCEDURE — 94799 UNLISTED PULMONARY SVC/PX: CPT

## 2019-01-17 PROCEDURE — 94640 AIRWAY INHALATION TREATMENT: CPT

## 2019-01-17 PROCEDURE — 99232 SBSQ HOSP IP/OBS MODERATE 35: CPT | Performed by: INTERNAL MEDICINE

## 2019-01-17 PROCEDURE — 85025 COMPLETE CBC W/AUTO DIFF WBC: CPT | Performed by: INTERNAL MEDICINE

## 2019-01-17 PROCEDURE — 94760 N-INVAS EAR/PLS OXIMETRY 1: CPT

## 2019-01-17 PROCEDURE — 25010000002 FUROSEMIDE PER 20 MG: Performed by: INTERNAL MEDICINE

## 2019-01-17 PROCEDURE — 80053 COMPREHEN METABOLIC PANEL: CPT | Performed by: INTERNAL MEDICINE

## 2019-01-17 PROCEDURE — 86880 COOMBS TEST DIRECT: CPT | Performed by: INTERNAL MEDICINE

## 2019-01-17 PROCEDURE — 83010 ASSAY OF HAPTOGLOBIN QUANT: CPT | Performed by: INTERNAL MEDICINE

## 2019-01-17 RX ORDER — FUROSEMIDE 40 MG/1
40 TABLET ORAL
Status: DISCONTINUED | OUTPATIENT
Start: 2019-01-18 | End: 2019-01-22 | Stop reason: HOSPADM

## 2019-01-17 RX ORDER — EPLERENONE 25 MG/1
25 TABLET, FILM COATED ORAL
Status: DISCONTINUED | OUTPATIENT
Start: 2019-01-18 | End: 2019-01-22 | Stop reason: HOSPADM

## 2019-01-17 RX ADMIN — SULFAMETHOXAZOLE AND TRIMETHOPRIM 160 MG: 800; 160 TABLET ORAL at 08:26

## 2019-01-17 RX ADMIN — METOPROLOL TARTRATE 25 MG: 25 TABLET ORAL at 08:25

## 2019-01-17 RX ADMIN — FUROSEMIDE 40 MG: 10 INJECTION, SOLUTION INTRAMUSCULAR; INTRAVENOUS at 08:26

## 2019-01-17 RX ADMIN — METOPROLOL TARTRATE 25 MG: 25 TABLET ORAL at 20:07

## 2019-01-17 RX ADMIN — SULFAMETHOXAZOLE AND TRIMETHOPRIM 160 MG: 800; 160 TABLET ORAL at 20:07

## 2019-01-17 RX ADMIN — ALBUTEROL SULFATE 2.5 MG: 2.5 SOLUTION RESPIRATORY (INHALATION) at 20:49

## 2019-01-17 RX ADMIN — APIXABAN 5 MG: 5 TABLET, FILM COATED ORAL at 08:26

## 2019-01-17 RX ADMIN — ALBUTEROL SULFATE 2.5 MG: 2.5 SOLUTION RESPIRATORY (INHALATION) at 12:30

## 2019-01-17 RX ADMIN — ALBUTEROL SULFATE 2.5 MG: 2.5 SOLUTION RESPIRATORY (INHALATION) at 07:17

## 2019-01-17 RX ADMIN — POTASSIUM CHLORIDE 20 MEQ: 20 TABLET, EXTENDED RELEASE ORAL at 08:26

## 2019-01-17 RX ADMIN — ALBUTEROL SULFATE 2.5 MG: 2.5 SOLUTION RESPIRATORY (INHALATION) at 00:54

## 2019-01-17 RX ADMIN — APIXABAN 5 MG: 5 TABLET, FILM COATED ORAL at 20:07

## 2019-01-17 NOTE — PROGRESS NOTES
"  [facility] at [facility]  PROGRESS NOTE    Date of Admission: 1/11/2019  Length of Stay: 6  Primary Care Physician: Jeane Baird MD    Chief Complaint: f/u class IV dyspnea   Problem List:   1. Cardiac disease, multifactorial:              A. Non-obstructive CAD by cath, January 2008.              B. Atrial fibrillation. CV = 2, on Eliquis              C. Normal LV function.  D. \"moderate to severe\" mitral regurgitation by transesophageal echocardiogram 01/2017  E. \"moderate to severe\" mitral regurgitation by TTE 07/2017, unchanged from previous study   F. TTE, 12/15/18: biatrial enlargement, normal LVEF, mild MR, trivial pericardial effusion, mild PA hypertension  G. RHC 1/11/19: Severe biventricular elevation in diastolic pressures and severe PAH  H. LAUREN 1/11/19: EF 55%, mild-mod MR, LA and RA are dilated, saline test results positive with valsalva for PFO  2. Obesity.  3.  Neurocardiogenic syncope.  4.  Cholelithiasis status post cholecystectomy, summer 2016:                          A. Procedure complicated by pneumonitis and need for blood transfusions.   5.  COPD and ANN MARIE, CPAP compliant:                          A. Followed by Yossi Blake MD.  6.  Chronic lower extremity venous insufficiency.  7. Chronic leukopenia and anemia  8.  Gilbert's syndrome   9.  \"Fatty liver with no cirrhosis or amyloid\" diagnosed by liver biopsy, Summer 2018    10.  Hospitalization, 12/2017: shortness of breath                          A.  Bilateral pleural effusions/pericardial effusion noted.                          B.  GYR=001, negative troponins                          C.  \"MASSIVE\" spleenomegaly with no important ascites                          D.   Recent O2 for desaturation.  11. Progressive dyspnea Winter 2018              A. Severe biventricular elevation in diastolic pressures by RHC January 2019              B. Severe PAH               C. LAUREN 1/11/19: EF 55%, mild-mod " "MR       Subjective      Patient feels well, about the same. No specific complaints.       Objective   Vitals: /75   Pulse 64   Temp 97.9 °F (36.6 °C) (Oral)   Resp 18   Ht 177.8 cm (70\")   Wt 111 kg (244 lb 3.2 oz)   SpO2 90%   BMI 35.04 kg/m²     Physical Exam:  GENERAL: Alert, cooperative, in no acute distress.   HEENT: Normocephalic, no jugular venous distention  HEART: No discrete PMI is noted. Regular rhythm, normal rate, and no murmurs, gallops, or rubs.   LUNGS: Clear to auscultation bilaterally. No wheezing, rales or rhonchi.  ABDOMEN: Soft, bowel sounds present, non-tender   NEUROLOGIC: No focal abnormalities involving strength or sensation are noted.   EXTREMITIES: No clubbing, cyanosis. 1+ edema in LLE, and mild erythema     Results:  Results from last 7 days   Lab Units 01/17/19  0548 01/16/19  0530 01/11/19  0850   WBC 10*3/mm3 2.40* 2.54* 5.61   HEMOGLOBIN g/dL 8.3* 7.8* 9.5*   HEMATOCRIT % 26.1* 24.2* 30.3*   PLATELETS 10*3/mm3 192 175 307     Results from last 7 days   Lab Units 01/17/19  0548 01/16/19  0530 01/15/19  0608   SODIUM mmol/L 138 136 139   POTASSIUM mmol/L 4.1 4.2 3.9   CHLORIDE mmol/L 109 109 110*   CO2 mmol/L 23.0 24.0 23.0   BUN mg/dL 25* 27* 29*   CREATININE mg/dL 1.56* 1.59* 1.67*   GLUCOSE mg/dL 99 94 112*      Lab Results   Component Value Date    CHOL 105 01/11/2019    TRIG 68 01/11/2019    HDL 29 (L) 01/11/2019    LDL 70 01/11/2019    AST 19 01/17/2019    ALT 14 01/17/2019     Results from last 7 days   Lab Units 01/11/19  0850   HEMOGLOBIN A1C % 3.90*     Results from last 7 days   Lab Units 01/11/19  0850   CHOLESTEROL mg/dL 105   TRIGLYCERIDES mg/dL 68   HDL CHOL mg/dL 29*   LDL CHOL mg/dL 70         Results from last 7 days   Lab Units 01/16/19  2309   BNP pg/mL 348.0*       Intake/Output Summary (Last 24 hours) at 1/17/2019 0924  Last data filed at 1/17/2019 0635  Gross per 24 hour   Intake 840 ml   Output 3275 ml   Net -2435 ml     I personally reviewed the " "patient's EKG/Telemetry data    Current Medications:    albuterol 2.5 mg Nebulization Q6H - RT   apixaban 5 mg Oral Q12H   fentaNYL citrate (PF)      Fluticasone Furoate-Vilanterol 1 puff Inhalation Daily   furosemide 40 mg Intravenous Q12H   metoprolol tartrate 25 mg Oral Q12H   midazolam      midazolam      O2 2 L/min Inhalation Q24H   potassium chloride 20 mEq Oral Daily   sulfamethoxazole-trimethoprim 1 tablet Oral Q12H          Assessment and Plan:     1. Progressive class IV dyspnea  - elevated biventricular pressures with severe PAH by RHC   - LAUREN with normal LVSF and mild-mod MR  - diuresing with IV Lasix 40mg BID (net -14L since admission)   - will need to exclude restrictive cardiomyopathy (See Dr. Napier' note from 1/11)   - appreciate pulmonary and Heme/Onc input   - amyloid stain negative on liver biopsy from  per Dr. Wright  - 1/17/19 CXR and BNP better     2. CARA   - Cr remains stable   - continue to monitor closely with diuresis     3. Afib  - continue Eliquis, rate is controlled     4. Left leg cellulitis  - seems improved          5. Anemia      - likely multifactorial      - s/p 1 unit PRBC's 1/16       - per Dr. Hankins     At this point I'll change go po diuretics and try to \"wrap things up\" anticipating referral to [facility] or [facility].    I, Brant Napier MD, personally performed the services described as documented by the above named individual. I have made any necessary edits and it is both accurate and complete 1/17/2019  6:48 PM    Electronically signed by Hattie Montyoa PA-C, 01/17/19, 9:39 AM.          "

## 2019-01-17 NOTE — PROGRESS NOTES
Continued Stay Note  [facility]     Patient Name: Smith Craven  MRN: [id number]  Today's Date: 1/17/2019    Admit Date: 1/11/2019    Discharge Plan     Row Name 01/17/19 1147       Plan    Plan  update    Patient/Family in Agreement with Plan  yes    Plan Comments  Spoke with patient at bedside regarding discharge plan.  Patient sitting in chair talking with visitor and indicates that he does not know when he will get to go home.  No discharge needs verbalized at this time.  CM following.  Patient plan is to discharge home via car with family to transport when medically ready.     Final Discharge Disposition Code  01 - home or self-care        Discharge Codes    No documentation.       Expected Discharge Date and Time     Expected Discharge Date Expected Discharge Time    Jan 17, 2019             Tayla Samano, RN

## 2019-01-17 NOTE — PLAN OF CARE
Problem: Patient Care Overview  Goal: Plan of Care Review  Outcome: Ongoing (interventions implemented as appropriate)   01/17/19 3569   Coping/Psychosocial   Plan of Care Reviewed With patient   Plan of Care Review   Progress no change;  hgb above 8 this a.m.  Continuing diuresis, transitioning to PO lasix;  Urinary output 1550 ml since 0630 this morning.

## 2019-01-17 NOTE — PLAN OF CARE
Problem: Patient Care Overview  Goal: Plan of Care Review  Outcome: Ongoing (interventions implemented as appropriate)   01/17/19 0056   Coping/Psychosocial   Plan of Care Reviewed With patient   Plan of Care Review   Progress improving       Problem: Chronic Obstructive Pulmonary Disease (Adult)  Goal: Signs and Symptoms of Listed Potential Problems Will be Absent, Minimized or Managed (Chronic Obstructive Pulmonary Disease)  Outcome: Ongoing (interventions implemented as appropriate)   01/17/19 0056   Goal/Outcome Evaluation   Problems Assessed (Chronic Obstructive Pulmonary Disease (COPD)) all   Problems Present (COPD, Bronch/Emphy) respiratory compromise;functional deficit       Problem: Fall Risk (Adult)  Goal: Identify Related Risk Factors and Signs and Symptoms  Outcome: Ongoing (interventions implemented as appropriate)   01/17/19 0056   Fall Risk (Adult)   Related Risk Factors (Fall Risk) age-related changes;fatigue/slow reaction;gait/mobility problems;environment unfamiliar   Signs and Symptoms (Fall Risk) presence of risk factors     Goal: Absence of Fall  Outcome: Ongoing (interventions implemented as appropriate)   01/17/19 0056   Fall Risk (Adult)   Absence of Fall making progress toward outcome

## 2019-01-18 LAB
ANION GAP SERPL CALCULATED.3IONS-SCNC: 5 MMOL/L (ref 3–11)
BUN BLD-MCNC: 24 MG/DL (ref 9–23)
BUN/CREAT SERPL: 16.1 (ref 7–25)
CALCIUM SPEC-SCNC: 8.3 MG/DL (ref 8.7–10.4)
CHLORIDE SERPL-SCNC: 110 MMOL/L (ref 99–109)
CO2 SERPL-SCNC: 23 MMOL/L (ref 20–31)
CREAT BLD-MCNC: 1.49 MG/DL (ref 0.6–1.3)
GFR SERPL CREATININE-BSD FRML MDRD: 47 ML/MIN/1.73
GLUCOSE BLD-MCNC: 94 MG/DL (ref 70–100)
HAPTOGLOB SERPL-MCNC: <10 MG/DL (ref 34–200)
POTASSIUM BLD-SCNC: 4.4 MMOL/L (ref 3.5–5.5)
SODIUM BLD-SCNC: 138 MMOL/L (ref 132–146)

## 2019-01-18 PROCEDURE — 94799 UNLISTED PULMONARY SVC/PX: CPT

## 2019-01-18 PROCEDURE — 99232 SBSQ HOSP IP/OBS MODERATE 35: CPT | Performed by: INTERNAL MEDICINE

## 2019-01-18 PROCEDURE — 80048 BASIC METABOLIC PNL TOTAL CA: CPT | Performed by: PHYSICIAN ASSISTANT

## 2019-01-18 PROCEDURE — 94760 N-INVAS EAR/PLS OXIMETRY 1: CPT

## 2019-01-18 RX ORDER — ALBUTEROL SULFATE 2.5 MG/3ML
2.5 SOLUTION RESPIRATORY (INHALATION) EVERY 6 HOURS PRN
Status: DISCONTINUED | OUTPATIENT
Start: 2019-01-18 | End: 2019-01-22 | Stop reason: HOSPADM

## 2019-01-18 RX ADMIN — FUROSEMIDE 40 MG: 40 TABLET ORAL at 17:44

## 2019-01-18 RX ADMIN — SULFAMETHOXAZOLE AND TRIMETHOPRIM 160 MG: 800; 160 TABLET ORAL at 09:22

## 2019-01-18 RX ADMIN — APIXABAN 5 MG: 5 TABLET, FILM COATED ORAL at 09:22

## 2019-01-18 RX ADMIN — METOPROLOL TARTRATE 25 MG: 25 TABLET ORAL at 09:22

## 2019-01-18 RX ADMIN — APIXABAN 5 MG: 5 TABLET, FILM COATED ORAL at 20:56

## 2019-01-18 RX ADMIN — FUROSEMIDE 40 MG: 40 TABLET ORAL at 09:22

## 2019-01-18 RX ADMIN — EPLERENONE 25 MG: 25 TABLET ORAL at 09:22

## 2019-01-18 RX ADMIN — ALBUTEROL SULFATE 2.5 MG: 2.5 SOLUTION RESPIRATORY (INHALATION) at 00:57

## 2019-01-18 NOTE — PROGRESS NOTES
Continued Stay Note  [facility]     Patient Name: Smith Craven  MRN: [id number]  Today's Date: 1/18/2019    Admit Date: 1/11/2019    Discharge Plan     Row Name 01/18/19 1344       Plan    Plan  update    Patient/Family in Agreement with Plan  yes    Plan Comments  Spoke with patient at bedside regarding discharge plan.  Patient unsure of when he will be ready for discharge, hoping soon.  No discharge needs noted.  CM following.  Patient plan is to discharge home via car with family to transport when medically ready.     Final Discharge Disposition Code  01 - home or self-care        Discharge Codes    No documentation.       Expected Discharge Date and Time     Expected Discharge Date Expected Discharge Time    Jan 18, 2019             Tayla Samano RN

## 2019-01-18 NOTE — PROGRESS NOTES
Adult Nutrition  Assessment/PES    Patient Name:  Smith Craven  YOB: 1950  MRN: [id number]  Admit Date:  1/11/2019    Assessment Date:  1/18/2019      Reason for Assessment     Row Name 01/18/19 1228          Reason for Assessment    Reason For Assessment  -- LOS; 30 mins     Diagnosis  -- progressively severe class IV dyspnea, CARA, Afib, L leg cellulitis; h/o COPD, cholecysitits and s/p cholecystectomy, Gilbert's syndrome, CAD.  Complete list of dx per MD notes            Anthropometrics     Row Name 01/18/19 1229 01/18/19 0600       Anthropometrics    Weight  -- ht=70in, uu=952xn; BMI=35.3         Labs/Tests/Procedures/Meds     Row Name 01/18/19 1230          Labs/Procedures/Meds    Lab Results Reviewed  reviewed             Nutrition Prescription Ordered     Row Name 01/18/19 1230          Nutrition Prescription PO    Current PO Diet  Regular     Common Modifiers  Cardiac         Evaluation of Received Nutrient/Fluid Intake     Row Name 01/18/19 1230          PO Evaluation    Number of Meals  6     % PO Intake  92               Problem/Interventions:  Problem 1     Row Name 01/18/19 1230          Nutrition Diagnoses Problem 1    Problem 1  Nutrition Appropriate for Condition at this Time     Etiology (related to)  MNT for Treatment/Condition     Signs/Symptoms (evidenced by)  PO Intake     Percent (%) intake recorded  92 %     Over number of meals  6                 Intervention Goal     Row Name 01/18/19 1230          Intervention Goal    PO  Maintain intake         Nutrition Intervention     Row Name 01/18/19 1230          Nutrition Intervention    RD/Tech Action  Follow Tx progress           Education/Evaluation     Row Name 01/18/19 1230          Monitor/Evaluation    Monitor  Per protocol; noted plans for referral to [facility] or [facility]           Electronically signed by:  Jacinda Justice MS,RD,LD  01/18/19 12:31 PM   Subjective:      Benny Rodriguez II is a 27 y.o. male who presents with Pharyngitis            Pharyngitis   This is a new problem. The current episode started yesterday. The problem has been unchanged. Neither side of throat is experiencing more pain than the other. There has been no fever. The pain is moderate. Associated symptoms include swollen glands and trouble swallowing. Pertinent negatives include no coughing. He has had no exposure to strep or mono. He has tried nothing for the symptoms. The treatment provided no relief.       Review of Systems   Constitutional: Negative.    HENT: Positive for sore throat and trouble swallowing.    Eyes: Negative.    Respiratory: Negative.  Negative for cough.    Skin: Negative.           Objective:     /86 mmHg  Pulse 76  Temp(Src) 36.9 °C (98.4 °F)  Resp 16  SpO2 98%     Physical Exam   Constitutional: He is oriented to person, place, and time. He appears well-developed and well-nourished. No distress.   HENT:   Head: Normocephalic and atraumatic.   +phar./tons redn     Eyes: EOM are normal. Pupils are equal, round, and reactive to light.   Neck: Normal range of motion. Neck supple.   Cardiovascular: Normal rate.    Pulmonary/Chest: Effort normal. No respiratory distress.   Lymphadenopathy:     He has cervical adenopathy.   Neurological: He is alert and oriented to person, place, and time.   Skin: Skin is warm and dry.   Psychiatric: He has a normal mood and affect. His behavior is normal. Judgment and thought content normal.   Nursing note and vitals reviewed.    Filed Vitals:    03/01/17 1819   BP: 140/86   Pulse: 76   Temp: 36.9 °C (98.4 °F)   Resp: 16   SpO2: 98%     Active Ambulatory Problems     Diagnosis Date Noted   • No Active Ambulatory Problems     Resolved Ambulatory Problems     Diagnosis Date Noted   • No Resolved Ambulatory Problems     No Additional Past Medical History     Current Outpatient Prescriptions on File Prior to Visit    Medication Sig Dispense Refill   • meclizine (ANTIVERT) 25 MG Tab 0.5 TO 1 TAB EVERY 6 HOURS ONLY IF NEEDED FOR DIZZINESS, NAUSEA, OR VOMITING. MAY CAUSE DROWSINESS. 30 Tab 2   • ibuprofen (MOTRIN) 800 MG Tab Take 1 Tab by mouth every 8 hours as needed. 30 Tab 3     No current facility-administered medications on file prior to visit.     Gargles, Cepacol lozenges, Aleve/Advil as needed for throat pain  History reviewed. No pertinent family history.  Review of patient's allergies indicates no known allergies.         rst ng     Assessment/Plan:     ·  tonsillitis      Gargles, Cepacol lozenges, Aleve/Advil as needed for throat pain; rx abx prn sx persist; Return to clinic 3-5 days if symptoms persist or worsen or follow up with Primary Doctor

## 2019-01-18 NOTE — PLAN OF CARE
Problem: Patient Care Overview  Goal: Plan of Care Review  Outcome: Ongoing (interventions implemented as appropriate)   01/18/19 0401   Coping/Psychosocial   Plan of Care Reviewed With patient   Plan of Care Review   Progress improving       Problem: Chronic Obstructive Pulmonary Disease (Adult)  Goal: Signs and Symptoms of Listed Potential Problems Will be Absent, Minimized or Managed (Chronic Obstructive Pulmonary Disease)  Outcome: Ongoing (interventions implemented as appropriate)   01/18/19 0401   Goal/Outcome Evaluation   Problems Assessed (Chronic Obstructive Pulmonary Disease (COPD)) all   Problems Present (COPD, Bronch/Emphy) respiratory compromise       Problem: Fall Risk (Adult)  Goal: Identify Related Risk Factors and Signs and Symptoms  Outcome: Ongoing (interventions implemented as appropriate)   01/18/19 0401   Fall Risk (Adult)   Related Risk Factors (Fall Risk) age-related changes;fatigue/slow reaction;environment unfamiliar   Signs and Symptoms (Fall Risk) presence of risk factors     Goal: Absence of Fall  Outcome: Ongoing (interventions implemented as appropriate)   01/18/19 0401   Fall Risk (Adult)   Absence of Fall making progress toward outcome

## 2019-01-18 NOTE — PLAN OF CARE
Problem: Patient Care Overview  Goal: Plan of Care Review  Outcome: Ongoing (interventions implemented as appropriate)   01/18/19 8630   Coping/Psychosocial   Plan of Care Reviewed With patient   Plan of Care Review   Progress no change;  Continuing to diurese well with 1300 ml output during this shift;  Asymptomatic bradycardia at times.

## 2019-01-18 NOTE — PROGRESS NOTES
"  [facility] at [facility]  PROGRESS NOTE    Date of Admission: 1/11/2019  Length of Stay: 7  Primary Care Physician: Jeane Baird MD    Chief Complaint: f/u class IV dyspnea   Problem List:   1. Cardiac disease, multifactorial:              A. Non-obstructive CAD by cath, January 2008.              B. Atrial fibrillation. CV = 2, on Eliquis              C. Normal LV function.  D. \"moderate to severe\" mitral regurgitation by transesophageal echocardiogram 01/2017  E. \"moderate to severe\" mitral regurgitation by TTE 07/2017, unchanged from previous study   F. TTE, 12/15/18: biatrial enlargement, normal LVEF, mild MR, trivial pericardial effusion, mild PA hypertension  G. RHC 1/11/19: Severe biventricular elevation in diastolic pressures and severe PAH  H. LAUREN 1/11/19: EF 55%, mild-mod MR, LA and RA are dilated, saline test results positive with valsalva for PFO  2. Obesity.  3.  Neurocardiogenic syncope.  4.  Cholelithiasis status post cholecystectomy, summer 2016:                          A. Procedure complicated by pneumonitis and need for blood transfusions.   5.  COPD and ANN MARIE, CPAP compliant:                          A. Followed by Yossi Blake MD.  6.  Chronic lower extremity venous insufficiency.  7. Chronic leukopenia and anemia  8.  Gilbert's syndrome   9.  \"Fatty liver with no cirrhosis or amyloid\" diagnosed by liver biopsy, Summer 2018    10.  Hospitalization, 12/2017: shortness of breath                          A.  Bilateral pleural effusions/pericardial effusion noted.                          B.  GRJ=325, negative troponins                          C.  \"MASSIVE\" spleenomegaly with no important ascites                          D.   Recent O2 for desaturation.  11. Progressive dyspnea Winter 2018              A. Severe biventricular elevation in diastolic pressures by RHC January 2019              B. Severe PAH               C. LAUREN 1/11/19: EF 55%, mild-mod " "MR     Subjective      Patient feels a little more short of breath today       Objective   Vitals: /58 (BP Location: Left arm, Patient Position: Sitting)   Pulse 57   Temp 97.7 °F (36.5 °C) (Oral)   Resp 16   Ht 177.8 cm (70\")   Wt 112 kg (246 lb)   SpO2 (!) 79%   BMI 35.30 kg/m²     Physical Exam:  GENERAL: Alert, cooperative, in no acute distress.   HEENT: Normocephalic, no jugular venous distention  HEART: No discrete PMI is noted. Irregular rhythm, normal rate, and no murmurs, gallops, or rubs.   LUNGS: Clear to auscultation bilaterally. No wheezing, rales or rhonchi.  ABDOMEN: Soft, bowel sounds present, non-tender   NEUROLOGIC: No focal abnormalities involving strength or sensation are noted.   EXTREMITIES: No clubbing, cyanosis. 1+ LLE with mild erythema and warmth     Results:  Results from last 7 days   Lab Units 01/17/19  0548 01/16/19  0530   WBC 10*3/mm3 2.40* 2.54*   HEMOGLOBIN g/dL 8.3* 7.8*   HEMATOCRIT % 26.1* 24.2*   PLATELETS 10*3/mm3 192 175     Results from last 7 days   Lab Units 01/18/19  0554 01/17/19  0548 01/16/19  0530   SODIUM mmol/L 138 138 136   POTASSIUM mmol/L 4.4 4.1 4.2   CHLORIDE mmol/L 110* 109 109   CO2 mmol/L 23.0 23.0 24.0   BUN mg/dL 24* 25* 27*   CREATININE mg/dL 1.49* 1.56* 1.59*   GLUCOSE mg/dL 94 99 94      Lab Results   Component Value Date    CHOL 105 01/11/2019    TRIG 68 01/11/2019    HDL 29 (L) 01/11/2019    LDL 70 01/11/2019    AST 19 01/17/2019    ALT 14 01/17/2019       Results from last 7 days   Lab Units 01/16/19  2309   BNP pg/mL 348.0*       Intake/Output Summary (Last 24 hours) at 1/18/2019 1651  Last data filed at 1/18/2019 1330  Gross per 24 hour   Intake 480 ml   Output 2250 ml   Net -1770 ml     I personally reviewed the patient's EKG/Telemetry data    Current Medications:    apixaban 5 mg Oral Q12H   eplerenone 25 mg Oral Q24H   fentaNYL citrate (PF)      Fluticasone Furoate-Vilanterol 1 puff Inhalation Daily   furosemide 40 mg Oral BID "   metoprolol tartrate 25 mg Oral Q12H   midazolam      midazolam      O2 2 L/min Inhalation Q24H          Assessment and Plan:     1. Progressive class IV dyspnea  - elevated biventricular pressures with severe PAH by RHC   - LAUREN with normal LVSF and mild-mod MR  - diuresed well with IV Lasix  (net -18L since admission)   - will need to exclude restrictive cardiomyopathy (See Dr. Napier' note from 1/11)   - appreciate pulmonary and Heme/Onc input   - amyloid stain negative on liver biopsy from  per Dr. Wright  - 1/17/19 CXR and BNP better  - now on PO Lasix 40mg BID and Spironolactone; still diuresing but not completely comfortable.  - will keep until at least Monday     2. CARA   - Cr improved to 1.49 this AM  - continue to monitor closely with diuresis     3. Afib  - continue Eliquis, rate is controlled     4. Left leg cellulitis  - seems improved; I'll not renew antibiotics          5. Anemia      - likely multifactorial      - s/p 1 unit PRBC's 1/16                  - per Dr. Cr DOBBS, Brant Napier MD, FAC, [facility], personally performed the services described in this documentation as scribed by the above named individual in my presence, and it is both accurate and complete. At 6:06 PM on 01/10/2019    Electronically signed by Hattie Montoya PA-C, 01/18/19, 4:52 PM.

## 2019-01-19 LAB
ANION GAP SERPL CALCULATED.3IONS-SCNC: 4 MMOL/L (ref 3–11)
BUN BLD-MCNC: 21 MG/DL (ref 9–23)
BUN/CREAT SERPL: 14.6 (ref 7–25)
CALCIUM SPEC-SCNC: 8.5 MG/DL (ref 8.7–10.4)
CHLORIDE SERPL-SCNC: 110 MMOL/L (ref 99–109)
CO2 SERPL-SCNC: 24 MMOL/L (ref 20–31)
CREAT BLD-MCNC: 1.44 MG/DL (ref 0.6–1.3)
DEPRECATED RDW RBC AUTO: 71.7 FL (ref 37–54)
ERYTHROCYTE [DISTWIDTH] IN BLOOD BY AUTOMATED COUNT: 20 % (ref 11.3–14.5)
GFR SERPL CREATININE-BSD FRML MDRD: 48 ML/MIN/1.73
GLUCOSE BLD-MCNC: 89 MG/DL (ref 70–100)
HCT VFR BLD AUTO: 28.2 % (ref 38.9–50.9)
HGB BLD-MCNC: 9 G/DL (ref 13.1–17.5)
MCH RBC QN AUTO: 31.5 PG (ref 27–31)
MCHC RBC AUTO-ENTMCNC: 31.9 G/DL (ref 32–36)
MCV RBC AUTO: 98.6 FL (ref 80–99)
PLATELET # BLD AUTO: 205 10*3/MM3 (ref 150–450)
PMV BLD AUTO: 9.4 FL (ref 6–12)
POTASSIUM BLD-SCNC: 4.5 MMOL/L (ref 3.5–5.5)
RBC # BLD AUTO: 2.86 10*6/MM3 (ref 4.2–5.76)
SODIUM BLD-SCNC: 138 MMOL/L (ref 132–146)
WBC NRBC COR # BLD: 2.37 10*3/MM3 (ref 3.5–10.8)

## 2019-01-19 PROCEDURE — 99232 SBSQ HOSP IP/OBS MODERATE 35: CPT | Performed by: INTERNAL MEDICINE

## 2019-01-19 PROCEDURE — 85027 COMPLETE CBC AUTOMATED: CPT | Performed by: INTERNAL MEDICINE

## 2019-01-19 PROCEDURE — 80048 BASIC METABOLIC PNL TOTAL CA: CPT | Performed by: PHYSICIAN ASSISTANT

## 2019-01-19 RX ADMIN — METOPROLOL TARTRATE 25 MG: 25 TABLET ORAL at 09:19

## 2019-01-19 RX ADMIN — FUROSEMIDE 40 MG: 40 TABLET ORAL at 09:19

## 2019-01-19 RX ADMIN — FUROSEMIDE 40 MG: 40 TABLET ORAL at 17:56

## 2019-01-19 RX ADMIN — EPLERENONE 25 MG: 25 TABLET ORAL at 09:19

## 2019-01-19 RX ADMIN — APIXABAN 5 MG: 5 TABLET, FILM COATED ORAL at 20:04

## 2019-01-19 RX ADMIN — APIXABAN 5 MG: 5 TABLET, FILM COATED ORAL at 09:18

## 2019-01-19 RX ADMIN — ACETAMINOPHEN 650 MG: 325 TABLET ORAL at 04:18

## 2019-01-19 NOTE — PROGRESS NOTES
Smith Craven  [id number]  1950   LOS: 8 days   Patient Care Team:  Jeane Baird MD as PCP - General (Internal Medicine)  Brant Napier MD as Consulting Physician (Cardiology)  Kenna Ramirez MD as Consulting Physician (Hematology and Oncology)    Chief Complaint:  SOB / WEAKNESS    Subjective     Up on side of bed eating breakfast without difficulty or complaint.  Occasional scant clear sputum production noted.  No complaints of anginal type chest discomfort, increased tachypnea/dyspnea, nausea, emesis, abdominal pain, or GI/ difficulty.  No headache or focal motor-sensory changes. Continues to note significant edema in the left lower leg.    Objective     Vital Sign Min/Max for last 24 hours  Temp  Min: 97.7 °F (36.5 °C)  Max: 98 °F (36.7 °C)   BP  Min: 93/66  Max: 116/53   Pulse  Min: 54  Max: 67   Resp  Min: 16  Max: 18   SpO2  Min: 79 %  Max: 97 %               01/18/19  0600 01/18/19  1229   Weight: 112 kg (246 lb) (ht=70in, ra=773ng; BMI=35.3)         Intake/Output Summary (Last 24 hours) at 1/19/2019 0922  Last data filed at 1/18/2019 1800  Gross per 24 hour   Intake 240 ml   Output 1300 ml   Net -1060 ml       Physical Exam:     General Appearance:    Alert, cooperative, in no acute distress   Lungs:     Scattered rhonchi and wheezes with left posterior basilar crackles,respirations regular, even and unlabored    Heart:    Regular and normal rate, normal S1 and S2, grade 1/6           murmur, no gallop, no rub, no click   Abdomen:  Extremities:   Soft, non-tender, bowel sounds audible x4    1+ edema, normal range of motion   Pulses:   Pulses palpable and equal bilaterally      Results Review:   Results from last 7 days   Lab Units 01/19/19  0739 01/18/19  0554 01/17/19  0548   SODIUM mmol/L 138 138 138   POTASSIUM mmol/L 4.5 4.4 4.1   CHLORIDE mmol/L 110* 110* 109   CO2 mmol/L 24.0 23.0 23.0   BUN mg/dL 21 24* 25*   CREATININE mg/dL 1.44* 1.49* 1.56*   GLUCOSE mg/dL 89 94 99   CALCIUM  mg/dL 8.5* 8.3* 8.3*     Results from last 7 days   Lab Units 01/17/19  0548 01/16/19  0530   WBC 10*3/mm3 2.40* 2.54*   HEMOGLOBIN g/dL 8.3* 7.8*   HEMATOCRIT % 26.1* 24.2*   PLATELETS 10*3/mm3 192 175     NO CXR / EKG.    Medication Review: REVIEWED; NO DRIPS.    Assessment/Plan     Stable hemodynamics with slowly improving GFR despite acceptable diuresis with no repeat hemogram today.  We'll continue current cardiac medications and close observation and monitoring.    Discussed with patient and wife in room.      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

## 2019-01-19 NOTE — PLAN OF CARE
Problem: Patient Care Overview  Goal: Plan of Care Review  Outcome: Ongoing (interventions implemented as appropriate)   01/19/19 0416   Coping/Psychosocial   Plan of Care Reviewed With patient;spouse   Plan of Care Review   Progress no change   OTHER   Outcome Summary Pt continues to utilize home dose of 2L O2. BP on the low side, held HS coreg dose (25mg). Current order Coreg 25mg BID (home dose 12.5mg BID). Pt woke with c/o headache, treated successfully with Tylenol. Pt currently maintaining in rate controlled A-fib, rate occasionally bradycardic. Continue to monitor.        Problem: Chronic Obstructive Pulmonary Disease (Adult)  Goal: Signs and Symptoms of Listed Potential Problems Will be Absent, Minimized or Managed (Chronic Obstructive Pulmonary Disease)  Outcome: Ongoing (interventions implemented as appropriate)      Problem: Fall Risk (Adult)  Goal: Absence of Fall  Outcome: Ongoing (interventions implemented as appropriate)

## 2019-01-20 LAB
ANION GAP SERPL CALCULATED.3IONS-SCNC: 5 MMOL/L (ref 3–11)
BUN BLD-MCNC: 19 MG/DL (ref 9–23)
BUN/CREAT SERPL: 14.4 (ref 7–25)
CALCIUM SPEC-SCNC: 8.8 MG/DL (ref 8.7–10.4)
CHLORIDE SERPL-SCNC: 109 MMOL/L (ref 99–109)
CO2 SERPL-SCNC: 25 MMOL/L (ref 20–31)
CREAT BLD-MCNC: 1.32 MG/DL (ref 0.6–1.3)
DEPRECATED RDW RBC AUTO: 69.1 FL (ref 37–54)
ERYTHROCYTE [DISTWIDTH] IN BLOOD BY AUTOMATED COUNT: 19.6 % (ref 11.3–14.5)
GFR SERPL CREATININE-BSD FRML MDRD: 54 ML/MIN/1.73
GLUCOSE BLD-MCNC: 90 MG/DL (ref 70–100)
HCT VFR BLD AUTO: 28.9 % (ref 38.9–50.9)
HGB BLD-MCNC: 9.1 G/DL (ref 13.1–17.5)
MCH RBC QN AUTO: 30.5 PG (ref 27–31)
MCHC RBC AUTO-ENTMCNC: 31.5 G/DL (ref 32–36)
MCV RBC AUTO: 97 FL (ref 80–99)
PLATELET # BLD AUTO: 198 10*3/MM3 (ref 150–450)
PMV BLD AUTO: 9.2 FL (ref 6–12)
POTASSIUM BLD-SCNC: 4.1 MMOL/L (ref 3.5–5.5)
RBC # BLD AUTO: 2.98 10*6/MM3 (ref 4.2–5.76)
SODIUM BLD-SCNC: 139 MMOL/L (ref 132–146)
WBC NRBC COR # BLD: 2.45 10*3/MM3 (ref 3.5–10.8)

## 2019-01-20 PROCEDURE — 80048 BASIC METABOLIC PNL TOTAL CA: CPT | Performed by: PHYSICIAN ASSISTANT

## 2019-01-20 PROCEDURE — 85027 COMPLETE CBC AUTOMATED: CPT | Performed by: INTERNAL MEDICINE

## 2019-01-20 PROCEDURE — 99232 SBSQ HOSP IP/OBS MODERATE 35: CPT | Performed by: INTERNAL MEDICINE

## 2019-01-20 RX ADMIN — METOPROLOL TARTRATE 25 MG: 25 TABLET ORAL at 09:20

## 2019-01-20 RX ADMIN — FUROSEMIDE 40 MG: 40 TABLET ORAL at 09:07

## 2019-01-20 RX ADMIN — APIXABAN 5 MG: 5 TABLET, FILM COATED ORAL at 21:01

## 2019-01-20 RX ADMIN — FUROSEMIDE 40 MG: 40 TABLET ORAL at 18:38

## 2019-01-20 RX ADMIN — APIXABAN 5 MG: 5 TABLET, FILM COATED ORAL at 09:07

## 2019-01-20 RX ADMIN — EPLERENONE 25 MG: 25 TABLET ORAL at 09:07

## 2019-01-20 NOTE — PROGRESS NOTES
Smith Craven  [id number]  1950   LOS: 9 days   Patient Care Team:  Jeane Baird MD as PCP - General (Internal Medicine)  Brant Napier MD as Consulting Physician (Cardiology)  Kenna Ramirez MD as Consulting Physician (Hematology and Oncology)    Chief Complaint:  SOB / WEAKNESS    Subjective     Generally comfortable at rest this morning.  He notes mild to moderate tachypnea and dyspnea with activity in room and hallway.  He denies currently cough, sputum production, pleurisy, hemoptysis, GI/ difficulty, or abdominal pain.  No headache or focal motor-sensory changes.  He relates acceptable appetite.    Objective     Vital Sign Min/Max for last 24 hours  Temp  Min: 97.6 °F (36.4 °C)  Max: 98 °F (36.7 °C)   BP  Min: 101/73  Max: 120/59   Pulse  Min: 51  Max: 67   Resp  Min: 16  Max: 18   SpO2  Min: 67 %  Max: 97 %      Weight  Min: 111 kg (245 lb 9.6 oz)  Max: 111 kg (245 lb 9.6 oz)         01/18/19  1229 01/20/19  0608   Weight: (ht=70in, xe=282yn; BMI=35.3) 111 kg (245 lb 9.6 oz)         Intake/Output Summary (Last 24 hours) at 1/20/2019 0755  Last data filed at 1/20/2019 0349  Gross per 24 hour   Intake 890 ml   Output 4100 ml   Net -3210 ml       Physical Exam:     General Appearance:    Alert, cooperative, in no acute distress   Lungs:     Diminished breath sounds with scattered bibasilar crackles in the absence of additional adventitial sounds,respirations regular, even and unlabored    Heart:    Regular and normal rate, normal S1 and S2, grade 1/6            murmur, no gallop, no rub, no click   Abdomen:  Extremities:   Soft, non-tender, bowel sounds audible x4    No edema, normal range of motion   Pulses:   Pulses palpable and equal bilaterally      Results Review:   Results from last 7 days   Lab Units 01/20/19  0618 01/19/19  0739 01/18/19  0554   SODIUM mmol/L 139 138 138   POTASSIUM mmol/L 4.1 4.5 4.4   CHLORIDE mmol/L 109 110* 110*   CO2 mmol/L 25.0 24.0 23.0   BUN mg/dL 19 21 24*    CREATININE mg/dL 1.32* 1.44* 1.49*   GLUCOSE mg/dL 90 89 94   CALCIUM mg/dL 8.8 8.5* 8.3*     Results from last 7 days   Lab Units 01/20/19  0618 01/19/19  0739 01/17/19  0548   WBC 10*3/mm3 2.45* 2.37* 2.40*   HEMOGLOBIN g/dL 9.1* 9.0* 8.3*   HEMATOCRIT % 28.9* 28.2* 26.1*   PLATELETS 10*3/mm3 198 205 192     NO CXR / EKG.    Medication Review: REVIEWED; NO DRIPS.    Assessment/Plan     Improving clinical course with persistent stable moderate anemia and accompanying leukopenia with improved GFR following excellent diuresis.  We'll continue current cardiac medications.    Discussed with patient and wife in room.      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

## 2019-01-20 NOTE — PLAN OF CARE
Problem: Patient Care Overview  Goal: Plan of Care Review  Outcome: Ongoing (interventions implemented as appropriate)   01/20/19 0421   Coping/Psychosocial   Plan of Care Reviewed With patient;spouse   Plan of Care Review   Progress no change   OTHER   Outcome Summary Using home dose O2 2L n/c. Held BP med due to HR 50-60. Current order 25 BID home dose 12.5 BID. PT rested well this shift. No compaints. Will continue to monitor.       Problem: Chronic Obstructive Pulmonary Disease (Adult)  Goal: Signs and Symptoms of Listed Potential Problems Will be Absent, Minimized or Managed (Chronic Obstructive Pulmonary Disease)  Outcome: Ongoing (interventions implemented as appropriate)   01/20/19 0425   Goal/Outcome Evaluation   Problems Assessed (Chronic Obstructive Pulmonary Disease (COPD)) all   Problems Present (COPD, Bronch/Emphy) respiratory compromise;situational response       Problem: Fall Risk (Adult)  Goal: Identify Related Risk Factors and Signs and Symptoms  Outcome: Ongoing (interventions implemented as appropriate)   01/18/19 0401   Fall Risk (Adult)   Related Risk Factors (Fall Risk) age-related changes;fatigue/slow reaction;environment unfamiliar   Signs and Symptoms (Fall Risk) presence of risk factors     Goal: Absence of Fall  Outcome: Ongoing (interventions implemented as appropriate)   01/20/19 0425   Fall Risk (Adult)   Absence of Fall making progress toward outcome

## 2019-01-21 LAB
ANION GAP SERPL CALCULATED.3IONS-SCNC: 3 MMOL/L (ref 3–11)
BNP SERPL-MCNC: 330 PG/ML (ref 0–100)
BUN BLD-MCNC: 17 MG/DL (ref 9–23)
BUN/CREAT SERPL: 13.7 (ref 7–25)
CALCIUM SPEC-SCNC: 8.4 MG/DL (ref 8.7–10.4)
CHLORIDE SERPL-SCNC: 110 MMOL/L (ref 99–109)
CO2 SERPL-SCNC: 27 MMOL/L (ref 20–31)
CREAT BLD-MCNC: 1.24 MG/DL (ref 0.6–1.3)
DEPRECATED RDW RBC AUTO: 66.4 FL (ref 37–54)
ERYTHROCYTE [DISTWIDTH] IN BLOOD BY AUTOMATED COUNT: 19.1 % (ref 11.3–14.5)
GFR SERPL CREATININE-BSD FRML MDRD: 58 ML/MIN/1.73
GLUCOSE BLD-MCNC: 92 MG/DL (ref 70–100)
HCT VFR BLD AUTO: 26.4 % (ref 38.9–50.9)
HGB BLD-MCNC: 8.5 G/DL (ref 13.1–17.5)
MAGNESIUM SERPL-MCNC: 2.1 MG/DL (ref 1.3–2.7)
MCH RBC QN AUTO: 30.8 PG (ref 27–31)
MCHC RBC AUTO-ENTMCNC: 32.2 G/DL (ref 32–36)
MCV RBC AUTO: 95.7 FL (ref 80–99)
PLATELET # BLD AUTO: 153 10*3/MM3 (ref 150–450)
PMV BLD AUTO: 8.8 FL (ref 6–12)
POTASSIUM BLD-SCNC: 4.4 MMOL/L (ref 3.5–5.5)
RBC # BLD AUTO: 2.76 10*6/MM3 (ref 4.2–5.76)
SODIUM BLD-SCNC: 140 MMOL/L (ref 132–146)
WBC NRBC COR # BLD: 1.97 10*3/MM3 (ref 3.5–10.8)

## 2019-01-21 PROCEDURE — 80048 BASIC METABOLIC PNL TOTAL CA: CPT | Performed by: PHYSICIAN ASSISTANT

## 2019-01-21 PROCEDURE — 85027 COMPLETE CBC AUTOMATED: CPT | Performed by: INTERNAL MEDICINE

## 2019-01-21 PROCEDURE — 83880 ASSAY OF NATRIURETIC PEPTIDE: CPT | Performed by: INTERNAL MEDICINE

## 2019-01-21 PROCEDURE — 83735 ASSAY OF MAGNESIUM: CPT | Performed by: INTERNAL MEDICINE

## 2019-01-21 RX ADMIN — FUROSEMIDE 40 MG: 40 TABLET ORAL at 17:42

## 2019-01-21 RX ADMIN — APIXABAN 5 MG: 5 TABLET, FILM COATED ORAL at 08:18

## 2019-01-21 RX ADMIN — FUROSEMIDE 40 MG: 40 TABLET ORAL at 08:18

## 2019-01-21 RX ADMIN — APIXABAN 5 MG: 5 TABLET, FILM COATED ORAL at 21:27

## 2019-01-21 RX ADMIN — EPLERENONE 25 MG: 25 TABLET ORAL at 08:18

## 2019-01-21 RX ADMIN — METOPROLOL TARTRATE 25 MG: 25 TABLET ORAL at 08:18

## 2019-01-21 NOTE — PLAN OF CARE
Problem: Patient Care Overview  Goal: Plan of Care Review  Outcome: Ongoing (interventions implemented as appropriate)   01/21/19 0358   Coping/Psychosocial   Plan of Care Reviewed With patient   Plan of Care Review   Progress no change   OTHER   Outcome Summary Patient remains on 2L NC. No acute overnight events. VSS, but remains bradycardic. HS dose of metoprolol held r/t bradycardia and borderline SBP (102). Continue to monitor.       Problem: Chronic Obstructive Pulmonary Disease (Adult)  Goal: Signs and Symptoms of Listed Potential Problems Will be Absent, Minimized or Managed (Chronic Obstructive Pulmonary Disease)  Outcome: Ongoing (interventions implemented as appropriate)      Problem: Fall Risk (Adult)  Goal: Identify Related Risk Factors and Signs and Symptoms  Outcome: Ongoing (interventions implemented as appropriate)    Goal: Absence of Fall  Outcome: Ongoing (interventions implemented as appropriate)

## 2019-01-21 NOTE — PROGRESS NOTES
"  [facility]  PROGRESS NOTE    Date of Admission: 1/11/2019  Length of Stay: 10  Primary Care Physician: Jeane Baird MD    Chief Complaint: f/u class IV dyspnea   Problem List:   1. Cardiac disease, multifactorial:              A. Non-obstructive CAD by cath, January 2008.              B. Atrial fibrillation. CV = 2, on Eliquis              C. Normal LV function.  D. \"moderate to severe\" mitral regurgitation by transesophageal echocardiogram 01/2017  E. \"moderate to severe\" mitral regurgitation by TTE 07/2017, unchanged from previous study   F. TTE, 12/15/18: biatrial enlargement, normal LVEF, mild MR, trivial pericardial effusion, mild PA hypertension  G. RHC 1/11/19: Severe biventricular elevation in diastolic pressures and severe PAH  H. LAUREN 1/11/19: EF 55%, mild-mod MR, LA and RA are dilated, saline test results positive with valsalva for PFO  2. Obesity.  3.  Neurocardiogenic syncope.  4.  Cholelithiasis status post cholecystectomy, summer 2016:                          A. Procedure complicated by pneumonitis and need for blood transfusions.   5.  COPD and ANN MARIE, CPAP compliant:                          A. Followed by Yossi Blake MD.  6.  Chronic lower extremity venous insufficiency.  7. Chronic leukopenia and anemia  8.  Gilbert's syndrome   9.  \"Fatty liver with no cirrhosis or amyloid\" diagnosed by liver biopsy, Summer 2018    10.  Hospitalization, 12/2017: shortness of breath                          A.  Bilateral pleural effusions/pericardial effusion noted.                          B.  TJE=174, negative troponins                          C.  \"MASSIVE\" spleenomegaly with no important ascites                          D.   Recent O2 for desaturation.  11. Progressive dyspnea Winter 2018              A. Severe biventricular elevation in diastolic pressures by RHC January 2019              B. Severe PAH               C. LAUREN 1/11/19: EF 55%, mild-mod " "MR    Subjective      Patient feels about the same, stable at rest.     Objective   Vitals: /72 (BP Location: Left arm, Patient Position: Lying)   Pulse 65   Temp 98.1 °F (36.7 °C) (Oral)   Resp 16   Ht 177.8 cm (70\")   Wt 110 kg (243 lb 3.2 oz)   SpO2 90%   BMI 34.90 kg/m²     Physical Exam:  GENERAL: Alert, cooperative, in no acute distress.   HEENT: Normocephalic, no jugular venous distention  HEART: No discrete PMI is noted. Regular rhythm, normal rate, and no murmurs, gallops, or rubs.   LUNGS: Clear to auscultation bilaterally. No wheezing, rales or rhonchi.  ABDOMEN: Soft, bowel sounds present, non-tender   NEUROLOGIC: No focal abnormalities involving strength or sensation are noted.   EXTREMITIES: No clubbing, cyanosis.     Results:  Results from last 7 days   Lab Units 01/21/19  0641 01/20/19  0618 01/19/19  0739   WBC 10*3/mm3 1.97* 2.45* 2.37*   HEMOGLOBIN g/dL 8.5* 9.1* 9.0*   HEMATOCRIT % 26.4* 28.9* 28.2*   PLATELETS 10*3/mm3 153 198 205     Results from last 7 days   Lab Units 01/21/19  0641 01/20/19  0618 01/19/19  0739   SODIUM mmol/L 140 139 138   POTASSIUM mmol/L 4.4 4.1 4.5   CHLORIDE mmol/L 110* 109 110*   CO2 mmol/L 27.0 25.0 24.0   BUN mg/dL 17 19 21   CREATININE mg/dL 1.24 1.32* 1.44*   GLUCOSE mg/dL 92 90 89      Lab Results   Component Value Date    CHOL 105 01/11/2019    TRIG 68 01/11/2019    HDL 29 (L) 01/11/2019    LDL 70 01/11/2019    AST 19 01/17/2019    ALT 14 01/17/2019       Results from last 7 days   Lab Units 01/21/19  0641 01/16/19  2309   BNP pg/mL 330.0* 348.0*       Intake/Output Summary (Last 24 hours) at 1/21/2019 1400  Last data filed at 1/21/2019 0905  Gross per 24 hour   Intake 875 ml   Output 3475 ml   Net -2600 ml     I personally reviewed the patient's EKG/Telemetry data    Current Medications:    apixaban 5 mg Oral Q12H   eplerenone 25 mg Oral Q24H   fentaNYL citrate (PF)      Fluticasone Furoate-Vilanterol 1 puff Inhalation Daily   furosemide 40 mg Oral " BID   metoprolol tartrate 25 mg Oral Q12H   midazolam      midazolam      O2 2 L/min Inhalation Q24H          Assessment and Plan:     1. Progressive class IV dyspnea  - elevated biventricular pressures with severe PAH by RHC   - LAUREN with normal LVSF and mild-mod MR  - will need to exclude restrictive cardiomyopathy (See Dr. Napier' note from 1/11)   - amyloid stain negative on liver biopsy from [facility] per Dr. Wright  - now on PO Lasix 40mg BID and Spironolactone; still diuresing     2. CARA   - Cr improved to 1.24 this AM     3. Afib  - continue Eliquis, rate is controlled     4. Left leg cellulitis  - mostly resolved         5. Anemia      - likely multifactorial      - s/p 1 unit PRBC's 1/16                  - per Dr. Hankins     Disposition: Await final recommendations from Dr. Hankins and tentatively home tomorrow with referral to [facility] for further evaluation and treatment.     Electronically signed by Hattie Montoya PA-C, 01/21/19, 2:09 PM.

## 2019-01-21 NOTE — PROGRESS NOTES
Continued Stay Note   [place]     Patient Name: Smith Craven  MRN: [id number]  Today's Date: 1/21/2019    Admit Date: 1/11/2019    Discharge Plan     Row Name 01/21/19 1535       Plan    Plan  update    Patient/Family in Agreement with Plan  yes    Plan Comments  Spoke with patient and wife at bedside regarding discharge plan.  Patient reports that he should be going home tomorrow and that he has been referred to the [facility].  No need noted at this time.  CM following.  Patient plan is to discharge home via car with family to transport when medically ready.     Final Discharge Disposition Code  01 - home or self-care        Discharge Codes    No documentation.       Expected Discharge Date and Time     Expected Discharge Date Expected Discharge Time    Jan 24, 2019             Tayla Samano RN

## 2019-01-21 NOTE — PROGRESS NOTES
I was asked to summarize his medical workup from a hematologic perspective due to his persistent anemia.    Mr. Craven is a very pleasant 68-year-old gentleman who was followed for a number of years by his primary hematologist Dr. Ramirez in [place], [place].  He was noted to have persistent anemia and splenomegaly over that period of time.  I first saw him in December 2018 when he was admitted to [facility] to evaluate him for dyspnea with  persistent transfusion-dependent anemia and splenomegaly.  The spleen measured about 20 cm in its size.  His haptoglobin was unmeasurable however his FERNANDO was negative.  He underwent a bone marrow biopsy and the results were fairly unremarkable except for a very tiny population of CD5 positive cells present.  Dr. Napier his cardiologist asked me to rule out amyloidosis as a cause of his underlying heart issues.  The Congo red stains performed on his bone marrow biopsy and the liver biopsy which was done at [facility] were both negative.  I felt that the most likely cause of his splenomegaly was secondary to increased right-sided pressures causing vascular congestion and resulting splenomegaly.  I suspect he is hemolyzing within the spleen and possibly in the lungs which would explain the low haptoglobin.  I've suggested transfusion support as necessary to maintain his hemoglobin above 8.  I also vaccinated him for encapsulated bacteria in January 2019 just in case we had to remove his spleen.    Cytogenetics Report, Addendum   ADDENDUM COMMENT: At the request of Dr. Wright, congo-red stain is performed on the bone marrow biopsy. With appropriate control, the stain is negative for amyloid, viewed under polarized light.  [id number]    Addendum electronically signed by Michael Mcclelland MD on 1/17/2019 at 0848   Clinical Information    The working history is splenomegaly, chronic anemia, bilateral pleural effusions and pericardial effusion..  Procedure performed by Dr. Mcclelland..      Bone Marrow Procedure Note  Date: 12/17/18             Time: 1:05 p.m.  No known allergy to Betadine or Lidocaine     The patient was identified by wrist band, verbally and by the nurse. The bone marrow procedure was explained and the potential complications of bleeding, bruising and infection were discussed. The patient agrees to proceed. The patient is rolled onto the right side with knees tucked toward their chin and the left posterior iliac crest is palpated and the bone prominence located for the puncture site. The area was cleaned 3x with betadine then draped with sterile towels, anesthetized with 12 mL of lidocaine by infiltration to the periosteal surface. A small incision made through which the marrow needle is introduced. Marrow aspiration of 9 mL was collected and a marrow core was obtained. The needle was withdrawn and pressure applied to the site. The area was cleaned with alcohol after drapes were removed and a sterile bandage placed over the site. The patient was rolled onto their back with a towel roll placed to provide pressure to the site for hemostasis. The patient tolerated the procedure well and no complications were encountered.       The marrow was submitted for:  Routine exam                                                     Flow cytometry                                                     Cytogenetics        Dr. Michael Mcclelland, Department of Pathology   Final Diagnosis    PERIPHERAL BLOOD SMEAR:  Mild normochromic, normocytic anemia, normal leukocyte population, and adequate platelets.   BONE MARROW BIOPSY AND ASPIRATE, LEFT POSTERIOR ILIAC CREST:  Hypercellular marrow with erythroid hyperplasia.  No increase in lymphoid population detected on routine examination or by immunohistochemical stain.  Trilineage hematopoiesis without johann dysplastic features.  Flow cytometry showing a tiny abnormal CD5 positive B-cell population.  See comment.       Electronically signed by Michael Mcclelland MD on 12/21/2018 at 1718   Comment    The bone marrow shows erythroid hyperplasia, probably secondary to the patient's splenomegaly with breakdown of red cells.  Flow cytometry revealed a tiny clonal population of B-cells (0.1 %).  Given the extremely small number of cells showing these findings, this is not diagnostic of B-cell neoplasm and may simply represent small clone cells of unknown significance. This may or may not progress.  There is no evidence of a leukemia.     Gross Description    Received are bone aspirate smears.      Received in formalin labeled as bone marrow is a portion of blood clot and possible embedded marrow particles.  The specimen is filtered and submitted entirely in cassette 1.     Received in formalin labeled as bone marrow biopsy is an apparent core biopsy of bone and associated blood which is submitted in toto in cassette 2, following decalcification.    Special Stains    Examination of the iron stain shows only occasional iron particles without ring sideroblasts.  In order to better define the types of cell populations present, four immunohistochemical stains are performed on blocks 1-A and 1-B.  The antibody CD3 is positive in background population of cells without nodular areas.  These represent approximately 10% of the cells. Antibody CD20 shows a similar scattered population of cells without nodular areas.  This is true on both the clot section and the biopsy.  Antibody CD34 shows scattered positivity in less than 5% of the cell population.  Antibody  shows a similar positive staining pattern with cells comprising less than 5% of the cell population.  There are no nodular areas taking up the stain.  These are viewed with appropriate internal and external positive and negative controls as required.   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     Testing performed at outside laboratory. See scanned report.

## 2019-01-21 NOTE — PLAN OF CARE
Problem: Patient Care Overview  Goal: Plan of Care Review  Outcome: Ongoing (interventions implemented as appropriate)   01/21/19 1800   Coping/Psychosocial   Plan of Care Reviewed With patient;spouse   Plan of Care Review   Progress no change   OTHER   Outcome Summary patient remains on 2 L nasal cannula. VSS, heart rate bradycardic majority of day. Walked in hallway. VSS will continue to monitor, continue POC. Plan is to go to [facility] and be d/c tomorrow.       Problem: Chronic Obstructive Pulmonary Disease (Adult)  Goal: Signs and Symptoms of Listed Potential Problems Will be Absent, Minimized or Managed (Chronic Obstructive Pulmonary Disease)  Outcome: Ongoing (interventions implemented as appropriate)      Problem: Fall Risk (Adult)  Goal: Identify Related Risk Factors and Signs and Symptoms  Outcome: Ongoing (interventions implemented as appropriate)    Goal: Absence of Fall  Outcome: Ongoing (interventions implemented as appropriate)

## 2019-01-22 VITALS
SYSTOLIC BLOOD PRESSURE: 124 MMHG | BODY MASS INDEX: 35.05 KG/M2 | RESPIRATION RATE: 22 BRPM | HEIGHT: 70 IN | TEMPERATURE: 97.6 F | DIASTOLIC BLOOD PRESSURE: 64 MMHG | HEART RATE: 71 BPM | WEIGHT: 244.8 LBS | OXYGEN SATURATION: 94 %

## 2019-01-22 LAB
ANION GAP SERPL CALCULATED.3IONS-SCNC: 6 MMOL/L (ref 3–11)
BUN BLD-MCNC: 18 MG/DL (ref 9–23)
BUN/CREAT SERPL: 15.8 (ref 7–25)
CALCIUM SPEC-SCNC: 8.1 MG/DL (ref 8.7–10.4)
CHLORIDE SERPL-SCNC: 108 MMOL/L (ref 99–109)
CO2 SERPL-SCNC: 26 MMOL/L (ref 20–31)
CREAT BLD-MCNC: 1.14 MG/DL (ref 0.6–1.3)
DEPRECATED RDW RBC AUTO: 65.8 FL (ref 37–54)
ERYTHROCYTE [DISTWIDTH] IN BLOOD BY AUTOMATED COUNT: 19 % (ref 11.3–14.5)
GFR SERPL CREATININE-BSD FRML MDRD: 64 ML/MIN/1.73
GLUCOSE BLD-MCNC: 91 MG/DL (ref 70–100)
HCT VFR BLD AUTO: 25.6 % (ref 38.9–50.9)
HGB BLD-MCNC: 8.2 G/DL (ref 13.1–17.5)
MCH RBC QN AUTO: 30.5 PG (ref 27–31)
MCHC RBC AUTO-ENTMCNC: 32 G/DL (ref 32–36)
MCV RBC AUTO: 95.2 FL (ref 80–99)
PLATELET # BLD AUTO: 141 10*3/MM3 (ref 150–450)
PMV BLD AUTO: 8.6 FL (ref 6–12)
POTASSIUM BLD-SCNC: 4 MMOL/L (ref 3.5–5.5)
RBC # BLD AUTO: 2.69 10*6/MM3 (ref 4.2–5.76)
SODIUM BLD-SCNC: 140 MMOL/L (ref 132–146)
TSH SERPL DL<=0.05 MIU/L-ACNC: 2.82 MIU/ML (ref 0.35–5.35)
WBC NRBC COR # BLD: 1.87 10*3/MM3 (ref 3.5–10.8)

## 2019-01-22 PROCEDURE — 99239 HOSP IP/OBS DSCHRG MGMT >30: CPT | Performed by: INTERNAL MEDICINE

## 2019-01-22 PROCEDURE — 85027 COMPLETE CBC AUTOMATED: CPT | Performed by: INTERNAL MEDICINE

## 2019-01-22 PROCEDURE — 84443 ASSAY THYROID STIM HORMONE: CPT | Performed by: PHYSICIAN ASSISTANT

## 2019-01-22 PROCEDURE — 80048 BASIC METABOLIC PNL TOTAL CA: CPT | Performed by: PHYSICIAN ASSISTANT

## 2019-01-22 PROCEDURE — 94799 UNLISTED PULMONARY SVC/PX: CPT

## 2019-01-22 RX ORDER — EPLERENONE 25 MG/1
25 TABLET, FILM COATED ORAL
Qty: 30 TABLET | Refills: 11 | Status: SHIPPED | OUTPATIENT
Start: 2019-01-23 | End: 2019-07-30 | Stop reason: SDUPTHER

## 2019-01-22 RX ORDER — METOPROLOL SUCCINATE 25 MG/1
25 TABLET, EXTENDED RELEASE ORAL EVERY MORNING
Qty: 30 TABLET | Refills: 11 | Status: SHIPPED | OUTPATIENT
Start: 2019-01-22 | End: 2019-07-30 | Stop reason: SDUPTHER

## 2019-01-22 RX ORDER — FUROSEMIDE 40 MG/1
40 TABLET ORAL 2 TIMES DAILY
Qty: 60 TABLET | Refills: 11 | Status: SHIPPED | OUTPATIENT
Start: 2019-01-22 | End: 2020-01-22

## 2019-01-22 RX ADMIN — FUROSEMIDE 40 MG: 40 TABLET ORAL at 08:52

## 2019-01-22 RX ADMIN — METOPROLOL TARTRATE 25 MG: 25 TABLET ORAL at 08:52

## 2019-01-22 RX ADMIN — EPLERENONE 25 MG: 25 TABLET ORAL at 08:52

## 2019-01-22 RX ADMIN — APIXABAN 5 MG: 5 TABLET, FILM COATED ORAL at 08:51

## 2019-01-22 NOTE — PLAN OF CARE
Problem: Patient Care Overview  Goal: Plan of Care Review  Outcome: Ongoing (interventions implemented as appropriate)   01/22/19 0552   Coping/Psychosocial   Plan of Care Reviewed With patient   Plan of Care Review   Progress no change   OTHER   Outcome Summary Remains on 2L NC. VSS, but remains bradycardic. PM dose metoprolol held. Continue to monitor-- plan to D/C home today       Problem: Chronic Obstructive Pulmonary Disease (Adult)  Goal: Signs and Symptoms of Listed Potential Problems Will be Absent, Minimized or Managed (Chronic Obstructive Pulmonary Disease)  Outcome: Ongoing (interventions implemented as appropriate)      Problem: Fall Risk (Adult)  Goal: Identify Related Risk Factors and Signs and Symptoms  Outcome: Ongoing (interventions implemented as appropriate)    Goal: Absence of Fall  Outcome: Ongoing (interventions implemented as appropriate)

## 2019-01-22 NOTE — DISCHARGE SUMMARY
"    [facility]  DISCHARGE SUMMARY       Date of Admission: 1/11/2019  Date of Discharge:  1/22/2019  Primary Care Physician: Jeane Baird MD  Attending Physician: Brant Napier MD  Consulting Physicians: Dr. Carlos Petty    Discharge Diagnoses:  1. Progressive dyspnea Winter 2018  1. Admission December 2018 with \"MASSIVE\" splenomegaly with no important ascites as only important finding  2. WEG=158, negative troponins   3. Recent O2 for desaturation   4. Severe biventricular elevation in diastolic pressures by RHC January 2019  5. Severe PAH  6. LAUREN 1/11/2019: EF 55%, mild-moderate MR  7. \"Mild\" COPD and negative VQ scan January 2019   2. Cardiac disease, multifactorial:  1. Non-obstructive CAD by cath, January 2008.  2. Atrial fibrillation. CV = 2, on Eliquis  3. Normal LV function  4. \"moderate to severe\" mitral regurgitation by transesophageal echocardiogram 01/2017  5. \"moderate to severe\" mitral regurgitation by TTE 07/2017, unchanged from previous study   6. TTE, 12/15/18: biatrial enlargement, normal LVEF, mild MR, trivial pericardial effusion, mild PA hypertension  7. RHC 1/11/19: Severe biventricular elevation in diastolic pressures and severe PAH  8. LAUREN 1/11/19: EF 55%, mild-mod MR, LA and RA are dilated, saline test results positive with valsalva for PFO  3. Obesity  4. Neurocardiogenic syncope.  5. Cholelithiasis status post cholecystectomy, summer 2016:  1. Procedure complicated by pneumonitis and need for blood transfusions.   6. COPD and ANN MARIE  1. Followed by Yossi Blake MD  7. Chronic lower extremity venous insufficiency   8. Chronic leukopenia and anemia  9. Gilbert's syndrome   10. \"Fatty liver with no cirrhosis or amyloid\" diagnosed by liver biopsy, Summer 2018    11. Splenomegaly   1. \"Unremarkable\" bone marrow biopsy December 2018, NEGATIVE for amyloid   2. Likely related to vascular congestion and increased right sided " "pressures per Dr. Wright   12. CARA, resolved     Hospital Course:   Patient is a 68 y.o. male who presented with progressively severe shortness of air with mild arterial oxygen desaturation following a recent admission in December with similar complaints. He underwent right heart catheterization on January 11th and was found to have severe biventricular elevation in diastolic pressures as well as severe PAH. A subsequent LAUREN demonstrated normal LVEF, and only mild-moderate MR. He was admitted and had excellent diuresis with IV Lasix. Hematology was consulted due to history of splenomegaly (20cm) and due to ongoing anemia. Bone marrow biopsy had been performed in December and this was largely unremarkable. He had previously undergone a liver biopsy at [facility] which was remarkable for \"fatty liver\" without cirrhosis. [place] red stains were then performed on both liver and bone marrow biopsies and these were BOTH negative for amyloid. Pulmonary saw patient as well due to history of mild-mod COPD and ANN MARIE, however workup including a VQ scan was negative. Patient diuresed very well with Lasix and Inspra was added. He improved clinically from a respiratory status, and was ambulatory and felt ready for discharge home on 1/22/2019. He will be referred to [facility] for further evaluation and we will call him with this appointment.     Procedures Performed  Procedure(s):  Right Heart Cath 1/11/19:  Hemodynamic Findings:  · AO pressure: 145/73 mmHg (cuff)  · RA pressure, mean: 20 mmHg  · RV pressure: 80/20 mmHg  · PA pressure: 80/30, mean=54 mmHg  · PCWP pressure, mean: 30 with 50 mmHg \"V\"  · NO CO determination due to catheter failure.     Final Impression: Severe biventricular elevation in diastolic pressures and severe pulmonary artery hypertension.       Pertinent Test Results:   LAUREN 1/11/2019:  Interpretation Summary     · Left ventricular systolic function is normal. Estimated EF = " "55%.  · Left atrial cavity size is dilated.  · Interatrial septal aneurysm present.  · Saline test results are positive with valsalva manuever for evidence of a PFO.  · Right atrial cavity size is dilated.  · Mild-to-moderate mitral valve regurgitation is present     Lab Results   Component Value Date    WBC 1.87 (C) 01/22/2019    HGB 8.2 (L) 01/22/2019    HCT 25.6 (L) 01/22/2019    MCV 95.2 01/22/2019     (L) 01/22/2019     Lab Results   Component Value Date    GLUCOSE 91 01/22/2019    BUN 18 01/22/2019    CREATININE 1.14 01/22/2019    EGFRIFNONA 64 01/22/2019    BCR 15.8 01/22/2019    K 4.0 01/22/2019    CO2 26.0 01/22/2019    CALCIUM 8.1 (L) 01/22/2019    ALBUMIN 3.59 01/17/2019    AST 19 01/17/2019    ALT 14 01/17/2019     Lab Results   Component Value Date    HGBA1C 3.90 (L) 01/11/2019     Lab Results   Component Value Date    CHOL 105 01/11/2019    TRIG 68 01/11/2019    HDL 29 (L) 01/11/2019    LDL 70 01/11/2019     BNP 1/21/2019: 330    CXR 1/14/19:  IMPRESSION:  Small bilateral pleural effusions with mild increased  markings identified left lung base. No acute parenchymal disease.     Physical Exam on Discharge:/64   Pulse 71   Temp 97.7 °F (36.5 °C) (Oral)   Resp 16   Ht 177.8 cm (70\")   Wt 111 kg (244 lb 12.8 oz)   SpO2 92%   BMI 35.13 kg/m²     General Appearance No acute distress, obese, cooperative   Neck No adenopathy, supple, trachea midline, no JVD   Lungs Clear to auscultation,respirations regular, even and unlabored, few rales bilateral bases, no ronchi or wheezing.On 2L NC   Heart Irregular rhythm and normal rate, normal S1 and S2, no murmur, no gallop, no rub, no click   Chest wall No abnormalities observed   Abdomen Obese, soft, normal bowel sounds   Extremities Moves all extremities well, 1+ edema LLE, mild erythema   Neurological Alert and oriented x 3     Discharge Medications     Discharge Medications      New Medications      Instructions Start Date   eplerenone 25 MG " tablet  Commonly known as:  INSPRA   25 mg, Oral, Every 24 Hours Scheduled      metoprolol succinate XL 25 MG 24 hr tablet  Commonly known as:  TOPROL-XL   25 mg, Oral, Every Morning         Changes to Medications      Instructions Start Date   furosemide 40 MG tablet  Commonly known as:  LASIX  What changed:    · when to take this  · reasons to take this   40 mg, Oral, 2 Times Daily         Continue These Medications      Instructions Start Date   apixaban 5 MG tablet tablet  Commonly known as:  ELIQUIS   5 mg, Oral, 2 Times Daily      cholecalciferol 1000 units tablet  Commonly known as:  VITAMIN D3   5,000 Units, Oral, Every Morning      Fluticasone Furoate-Vilanterol 200-25 MCG/INH aerosol powder  inhaler  Commonly known as:  BREO ELLIPTA   1 puff, Inhalation, Daily      folic acid 1 MG tablet  Commonly known as:  FOLVITE   1 mg, Oral, Every Morning      O2  Commonly known as:  OXYGEN   2 L/min, Inhalation, Every 24 Hours      vitamin B-12 1000 MCG tablet  Commonly known as:  CYANOCOBALAMIN   2,500 mcg, Oral, Daily         Stop These Medications    metoprolol tartrate 25 MG tablet  Commonly known as:  LOPRESSOR     potassium chloride 20 MEQ CR tablet  Commonly known as:  K-DUR,KLOR-CON     sulfamethoxazole-trimethoprim 800-160 MG per tablet  Commonly known as:  BACTRIM DS,SEPTRA DS            Discharge Diet: Cardiac    Activity at Discharge: as tolerated    Condition on Discharge: stable    Follow-up Appointments:  · Patient will be referred to [facility] for further evaluation. We will call him with this appointment.        Scribed for Brant Napier MD by Hattie Montoya PA-C. 1/22/2019  10:06 AM    Time: Discharge >30 min

## 2019-01-23 ENCOUNTER — READMISSION MANAGEMENT (OUTPATIENT)
Dept: CALL CENTER | Facility: HOSPITAL | Age: 69
End: 2019-01-23

## 2019-01-23 NOTE — OUTREACH NOTE
Prep Survey      Responses   Facility patient discharged from?  [place]   Is patient eligible?  Yes   Discharge diagnosis  Progressive dyspnea, cardiac disease, multifactorial,obesity, neurocardiogenic syncope, cholelithiasis s/p cholecystectomy 2018, COPD and ANN MARIE,   Does the patient have one of the following disease processes/diagnoses(primary or secondary)?  CHF   Does the patient have Home health ordered?  No   Is there a DME ordered?  No   Comments regarding appointments  See AVS   Prep survey completed?  Yes          Radha Leos RN

## 2019-01-24 ENCOUNTER — READMISSION MANAGEMENT (OUTPATIENT)
Dept: CALL CENTER | Facility: HOSPITAL | Age: 69
End: 2019-01-24

## 2019-01-24 NOTE — OUTREACH NOTE
CHF Week 1 Survey      Responses   Facility patient discharged from?  [place]   Does the patient have one of the following disease processes/diagnoses(primary or secondary)?  CHF   Is there a successful TCM telephone encounter documented?  No   CHF Week 1 attempt successful?  No   Unsuccessful attempts  Attempt 1          Karen Cuellar RN

## 2019-01-25 ENCOUNTER — READMISSION MANAGEMENT (OUTPATIENT)
Dept: CALL CENTER | Facility: HOSPITAL | Age: 69
End: 2019-01-25

## 2019-01-25 NOTE — OUTREACH NOTE
CHF Week 1 Survey      Responses   Facility patient discharged from?  [place]   Does the patient have one of the following disease processes/diagnoses(primary or secondary)?  CHF   Is there a successful TCM telephone encounter documented?  No   CHF Week 1 attempt successful?  Yes   Call start time  1511   Call end time  1521   Discharge diagnosis  Progressive dyspnea, cardiac disease, multifactorial,obesity, neurocardiogenic syncope, cholelithiasis s/p cholecystectomy 2018, COPD and ANN MARIE,   Is patient permission given to speak with other caregiver?  Yes   Person spoke with today (if not patient) and relationship  [place] Spouse   Meds reviewed with patient/caregiver?  Yes   Is the patient having any side effects they believe may be caused by any medication additions or changes?  No   Does the patient have all medications ordered at discharge?  Yes   Is the patient taking all medications as directed (includes completed medication regime)?  Yes   Does the patient have a primary care provider?   Yes   Does the patient have an appointment with their PCP within 7 days of discharge?  Yes   Has the patient kept scheduled appointments due by today?  N/A   Has home health visited the patient within 72 hours of discharge?  N/A   Psychosocial issues?  No   Did the patient receive a copy of their discharge instructions?  Yes   Nursing interventions  Reviewed instructions with patient   What is the patient's perception of their health status since discharge?  Improving   Nursing interventions  Nurse provided patient education   Is the patient weighing daily?  Yes   Does the patient have scales?  Yes   Daily weight interventions  Education provided on importance of daily weight   Is the patient able to teach back Heart Failure diet management?  Yes   Is the patient able to teach back Heart Failure Zones?  Yes   Is the patient able to teach back signs and symptoms of worsening condition? (i.e. weight gain, shortness of air, etc.)   Yes   Is the patient/caregiver able to teach back the hierarchy of who to call/visit for symptoms/problems? PCP, Specialist, Home health nurse, Urgent Care, ED, 911  Yes   Additional teach back comments  COPD with CHF, he is doing much better right now she says.    CHF Week 1 call completed?  Yes          Karen Cuellar RN

## 2019-01-29 ENCOUNTER — TELEPHONE (OUTPATIENT)
Dept: PULMONOLOGY | Facility: CLINIC | Age: 69
End: 2019-01-29

## 2019-01-29 NOTE — TELEPHONE ENCOUNTER
Jadyn from [facility] called asking for us to fax an order with CPap settings on it. The hospital gave him an order but it had no settings. I faxed an order to them with settings of original Sleep study.

## 2019-02-01 ENCOUNTER — READMISSION MANAGEMENT (OUTPATIENT)
Dept: CALL CENTER | Facility: HOSPITAL | Age: 69
End: 2019-02-01

## 2019-02-01 NOTE — OUTREACH NOTE
CHF Week 2 Survey      Responses   Facility patient discharged from?  [place]   Does the patient have one of the following disease processes/diagnoses(primary or secondary)?  CHF   Week 2 attempt successful?  Yes   Call start time  1501   Call end time  1511   Discharge diagnosis  Progressive dyspnea, cardiac disease, multifactorial,obesity, neurocardiogenic syncope, cholelithiasis s/p cholecystectomy 2018, COPD and ANN MARIE,   Meds reviewed with patient/caregiver?  Yes   Is the patient taking all medications as directed (includes completed medication regime)?  Yes   Has the patient kept scheduled appointments due by today?  N/A   DME comments  Pt wearing home O2 continuously.    Psychosocial issues?  No   Comments  SOA remains but some better, difficulty lying down. Elevate legs. [facility]    What is the patient's perception of their health status since discharge?  Improving   Nursing interventions  Nurse provided patient education   Is the patient weighing daily?  Yes   Is the patient able to teach back Heart Failure Zones?  Yes   Is the patient/caregiver able to teach back the hierarchy of who to call/visit for symptoms/problems? PCP, Specialist, Home health nurse, Urgent Care, ED, 911  Yes   CHF Week 2 call completed?  Yes          Kelsea Oh RN

## 2019-02-11 ENCOUNTER — READMISSION MANAGEMENT (OUTPATIENT)
Dept: CALL CENTER | Facility: HOSPITAL | Age: 69
End: 2019-02-11

## 2019-02-11 NOTE — OUTREACH NOTE
CHF Week 3 Survey      Responses   Facility patient discharged from?  [place]   Does the patient have one of the following disease processes/diagnoses(primary or secondary)?  CHF   Week 3 attempt successful?  No   Unsuccessful attempts  Attempt 1          Kelsea Oh RN

## 2019-02-12 ENCOUNTER — READMISSION MANAGEMENT (OUTPATIENT)
Dept: CALL CENTER | Facility: HOSPITAL | Age: 69
End: 2019-02-12

## 2019-02-12 NOTE — OUTREACH NOTE
CHF Week 3 Survey      Responses   Facility patient discharged from?  [place]   Does the patient have one of the following disease processes/diagnoses(primary or secondary)?  CHF   Week 3 attempt successful?  No   Unsuccessful attempts  Attempt 2          Jeane Curran RN

## 2019-03-06 LAB
CYTO UR: NORMAL
DIFF PNL MAR: NORMAL
LAB AP ASPIRATE SMEAR: NORMAL
LAB AP CASE REPORT: NORMAL
LAB AP CBC AND DIFFERENTIAL: NORMAL
LAB AP CLINICAL INFORMATION: NORMAL
LAB AP CLOT SECTION: NORMAL
LAB AP CORE BIOPSY: NORMAL
LAB AP CYTOGENETICS REPORT,ADDENDUM: NORMAL
LAB AP DIAGNOSIS COMMENT: NORMAL
LAB AP FLOW CYTOMETRY SUMMARY: NORMAL
LAB AP OUTSIDE REPORT, ADDENDUM: NORMAL
LAB AP SPECIAL STAINS: NORMAL
PATH REPORT.FINAL DX SPEC: NORMAL
PATH REPORT.GROSS SPEC: NORMAL

## 2019-03-15 ENCOUNTER — TELEPHONE (OUTPATIENT)
Dept: CARDIOLOGY | Facility: CLINIC | Age: 69
End: 2019-03-15

## 2019-03-15 NOTE — PROGRESS NOTES
Encounter Date:03/18/2019      Patient ID: Smith Craven is a 68 y.o. male.        Subjective:     Chief Complaint: Establish Care and Edema     History of Present Illness patient presents to the hf center today at the request of Dr Napier for IV lasix. He notes that has been experiencing worsening abdominal fullness, pedal edema and dyspnea over the last few days. He notes compliance with his medications. Denies cp, dizziness, palpitations. He is headed to [facility] after this appt for further evaluation of his splenomegaly.   Patient Active Problem List   Diagnosis   • Non-obstructive coronary artery disease   • Atrial fibrillation (CMS/HCC)   • Neurocardiogenic syncope   • SOB (shortness of breath)   • Calculus of gallbladder with biliary obstruction but without cholecystitis   • Bronchitis   • Obesity (BMI 30-39.9)   • Iron deficiency anemia   • Reactive airway disease   • ANN MARIE on CPAP   • Peripheral venous insufficiency   • COPD (chronic obstructive pulmonary disease) (CMS/HCC)   • Mitral valve regurgitation   • Gilbert's syndrome   • Splenomegaly   • Pleural effusion, bilateral   • Fatty liver- No cirrhosis by BX   • Mitral valve regurgitation, MILD MR   • Chronic anemia   • Pericardial effusion   • Mitral valve insufficiency   • Pulmonary hypertension (CMS/HCC)   • 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 NAPIER   • CARDIAC CATHETERIZATION Bilateral 6/17/2016    Procedure: Right and possible Left Heart Cath;  Surgeon: Brant Napier MD;  Location:  [facility] CATH INVASIVE LOCATION;  Service:    • CARDIAC CATHETERIZATION N/A 1/11/2019    Procedure: Right Heart Cath, no eliquis hold;  Surgeon: Brant Napier MD;  Location:  [facility] CATH INVASIVE LOCATION;  Service: Cardiology   • CATARACT EXTRACTION Right 2016   • CHOLECYSTECTOMY  08/2016   • CHOLECYSTECTOMY WITH INTRAOPERATIVE CHOLANGIOGRAM N/A  7/29/2016    Procedure: CHOLECYSTECTOMY LAPAROSCOPIC INTRAOPERATIVE CHOLANGIOGRAM;  Surgeon: Max Morocho MD;  Location: [facility] OR;  Service:    • COLONOSCOPY     • ERCP N/A 7/28/2016    Procedure: ENDOSCOPIC RETROGRADE CHOLANGIOPANCREATOGRAPHY;  Surgeon: Oli George III, MD;  Location: [facility] OR;  Service:    • FRACTURE SURGERY     • JOINT REPLACEMENT     • LIVER BIOPSY  08/2018   • OTHER SURGICAL HISTORY      Cellulitis of left lug summer 2015   • REPLACEMENT TOTAL KNEE      left   • SKIN BIOPSY     • SKIN CANCER EXCISION  2015    REMOVED FROM FACE   • SKIN CANCER EXCISION     • UMBILICAL HERNIA REPAIR N/A 7/29/2016    Procedure: UMBILICAL HERNIA REPAIR;  Surgeon: Max Morocho MD;  Location: [facility] OR;  Service:    • VEIN LIGATION AND STRIPPING WITH LASER         No Known Allergies      Current Outpatient Medications:   •  apixaban (ELIQUIS) 5 MG tablet tablet, Take 5 mg by mouth 2 (two) times a day., Disp: , Rfl:   •  cholecalciferol (VITAMIN D3) 1000 UNITS tablet, Take 5,000 Units by mouth every morning., Disp: , Rfl:   •  eplerenone (INSPRA) 25 MG tablet, Take 1 tablet by mouth Daily., Disp: 30 tablet, Rfl: 11  •  Fluticasone Furoate-Vilanterol (BREO ELLIPTA) 200-25 MCG/INH aerosol powder , Inhale 1 puff Daily., Disp: 2 each, Rfl: 0  •  folic acid (FOLVITE) 1 MG tablet, Take 1 mg by mouth every morning., Disp: , Rfl:   •  furosemide (LASIX) 40 MG tablet, Take 1 tablet by mouth 2 (Two) Times a Day., Disp: 60 tablet, Rfl: 11  •  metoprolol succinate XL (TOPROL-XL) 25 MG 24 hr tablet, Take 1 tablet by mouth Every Morning., Disp: 30 tablet, Rfl: 11  •  O2 (OXYGEN), Inhale 2 L/min Daily., Disp: , Rfl:   •  vitamin B-12 (CYANOCOBALAMIN) 1000 MCG tablet, Take 2,500 mcg by mouth daily., Disp: , Rfl:     Current Facility-Administered Medications:   •  albuterol (PROVENTIL HFA;VENTOLIN HFA) inhaler 2 puff, 2 puff, Inhalation, Q4H PRN, Yossi Blake MD    The following portions of the chart  "were reviewed today and updated as appropriate: Allergies, current medications, past family history, social history, past medical history.     Review of Systems   Constitution: Positive for malaise/fatigue. Negative for chills, decreased appetite, diaphoresis, fever, weakness, night sweats, weight gain and weight loss.   HENT: Negative for congestion, hearing loss, hoarse voice and nosebleeds.    Eyes: Negative for blurred vision, visual disturbance and visual halos.   Cardiovascular: Positive for dyspnea on exertion, leg swelling and orthopnea. Negative for chest pain, claudication, cyanosis, irregular heartbeat, near-syncope, palpitations, paroxysmal nocturnal dyspnea and syncope.   Respiratory: Positive for shortness of breath. Negative for cough, hemoptysis, sleep disturbances due to breathing, snoring, sputum production and wheezing.    Hematologic/Lymphatic: Negative for bleeding problem. Bruises/bleeds easily.   Skin: Negative for dry skin, itching and rash.   Musculoskeletal: Negative for arthritis, falls, joint pain, joint swelling and myalgias.   Gastrointestinal: Positive for bloating and diarrhea. Negative for abdominal pain, constipation, flatus, heartburn, hematemesis, hematochezia, melena, nausea and vomiting.   Genitourinary: Positive for frequency. Negative for dysuria, hematuria, nocturia and urgency.   Neurological: Negative for excessive daytime sleepiness, dizziness, headaches, light-headedness and loss of balance.   Psychiatric/Behavioral: Negative for depression. The patient does not have insomnia and is not nervous/anxious.            Objective:     Vitals:    03/18/19 1236 03/18/19 1237 03/18/19 1238   BP: 134/62 125/57 120/59   BP Location: Right arm Left arm Left arm   Patient Position: Sitting Sitting Standing   Cuff Size:  Adult Adult   Pulse: 71  71   Resp: 18     Temp: 98.3 °F (36.8 °C)     TempSrc: Temporal     SpO2: 98%  97%   Weight: 106 kg (234 lb)     Height: 177.8 cm (70\")   "         Physical Exam   Constitutional: He is oriented to person, place, and time. He appears well-developed and well-nourished. He is active and cooperative. No distress.   HENT:   Head: Normocephalic and atraumatic.   Mouth/Throat: Oropharynx is clear and moist.   Eyes: Conjunctivae and EOM are normal. Pupils are equal, round, and reactive to light.   Neck: Normal range of motion. Neck supple. No JVD present. No tracheal deviation present. No thyromegaly present.   Cardiovascular: Normal rate, regular rhythm, normal heart sounds and intact distal pulses.   Pulmonary/Chest: Effort normal. He has rales.   Abdominal: Bowel sounds are normal. He exhibits distension. There is tenderness.   Musculoskeletal: Normal range of motion. He exhibits edema (2+ pitting edema noted BLEs).   Neurological: He is alert and oriented to person, place, and time.   Skin: Skin is warm, dry and intact.   Psychiatric: He has a normal mood and affect. His behavior is normal.   Nursing note and vitals reviewed.      Lab and Diagnostic Review:      Lab Results   Component Value Date    GLUCOSE 101 (H) 03/18/2019    CALCIUM 8.8 03/18/2019     03/18/2019    K 3.5 03/18/2019    CO2 23.0 03/18/2019     03/18/2019    BUN 16 03/18/2019    CREATININE 1.20 03/18/2019    EGFRIFNONA 60 (L) 03/18/2019    BCR 13.3 03/18/2019    ANIONGAP 9.0 03/18/2019        Lab Results   Component Value Date    GLUCOSE 101 (H) 03/18/2019    CALCIUM 8.8 03/18/2019     03/18/2019    K 3.5 03/18/2019    CO2 23.0 03/18/2019     03/18/2019    BUN 16 03/18/2019    CREATININE 1.20 03/18/2019    EGFRIFNONA 60 (L) 03/18/2019    BCR 13.3 03/18/2019    ANIONGAP 9.0 03/18/2019           Assessment and Plan:         1. Acute on chronic diastolic heart failure (CMS/HCC)  IV diuresis today in office. Patient received 80 mg lasix (NDC 9938-7843-69)  today through a butterfly  in the left AC over slow IV push. During IV diuresis, vitals were monitored and stable.  Please see IV diuresis record for those vitals. Patient voided 225 ml in the office prior to discharge from the office. Butterfly was d/c'd and area was free of erythema, ecchymosis, or drainage.  Patient was  instructed to record urinary output for the next 24 hours. Patient will receive a follow up call from the HF center in 24 hours to evaluate urinary output and reassess signs and symptoms.   - Basic Metabolic Panel  Heart failure education today including signs and symptoms, the role of the heart failure center, daily weights, low sodium diet (less than 1500 mg per day), and daily physical activity. Reviewed HF Zones with patient and family.  Patient to continue current medications as previously ordered.   2. 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 Splenomegaly    Patient to be seen tomorrow at [facility] for further evaluation    It has been a pleasure to participate in the care of this patient.  Patient was instructed to call the [facility] with any questions, concerns, or worsening symptoms.        * Please note that portions of this note were completed with a voice recognition program. Efforts were made to edit the dictation but occasionally words are transcribed.

## 2019-03-15 NOTE — TELEPHONE ENCOUNTER
Wife called stating pt is scheduled to return to [facility] for appt with surgeon for splenectomy. Pt had been feeling well for the last 2 months until Wednesday when he started retaining fluid again. She states it is back in his legs and abdomen. He continues on furosemide 40 mg BID and Inspra 25 mg daily. She feels he needs IV diuresis but cannot bring him today. She would like to know if this can be done over the weekend as an outpt. I was able to make an appt for Monday at 1230 with the [facility] on the 5 th floor to see if IV diuresis could be done in their office. I called him with appt info and location. He will keep this appt and leave for [facility] after appt. YASHIRA

## 2019-03-18 ENCOUNTER — OFFICE VISIT (OUTPATIENT)
Dept: CARDIOLOGY | Facility: HOSPITAL | Age: 69
End: 2019-03-18

## 2019-03-18 VITALS
DIASTOLIC BLOOD PRESSURE: 59 MMHG | TEMPERATURE: 98.3 F | OXYGEN SATURATION: 97 % | WEIGHT: 234 LBS | HEART RATE: 71 BPM | SYSTOLIC BLOOD PRESSURE: 120 MMHG | RESPIRATION RATE: 18 BRPM | HEIGHT: 70 IN | BODY MASS INDEX: 33.5 KG/M2

## 2019-03-18 DIAGNOSIS — D73.2 SPLENOMEGALY, CONGESTIVE, CHRONIC: ICD-10-CM

## 2019-03-18 DIAGNOSIS — I50.33 ACUTE ON CHRONIC DIASTOLIC HEART FAILURE (HCC): Primary | ICD-10-CM

## 2019-03-18 DIAGNOSIS — I42.5 RESTRICTIVE CARDIOMYOPATHY (HCC): ICD-10-CM

## 2019-03-18 LAB
ANION GAP SERPL CALCULATED.3IONS-SCNC: 9 MMOL/L (ref 3–11)
BUN BLD-MCNC: 16 MG/DL (ref 9–23)
BUN/CREAT SERPL: 13.3 (ref 7–25)
CALCIUM SPEC-SCNC: 8.8 MG/DL (ref 8.7–10.4)
CHLORIDE SERPL-SCNC: 108 MMOL/L (ref 99–109)
CO2 SERPL-SCNC: 23 MMOL/L (ref 20–31)
CREAT BLD-MCNC: 1.2 MG/DL (ref 0.6–1.3)
GFR SERPL CREATININE-BSD FRML MDRD: 60 ML/MIN/1.73
GLUCOSE BLD-MCNC: 101 MG/DL (ref 70–100)
POTASSIUM BLD-SCNC: 3.5 MMOL/L (ref 3.5–5.5)
SODIUM BLD-SCNC: 140 MMOL/L (ref 132–146)

## 2019-03-18 PROCEDURE — 80048 BASIC METABOLIC PNL TOTAL CA: CPT | Performed by: NURSE PRACTITIONER

## 2019-03-18 PROCEDURE — 99214 OFFICE O/P EST MOD 30 MIN: CPT | Performed by: NURSE PRACTITIONER

## 2019-03-19 ENCOUNTER — TELEPHONE (OUTPATIENT)
Dept: CARDIOLOGY | Facility: HOSPITAL | Age: 69
End: 2019-03-19

## 2019-03-19 NOTE — TELEPHONE ENCOUNTER
----- Message from SYDNEE Daley sent at 3/18/2019  2:07 PM EDT -----  [phone], cell   Follow up call s/p IV diuresis: patient notes improvement in his dyspnea, abdominal fullness and pedal edema. Patient to continue lasix as directed. He was instructed to call the office with any worsening symptoms, questions or concerns.

## 2019-03-27 PROBLEM — D58.1 HEREDITARY ELLIPTOCYTOSIS: Status: ACTIVE | Noted: 2019-03-27

## 2019-03-27 NOTE — PROGRESS NOTES
Receive letter from Dr. Tolbert from the [facility].  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  This would account for the enlarged spleen.  Patient has been scheduled to undergo a laparoscopic splenectomy in April at the [facility].

## 2019-03-29 ENCOUNTER — OFFICE VISIT (OUTPATIENT)
Dept: CARDIOLOGY | Facility: HOSPITAL | Age: 69
End: 2019-03-29

## 2019-03-29 VITALS
SYSTOLIC BLOOD PRESSURE: 130 MMHG | HEIGHT: 70 IN | BODY MASS INDEX: 33.07 KG/M2 | RESPIRATION RATE: 14 BRPM | TEMPERATURE: 97.9 F | HEART RATE: 85 BPM | OXYGEN SATURATION: 94 % | DIASTOLIC BLOOD PRESSURE: 59 MMHG | WEIGHT: 231 LBS

## 2019-03-29 DIAGNOSIS — I50.33 ACUTE ON CHRONIC DIASTOLIC CHF (CONGESTIVE HEART FAILURE) (HCC): Primary | ICD-10-CM

## 2019-03-29 DIAGNOSIS — I27.20 PULMONARY HYPERTENSION (HCC): ICD-10-CM

## 2019-03-29 DIAGNOSIS — D73.2 SPLENOMEGALY, CONGESTIVE, CHRONIC: ICD-10-CM

## 2019-03-29 LAB
ANION GAP SERPL CALCULATED.3IONS-SCNC: 9 MMOL/L (ref 3–11)
BUN BLD-MCNC: 14 MG/DL (ref 9–23)
BUN/CREAT SERPL: 12.2 (ref 7–25)
CALCIUM SPEC-SCNC: 8.9 MG/DL (ref 8.7–10.4)
CHLORIDE SERPL-SCNC: 107 MMOL/L (ref 99–109)
CO2 SERPL-SCNC: 23 MMOL/L (ref 20–31)
CREAT BLD-MCNC: 1.15 MG/DL (ref 0.6–1.3)
GFR SERPL CREATININE-BSD FRML MDRD: 63 ML/MIN/1.73
GLUCOSE BLD-MCNC: 94 MG/DL (ref 70–100)
POTASSIUM BLD-SCNC: 3.5 MMOL/L (ref 3.5–5.5)
SODIUM BLD-SCNC: 139 MMOL/L (ref 132–146)

## 2019-03-29 PROCEDURE — 80048 BASIC METABOLIC PNL TOTAL CA: CPT | Performed by: NURSE PRACTITIONER

## 2019-03-29 PROCEDURE — 99214 OFFICE O/P EST MOD 30 MIN: CPT | Performed by: NURSE PRACTITIONER

## 2019-04-01 NOTE — PROGRESS NOTES
Encounter Date:03/29/2019      Patient ID: Simth Craven is a 68 y.o. male.        Subjective:     Chief Complaint: Follow-up (diastolic heart failure)     History of Present Illness  Patient presents to the office today for ongoing evaluation of his diastolic heart failure. He notes worsening pedal edema,dyspnea and abdominal fullness. He is scheduled for a splenectomy at [facility] mid April. He denies chest pain. Notes compliance with his medications. Notes a 4-5 lb weight gain over the last week.     Patient Active Problem List   Diagnosis   • Non-obstructive coronary artery disease   • Atrial fibrillation (CMS/HCC)   • Neurocardiogenic syncope   • SOB (shortness of breath)   • Calculus of gallbladder with biliary obstruction but without cholecystitis   • Bronchitis   • Obesity (BMI 30-39.9)   • Iron deficiency anemia   • Reactive airway disease   • ANN MARIE on CPAP   • Peripheral venous insufficiency   • COPD (chronic obstructive pulmonary disease) (CMS/HCC)   • Mitral valve regurgitation   • Gilbert's syndrome   • Splenomegaly   • Pleural effusion, bilateral   • Fatty liver- No cirrhosis by BX   • Mitral valve regurgitation, MILD MR   • Chronic anemia   • Pericardial effusion   • Mitral valve insufficiency   • Pulmonary hypertension (CMS/HCC)   • 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 NAPIER   • CARDIAC CATHETERIZATION Bilateral 6/17/2016    Procedure: Right and possible Left Heart Cath;  Surgeon: Brant Napier MD;  Location:  [facility] INVASIVE LOCATION;  Service:    • CARDIAC CATHETERIZATION N/A 1/11/2019    Procedure: Right Heart Cath, no eliquis hold;  Surgeon: Brant Napier MD;  Location:  [facility] CATH INVASIVE LOCATION;  Service: Cardiology   • CATARACT EXTRACTION Right 2016   • CHOLECYSTECTOMY  08/2016   • CHOLECYSTECTOMY WITH INTRAOPERATIVE  CHOLANGIOGRAM N/A 7/29/2016    Procedure: CHOLECYSTECTOMY LAPAROSCOPIC INTRAOPERATIVE CHOLANGIOGRAM;  Surgeon: Max Morocho MD;  [facility]:  [facility];  Service:    • COLONOSCOPY     • ERCP N/A 7/28/2016    Procedure: ENDOSCOPIC RETROGRADE CHOLANGIOPANCREATOGRAPHY;  Surgeon: Oli George III, MD;  Location:  [facility];  Service:    • FRACTURE SURGERY     • JOINT REPLACEMENT     • LIVER BIOPSY  08/2018   • OTHER SURGICAL HISTORY      Cellulitis of left lug summer 2015   • REPLACEMENT TOTAL KNEE      left   • SKIN BIOPSY     • SKIN CANCER EXCISION  2015    REMOVED FROM FACE   • SKIN CANCER EXCISION     • UMBILICAL HERNIA REPAIR N/A 7/29/2016    Procedure: UMBILICAL HERNIA REPAIR;  Surgeon: Max Morocho MD;  Location:  [facility];  Service:    • VEIN LIGATION AND STRIPPING WITH LASER         No Known Allergies      Current Outpatient Medications:   •  apixaban (ELIQUIS) 5 MG tablet tablet, Take 5 mg by mouth 2 (two) times a day., Disp: , Rfl:   •  cholecalciferol (VITAMIN D3) 1000 UNITS tablet, Take 5,000 Units by mouth every morning., Disp: , Rfl:   •  eplerenone (INSPRA) 25 MG tablet, Take 1 tablet by mouth Daily., Disp: 30 tablet, Rfl: 11  •  Fluticasone Furoate-Vilanterol (BREO ELLIPTA) 200-25 MCG/INH aerosol powder , Inhale 1 puff Daily., Disp: 2 each, Rfl: 0  •  folic acid (FOLVITE) 1 MG tablet, Take 1 mg by mouth every morning., Disp: , Rfl:   •  furosemide (LASIX) 40 MG tablet, Take 1 tablet by mouth 2 (Two) Times a Day., Disp: 60 tablet, Rfl: 11  •  metoprolol succinate XL (TOPROL-XL) 25 MG 24 hr tablet, Take 1 tablet by mouth Every Morning., Disp: 30 tablet, Rfl: 11  •  O2 (OXYGEN), Inhale 2 L/min Daily., Disp: , Rfl:   •  vitamin B-12 (CYANOCOBALAMIN) 1000 MCG tablet, Take 2,500 mcg by mouth daily., Disp: , Rfl:     Current Facility-Administered Medications:   •  albuterol (PROVENTIL HFA;VENTOLIN HFA) inhaler 2 puff, 2 puff, Inhalation, Q4H PRN, Yossi Blake MD    The following  "portions of the chart were reviewed today and updated as appropriate: Allergies, current medications, past family history, social history, past medical history.     Review of Systems   Constitution: Positive for malaise/fatigue. Negative for chills, decreased appetite, diaphoresis, fever, weakness, night sweats, weight gain and weight loss.   HENT: Negative for congestion, hearing loss, hoarse voice and nosebleeds.    Eyes: Negative for blurred vision, visual disturbance and visual halos.   Cardiovascular: Positive for dyspnea on exertion and leg swelling. Negative for chest pain, claudication, cyanosis, irregular heartbeat, near-syncope, orthopnea, palpitations, paroxysmal nocturnal dyspnea and syncope.   Respiratory: Negative for cough, hemoptysis, shortness of breath, sleep disturbances due to breathing, snoring, sputum production and wheezing.    Hematologic/Lymphatic: Negative for bleeding problem. Does not bruise/bleed easily.   Skin: Negative for dry skin, itching and rash.   Musculoskeletal: Positive for falls. Negative for arthritis, joint pain, joint swelling and myalgias.   Gastrointestinal: Positive for bloating. Negative for abdominal pain, constipation, diarrhea, flatus, heartburn, hematemesis, hematochezia, melena, nausea and vomiting.   Genitourinary: Negative for dysuria, frequency, hematuria, nocturia and urgency.   Neurological: Negative for excessive daytime sleepiness, dizziness, headaches, light-headedness and loss of balance.   Psychiatric/Behavioral: Negative for depression. The patient does not have insomnia and is not nervous/anxious.            Objective:     Vitals:    03/29/19 1409   BP: 130/59   BP Location: Right arm   Patient Position: Sitting   Cuff Size: Adult   Pulse: 85   Resp: 14   Temp: 97.9 °F (36.6 °C)   TempSrc: Temporal   SpO2: 94%   Weight: 105 kg (231 lb)   Height: 177.8 cm (70\")         Physical Exam   Constitutional: He is oriented to person, place, and time. He appears " well-developed and well-nourished. He is active and cooperative. No distress.   HENT:   Head: Normocephalic and atraumatic.   Mouth/Throat: Oropharynx is clear and moist.   Eyes: Conjunctivae and EOM are normal. Pupils are equal, round, and reactive to light.   Neck: Normal range of motion. Neck supple. No JVD present. No tracheal deviation present. No thyromegaly present.   Cardiovascular: Normal rate, regular rhythm, normal heart sounds and intact distal pulses.   Pulmonary/Chest: Effort normal. He has rales.   Abdominal: Soft. Bowel sounds are normal. He exhibits distension. There is tenderness.   Musculoskeletal: Normal range of motion. He exhibits edema (1+).   Neurological: He is alert and oriented to person, place, and time.   Skin: Skin is warm, dry and intact.   Yellow sclera bilaterally    Psychiatric: He has a normal mood and affect. His behavior is normal.   Nursing note and vitals reviewed.      Lab and Diagnostic Review:      Lab Results   Component Value Date    GLUCOSE 94 03/29/2019    CALCIUM 8.9 03/29/2019     03/29/2019    K 3.5 03/29/2019    CO2 23.0 03/29/2019     03/29/2019    BUN 14 03/29/2019    CREATININE 1.15 03/29/2019    EGFRIFNONA 63 03/29/2019    BCR 12.2 03/29/2019    ANIONGAP 9.0 03/29/2019         Assessment and Plan:         1. Acute on chronic diastolic CHF (congestive heart failure) (CMS/[facility])  IV diuresis today in office. Patient received 80 mg lasix  today through a  Butterfly in the left ac over slow IV push. During IV diuresis, vitals were monitored and stable. Please see IV diuresis record for those vitals. Patient voided 300 ml in the office prior to discharge from the office. IV was d/c'd and area was free of erythema, ecchymosis, or drainage.  Patient was  instructed to record urinary output for the next 24 hours. Patient will receive a follow up call from the HF center in 24 hours to evaluate urinary output and reassess signs and symptoms.    - Basic Metabolic  Panel    2. Splenomegaly   Scheduled for upcoming splenectomy at [facility]  3. Pulmonary hypertension (CMS/HCC)    Stable    It has been a pleasure to participate in the care of this patient.  Patient was instructed to call the [facility] with any questions, concerns, or worsening symptoms.    * Please note that portions of this note were completed with a voice recognition program. Efforts were made to edit the dictation but occasionally words are transcribed.

## 2019-04-08 ENCOUNTER — OFFICE VISIT (OUTPATIENT)
Dept: CARDIOLOGY | Facility: HOSPITAL | Age: 69
End: 2019-04-08

## 2019-04-08 VITALS
HEART RATE: 79 BPM | BODY MASS INDEX: 32.5 KG/M2 | DIASTOLIC BLOOD PRESSURE: 62 MMHG | OXYGEN SATURATION: 85 % | HEIGHT: 70 IN | RESPIRATION RATE: 14 BRPM | WEIGHT: 227 LBS | SYSTOLIC BLOOD PRESSURE: 140 MMHG | TEMPERATURE: 96.9 F

## 2019-04-08 DIAGNOSIS — D58.1 HEREDITARY ELLIPTOCYTOSIS (HCC): ICD-10-CM

## 2019-04-08 DIAGNOSIS — I48.0 PAROXYSMAL ATRIAL FIBRILLATION (HCC): ICD-10-CM

## 2019-04-08 DIAGNOSIS — I50.33 ACUTE ON CHRONIC DIASTOLIC CHF (CONGESTIVE HEART FAILURE) (HCC): Primary | ICD-10-CM

## 2019-04-08 LAB
ANION GAP SERPL CALCULATED.3IONS-SCNC: 19.9 MMOL/L
BUN BLD-MCNC: 18 MG/DL (ref 8–23)
BUN/CREAT SERPL: 17.3 (ref 7–25)
CALCIUM SPEC-SCNC: 8.8 MG/DL (ref 8.6–10.5)
CHLORIDE SERPL-SCNC: 103 MMOL/L (ref 98–107)
CO2 SERPL-SCNC: 19.1 MMOL/L (ref 22–29)
CREAT BLD-MCNC: 1.04 MG/DL (ref 0.76–1.27)
GFR SERPL CREATININE-BSD FRML MDRD: 71 ML/MIN/1.73
GLUCOSE BLD-MCNC: 75 MG/DL (ref 65–99)
POTASSIUM BLD-SCNC: 3.9 MMOL/L (ref 3.5–5.2)
SODIUM BLD-SCNC: 142 MMOL/L (ref 136–145)

## 2019-04-08 PROCEDURE — 80048 BASIC METABOLIC PNL TOTAL CA: CPT | Performed by: NURSE PRACTITIONER

## 2019-04-08 PROCEDURE — 99214 OFFICE O/P EST MOD 30 MIN: CPT | Performed by: NURSE PRACTITIONER

## 2019-04-08 NOTE — PROGRESS NOTES
Encounter Date:04/08/2019      Patient ID: Smith Craven is a 68 y.o. male.        Subjective:     Chief Complaint: Shortness of Breath and Edema     History of Present Illness patient presents to the office today as an add-on for ongoing evaluation of his diastolic heart failure. He notes that he had a rough weekend and notes that he had dyspnea at rest and with exertion. He notes that he turned his oxygen up to 3 liters during the day on Saturday. He also notes abdominal fullness, pedal edema, orthopnea and pnd over the last few days. He is scheduled to leave for [place] next Monday to undergo his splenectomy. He denies cp, dizziness, presyncope, syncope.     Patient Active Problem List   Diagnosis   • Non-obstructive coronary artery disease   • Atrial fibrillation (CMS/HCC)   • Neurocardiogenic syncope   • SOB (shortness of breath)   • Calculus of gallbladder with biliary obstruction but without cholecystitis   • Bronchitis   • Obesity (BMI 30-39.9)   • Iron deficiency anemia   • Reactive airway disease   • ANN MARIE on CPAP   • Peripheral venous insufficiency   • COPD (chronic obstructive pulmonary disease) (CMS/HCC)   • Mitral valve regurgitation   • Gilbert's syndrome   • Splenomegaly   • Pleural effusion, bilateral   • Fatty liver- No cirrhosis by BX   • Mitral valve regurgitation, MILD MR   • Chronic anemia   • Pericardial effusion   • Mitral valve insufficiency   • Pulmonary hypertension (CMS/HCC)   • 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 NAPIER   • CARDIAC CATHETERIZATION Bilateral 6/17/2016    Procedure: Right and possible Left Heart Cath;  Surgeon: Brant Napier MD;  Location: [facility] INVASIVE LOCATION;  Service:    • CARDIAC CATHETERIZATION N/A 1/11/2019    Procedure: Right Heart Cath, no eliquis hold;  Surgeon: Brant Napier MD;  Location:   [facility] CATH INVASIVE LOCATION;  Service: Cardiology   • CATARACT EXTRACTION Right 2016   • CHOLECYSTECTOMY  08/2016   • CHOLECYSTECTOMY WITH INTRAOPERATIVE CHOLANGIOGRAM N/A 7/29/2016    Procedure: CHOLECYSTECTOMY LAPAROSCOPIC INTRAOPERATIVE CHOLANGIOGRAM;  Surgeon: Max Morocho MD;  Location:  [facility];  Service:    • COLONOSCOPY     • ERCP N/A 7/28/2016    Procedure: ENDOSCOPIC RETROGRADE CHOLANGIOPANCREATOGRAPHY;  Surgeon: Oli George III, MD;  Location:  [facility];  Service:    • FRACTURE SURGERY     • JOINT REPLACEMENT     • LIVER BIOPSY  08/2018   • OTHER SURGICAL HISTORY      Cellulitis of left lug summer 2015   • REPLACEMENT TOTAL KNEE      left   • SKIN BIOPSY     • SKIN CANCER EXCISION  2015    REMOVED FROM FACE   • SKIN CANCER EXCISION     • UMBILICAL HERNIA REPAIR N/A 7/29/2016    Procedure: UMBILICAL HERNIA REPAIR;  Surgeon: Max Morocho MD;  Location: [facility] OR;  Service:    • VEIN LIGATION AND STRIPPING WITH LASER         No Known Allergies      Current Outpatient Medications:   •  apixaban (ELIQUIS) 5 MG tablet tablet, Take 5 mg by mouth 2 (two) times a day., Disp: , Rfl:   •  cholecalciferol (VITAMIN D3) 1000 UNITS tablet, Take 5,000 Units by mouth every morning., Disp: , Rfl:   •  eplerenone (INSPRA) 25 MG tablet, Take 1 tablet by mouth Daily., Disp: 30 tablet, Rfl: 11  •  Fluticasone Furoate-Vilanterol (BREO ELLIPTA) 200-25 MCG/INH aerosol powder , Inhale 1 puff Daily., Disp: 2 each, Rfl: 0  •  folic acid (FOLVITE) 1 MG tablet, Take 1 mg by mouth every morning., Disp: , Rfl:   •  furosemide (LASIX) 40 MG tablet, Take 1 tablet by mouth 2 (Two) Times a Day., Disp: 60 tablet, Rfl: 11  •  metoprolol succinate XL (TOPROL-XL) 25 MG 24 hr tablet, Take 1 tablet by mouth Every Morning., Disp: 30 tablet, Rfl: 11  •  O2 (OXYGEN), Inhale 2 L/min Daily., Disp: , Rfl:   •  vitamin B-12 (CYANOCOBALAMIN) 1000 MCG tablet, Take 2,500 mcg by mouth daily., Disp: , Rfl:     Current  Facility-Administered Medications:   •  albuterol (PROVENTIL HFA;VENTOLIN HFA) inhaler 2 puff, 2 puff, Inhalation, Q4H PRN, Yossi Blake MD    The following portions of the chart were reviewed today and updated as appropriate: Allergies, current medications, past family history, social history, past medical history.     Review of Systems   Constitution: Positive for malaise/fatigue. Negative for chills, decreased appetite, diaphoresis, fever, weakness, night sweats, weight gain and weight loss.   HENT: Negative for congestion, hearing loss, hoarse voice and nosebleeds.    Eyes: Negative for blurred vision, visual disturbance and visual halos.   Cardiovascular: Positive for dyspnea on exertion, leg swelling, orthopnea and paroxysmal nocturnal dyspnea. Negative for chest pain, claudication, cyanosis, irregular heartbeat, near-syncope, palpitations and syncope.   Respiratory: Positive for cough and shortness of breath. Negative for hemoptysis, sleep disturbances due to breathing, snoring, sputum production and wheezing.    Hematologic/Lymphatic: Negative for bleeding problem. Bruises/bleeds easily.   Skin: Negative for dry skin, itching and rash.   Musculoskeletal: Negative for arthritis, falls, joint pain, joint swelling and myalgias.   Gastrointestinal: Positive for bloating. Negative for abdominal pain, constipation, diarrhea, flatus, heartburn, hematemesis, hematochezia, melena, nausea and vomiting.   Genitourinary: Positive for frequency. Negative for dysuria, hematuria, nocturia and urgency.   Neurological: Negative for excessive daytime sleepiness, dizziness, headaches, light-headedness and loss of balance.   Psychiatric/Behavioral: Negative for depression. The patient does not have insomnia and is not nervous/anxious.    Allergic/Immunologic: Positive for environmental allergies.           Objective:     Vitals:    04/08/19 1150   BP: 140/62   BP Location: Right arm   Patient Position: Sitting  "  Cuff Size: Adult   Pulse: 79   Resp: 14   Temp: 96.9 °F (36.1 °C)   TempSrc: Temporal   SpO2: (!) 85%   Weight: 103 kg (227 lb)   Height: 177.8 cm (70\")         Physical Exam   Constitutional: He is oriented to person, place, and time. He appears well-developed and well-nourished. He is active and cooperative. No distress.   HENT:   Head: Normocephalic and atraumatic.   Mouth/Throat: Oropharynx is clear and moist.   Eyes: Conjunctivae and EOM are normal. Pupils are equal, round, and reactive to light.   Neck: Normal range of motion. Neck supple. No JVD present. No tracheal deviation present. No thyromegaly present.   Cardiovascular: Normal rate, regular rhythm, normal heart sounds and intact distal pulses.   Pulmonary/Chest: Effort normal. He has rales.   Abdominal: Soft. Bowel sounds are normal. He exhibits distension. There is tenderness.   Musculoskeletal: Normal range of motion.   Neurological: He is alert and oriented to person, place, and time.   Skin: Skin is warm, dry and intact.   Jaundiced    Psychiatric: He has a normal mood and affect. His behavior is normal.       Lab and Diagnostic Review:      Lab Results   Component Value Date    GLUCOSE 94 03/29/2019    CALCIUM 8.9 03/29/2019     03/29/2019    K 3.5 03/29/2019    CO2 23.0 03/29/2019     03/29/2019    BUN 14 03/29/2019    CREATININE 1.15 03/29/2019    EGFRIFNONA 63 03/29/2019    BCR 12.2 03/29/2019    ANIONGAP 9.0 03/29/2019     Bmp pending from today     Assessment and Plan:         1. Acute on chronic diastolic CHF (congestive heart failure) (CMS/[facility])  IV diuresis today in office. Patient received 1 mg bumex (NDC 9223-9584-98)  today through a butterfly  in the left forearm  over slow IV push. During IV diuresis, vitals were monitored and stable. Please see IV diuresis record for those vitals. Patient voided 250 ml in the office prior to discharge from the office. Butterfly  was d/c'd and area was free of erythema, ecchymosis, or " drainage.  Patient was  instructed to record urinary output for the next 24 hours. Patient will receive a follow up call from the HF center in 24 hours to evaluate urinary output and reassess signs and symptoms.     - Basic Metabolic Panel  Heart failure education today including signs and symptoms, the role of the heart failure center, daily weights, low sodium diet (less than 1500 mg per day), and daily physical activity. Reviewed HF Zones with patient and family.  Patient to continue current medications as previously ordered.   2. Paroxysmal atrial fibrillation (CMS/HCC)  Maintaining nsr   CHADS-VASc Risk Assessment            3       Total Score        1 CHF    1 Vascular Disease    1 Age 65-74         Anticoagulated with eliquis and denies any s/s of bleeding     3. Hereditary elliptocytosis (CMS/HCC)  Has established at [facility] and will be undergoing a splenectomy in the near future    It has been a pleasure to participate in the care of this patient.  Patient was instructed to call the [facility] with any questions, concerns, or worsening symptoms.        * Please note that portions of this note were completed with a voice recognition program. Efforts were made to edit the dictation but occasionally words are transcribed.

## 2019-04-09 ENCOUNTER — TELEPHONE (OUTPATIENT)
Dept: CARDIOLOGY | Facility: HOSPITAL | Age: 69
End: 2019-04-09

## 2019-04-09 RX ORDER — METOLAZONE 2.5 MG/1
2.5 TABLET ORAL DAILY
Qty: 3 TABLET | Refills: 0 | Status: SHIPPED | OUTPATIENT
Start: 2019-04-09 | End: 2019-07-30

## 2019-04-09 NOTE — TELEPHONE ENCOUNTER
Spoke with patient's wife who notes improvement since IV diuresis yesterday.  She notes he was able to lie down propped up on pillows and sleep last night which he has not been able to do recently.  Patient to be on metolazone 2.5 mg 1 p.o. daily for 3 days starting tomorrow and continue through Friday.  Patient to continue Lasix 40 mg twice daily. Patient will receive a follow-up call from the heart failure center on Friday to reassess symptoms.  Patient's wife verbalized understanding.

## 2019-04-12 ENCOUNTER — TELEPHONE (OUTPATIENT)
Dept: CARDIOLOGY | Facility: HOSPITAL | Age: 69
End: 2019-04-12

## 2019-04-12 NOTE — TELEPHONE ENCOUNTER
----- Message from Anisa Ahumada MA sent at 4/12/2019 12:25 PM EDT -----  Called patient who states that he has not been having any SOB, he has been urinating more but states that the metolazone has helped the removal of fluid and he has not had any additional swelling. He states that he is going to have his spleen removed on Thursday and blood transfusion on Tuesday. They are making sure he has no additional swelling.    ----- Message -----  From: Juliette Wu APRN  Sent: 4/12/2019  11:54 AM  To: MRACIE Sullivan,  Please call Mr Craven and see if his hf symptoms improved after taking metolazone. thanks  ----- Message -----  From: Juliette Wu APRN  Sent: 4/12/2019  To: SYDNEE Daley    Metolazone w,th and Friday

## 2019-07-30 ENCOUNTER — OFFICE VISIT (OUTPATIENT)
Dept: CARDIOLOGY | Facility: CLINIC | Age: 69
End: 2019-07-30

## 2019-07-30 ENCOUNTER — OFFICE VISIT (OUTPATIENT)
Dept: PULMONOLOGY | Facility: CLINIC | Age: 69
End: 2019-07-30

## 2019-07-30 VITALS
SYSTOLIC BLOOD PRESSURE: 112 MMHG | OXYGEN SATURATION: 94 % | WEIGHT: 219.4 LBS | BODY MASS INDEX: 31.41 KG/M2 | HEIGHT: 70 IN | DIASTOLIC BLOOD PRESSURE: 80 MMHG | TEMPERATURE: 97.2 F | HEART RATE: 59 BPM

## 2019-07-30 VITALS
DIASTOLIC BLOOD PRESSURE: 76 MMHG | HEIGHT: 70 IN | SYSTOLIC BLOOD PRESSURE: 130 MMHG | BODY MASS INDEX: 31.35 KG/M2 | WEIGHT: 219 LBS | HEART RATE: 56 BPM

## 2019-07-30 DIAGNOSIS — I27.20 PULMONARY HYPERTENSION (HCC): Primary | ICD-10-CM

## 2019-07-30 DIAGNOSIS — I48.0 PAROXYSMAL ATRIAL FIBRILLATION (HCC): Primary | ICD-10-CM

## 2019-07-30 DIAGNOSIS — Z99.89 OSA ON CPAP: ICD-10-CM

## 2019-07-30 DIAGNOSIS — G47.33 OSA ON CPAP: ICD-10-CM

## 2019-07-30 DIAGNOSIS — I48.0 PAROXYSMAL ATRIAL FIBRILLATION (HCC): ICD-10-CM

## 2019-07-30 DIAGNOSIS — J44.9 CHRONIC OBSTRUCTIVE PULMONARY DISEASE, UNSPECIFIED COPD TYPE (HCC): ICD-10-CM

## 2019-07-30 DIAGNOSIS — I34.0 MITRAL VALVE INSUFFICIENCY, UNSPECIFIED ETIOLOGY: ICD-10-CM

## 2019-07-30 PROCEDURE — 99213 OFFICE O/P EST LOW 20 MIN: CPT | Performed by: INTERNAL MEDICINE

## 2019-07-30 PROCEDURE — 99214 OFFICE O/P EST MOD 30 MIN: CPT | Performed by: NURSE PRACTITIONER

## 2019-07-30 RX ORDER — EPLERENONE 25 MG/1
25 TABLET, FILM COATED ORAL
Qty: 30 TABLET | Refills: 11 | Status: SHIPPED | OUTPATIENT
Start: 2019-07-30 | End: 2020-06-24 | Stop reason: SDUPTHER

## 2019-07-30 RX ORDER — METOPROLOL SUCCINATE 25 MG/1
25 TABLET, EXTENDED RELEASE ORAL EVERY MORNING
Qty: 30 TABLET | Refills: 11 | Status: SHIPPED | OUTPATIENT
Start: 2019-07-30 | End: 2020-01-27

## 2019-07-30 RX ORDER — POTASSIUM CHLORIDE 750 MG/1
10 TABLET, FILM COATED, EXTENDED RELEASE ORAL DAILY
COMMUNITY
End: 2020-02-18

## 2019-07-30 NOTE — PROGRESS NOTES
[facility] Pulmonary Follow up    CHIEF COMPLAINT    COPD/ANN MARIE    HISTORY OF PRESENT ILLNESS    Smith Craven is a 68 y.o.male here today for follow-up of his COPD and obstructive sleep apnea.  He was last seen in office in January by me.  Since that time he was hospitalized shortly after his last visit for a CHF exacerbation.  He states that he was diuresed and essentially his symptoms are improved.  He was referred to [facility] after his discharge for possible splenectomy.  He had his splenectomy performed on 4/18 at [facility].  He states that since his surgery all of his symptoms have resolved.    He remains on Breo 200 daily for his COPD.  He denies any worsening shortness of breath.  At his last appointment he was on continuous oxygen.  He states that he has not been wearing oxygen since his surgery in April.  He denies any shortness of breath with activity.    He has a CPAP for obstructive sleep apnea.  He states that he has not been wearing this at night due to not needing it.  He denies any daytime somnolence or fatigue.  He feels well rested in the mornings.    He follows up with Dr. Napier later today for his CHF and atrial fibrillation.    He denies a fever, chills, sputum production, hemoptysis, night sweats, weight loss, chest pain or palpitations.  He denies any lower extremity edema currently.  He denies any sinus or allergy symptoms.  He denies any reflux symptoms.    He is up-to-date on his current vaccinations.    Patient Active Problem List   Diagnosis   • Non-obstructive coronary artery disease   • Atrial fibrillation (CMS/HCC)   • Neurocardiogenic syncope   • SOB (shortness of breath)   • Calculus of gallbladder with biliary obstruction but without cholecystitis   • Bronchitis   • Obesity (BMI 30-39.9)   • Iron deficiency anemia   • Reactive airway disease   • ANN MARIE on CPAP   • Peripheral venous insufficiency   • COPD (chronic obstructive pulmonary disease) (CMS/HCC)   • Mitral valve  regurgitation   • Gilbert's syndrome   • Splenomegaly   • Pleural effusion, bilateral   • Fatty liver- No cirrhosis by BX   • Mitral valve regurgitation, MILD MR   • Chronic anemia   • Pericardial effusion   • Mitral valve insufficiency   • Pulmonary hypertension (CMS/HCC)   • 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 (Patient taking differently: Take 40 mg by mouth Daily.), Disp: 60 tablet, Rfl: 11  •  vitamin B-12 (CYANOCOBALAMIN) 1000 MCG tablet, Take 2,500 mcg by mouth daily., Disp: , Rfl:   •  apixaban (ELIQUIS) 5 MG tablet tablet, Take 1 tablet by mouth Every 12 (Twelve) Hours., Disp: 60 tablet, Rfl: 11  •  eplerenone (INSPRA) 25 MG tablet, Take 1 tablet by mouth Daily., Disp: 30 tablet, Rfl: 11  •  metoprolol succinate XL (TOPROL-XL) 25 MG 24 hr tablet, Take 1 tablet by mouth Every Morning., Disp: 30 tablet, Rfl: 11  •  O2 (OXYGEN), Inhale 2 L/min Daily., Disp: , Rfl:   •  potassium chloride (K-DUR) 10 MEQ CR tablet, Take 10 mEq by mouth Daily., Disp: , Rfl:     Current Facility-Administered Medications:   •  albuterol (PROVENTIL HFA;VENTOLIN HFA) inhaler 2 puff, 2 puff, Inhalation, Q4H PRN, Yossi Blake MD  MEDICATION LIST AND ALLERGIES REVIEWED.    Social History     Tobacco Use   • Smoking status: Never Smoker   • Smokeless tobacco: Never Used   Substance Use Topics   • Alcohol use: No   • Drug use: No       FAMILY AND SOCIAL HISTORY REVIEWED.    Review of Systems   Constitutional: Negative for activity  "change, appetite change, fatigue, fever and unexpected weight change.   HENT: Negative for congestion, postnasal drip, rhinorrhea, sinus pressure, sore throat and voice change.    Eyes: Negative for visual disturbance.   Respiratory: Negative for cough, chest tightness, shortness of breath and wheezing.    Cardiovascular: Negative for chest pain, palpitations and leg swelling.   Gastrointestinal: Negative for abdominal distention, abdominal pain, nausea and vomiting.   Endocrine: Positive for cold intolerance. Negative for heat intolerance.   Genitourinary: Positive for frequency. Negative for difficulty urinating and urgency.   Musculoskeletal: Negative for arthralgias, back pain and neck pain.   Skin: Positive for wound. Negative for color change and pallor.   Allergic/Immunologic: Positive for environmental allergies. Negative for food allergies.   Neurological: Negative for dizziness, syncope, weakness and light-headedness.   Hematological: Negative for adenopathy. Bruises/bleeds easily.   Psychiatric/Behavioral: Negative for agitation and behavioral problems.   .    /80 (BP Location: Right arm, Patient Position: Sitting)   Pulse 59   Temp 97.2 °F (36.2 °C)   Ht 177.8 cm (70\")   Wt 99.5 kg (219 lb 6.4 oz)   SpO2 94% Comment: resting at room air  BMI 31.48 kg/m²     Immunization History   Administered Date(s) Administered   • Hib (PRP-T) 01/15/2019   • Meningococcal Conjugate 01/15/2019   • Pneumococcal Conjugate 13-Valent (PCV13) 01/15/2019       Physical Exam   Constitutional: He is oriented to person, place, and time. He appears well-developed and well-nourished.   HENT:   Head: Normocephalic and atraumatic.   Eyes: Pupils are equal, round, and reactive to light.   Neck: Normal range of motion. Neck supple. No thyromegaly present.   Cardiovascular: Normal rate, regular rhythm, normal heart sounds and intact distal pulses. Exam reveals no gallop and no friction rub.   No murmur " heard.  Pulmonary/Chest: Effort normal and breath sounds normal. No respiratory distress. He has no wheezes. He has no rales. He exhibits no tenderness.   Abdominal: Soft. Bowel sounds are normal. There is no tenderness.   Musculoskeletal: Normal range of motion.   Lymphadenopathy:     He has no cervical adenopathy.   Neurological: He is alert and oriented to person, place, and time.   Skin: Skin is warm and dry. Capillary refill takes less than 2 seconds. He is not diaphoretic.   Psychiatric: He has a normal mood and affect. His behavior is normal.   Nursing note and vitals reviewed.        RESULTS      PROBLEM LIST    Problem List Items Addressed This Visit        Cardiovascular and Mediastinum    Atrial fibrillation (CMS/[facility]) - Primary    Overview     a. CHADS2 = 0.  b. Event recorder, December 2007: Sinus bradycardia with heart rate in the 30 to 40 BPM range.    c. Holter monitor, 01/10/2013: Atrial fibrillation with ventricular response of  BPM and rare PACs.            Respiratory    ANN MARIE on CPAP    Overview     CPAP compliant         COPD (chronic obstructive pulmonary disease) (CMS/[facility])    Overview     Mild to moderate obstruction on Oct 2016 PFTs         Relevant Medications    Fluticasone Furoate-Vilanterol (BREO ELLIPTA) 200-25 MCG/INH inhaler            DISCUSSION    Mr. Craven was here for follow-up of his COPD.  He will remain on Breo 200 daily for his mild obstruction.  I did call this in for him today and provided him with a sample.  He will continue to use his rescue inhaler as needed for shortness of breath.  He looks much better when compared to the last time he was in the office.  He seems to have fully recovered from his splenectomy.    I did encourage him to wear his CPAP every night for obstructive sleep apnea.  If he noticed worsening shortness of breath he should start wearing this again.  He states that he is going to try to use it at night.    He will continue to follow-up with   Karthikeyan for his atrial fibrillation and congestive heart failure.    He will follow-up in 6 months or sooner if his symptoms worsen.  He will call with any additional concerns or questions.  I spent 25 minutes with the patient and family. I spent > 50% percent of this time counseling and discussing diagnosis, prognosis, diagnostic testing, evaluation, current status, treatment options and management.    Myrnamaykel Thorne, APRN  07/30/20191:12 PM  Electronically signed     Please note that portions of this note were completed with a voice recognition program. Efforts were made to edit the dictations, but occasionally words are mistranscribed.      CC: Jeane Baird MD

## 2019-07-30 NOTE — PROGRESS NOTES
"  OFFICE FOLLOW UP     Date of Encounter:2019     Name: Smith Craven  : 1950  Address: [address] [place] [address]    PCP: Jeane Baird MD  [address] [place] [address]    Smith Craven is a 68 y.o. male.      Chief Complaint: Follow up post splenectomy, PAF, \"heart failure\"    Problem List:   1. Cardiac disease, multifactorial:              A. Non-obstructive CAD by cath, 2008.              B. Atrial fibrillation. CV = 2, on Eliquis  C. TTE, 12/15/18: biatrial enlargement, normal LVEF, mild MR, trivial pericardial effusion, mild PA hypertension  D. RHC 19: Severe biventricular elevation in diastolic pressures and severe PAH  E. LAUREN 19: EF 55%, mild-mod MR, LA and RA are dilated, saline test results positive with valsalva for PFO  2.  Obesity.  3.  Neurocardiogenic syncope.  4.  Cholelithiasis status post cholecystectomy, summer 2016:                          A. Procedure complicated by pneumonitis and need for blood transfusions.   5.  COPD and ANN MARIE, CPAP compliant:                          A. Followed by Yossi Blake MD.  6.  Chronic lower extremity venous insufficiency.  7.  Chronic leukopenia and anemia - resolved.   A. Hypersplenism   B. 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  Gilbert's syndrome (doubt, see # 7B above).   9.  \"Fatty liver with no cirrhosis or amyloid\" diagnosed by liver biopsy, Summer 2018    10.  Hospitalization, 2018: shortness of breath                          A.  Bilateral pleural effusions/pericardial effusion noted.                          B.  EAP=510, negative troponins                          C.  \"MASSIVE\" spleenomegaly with no important ascites                          D.   Recent O2 for desaturation.  11.  Splenectomy, laparoscopic: hereditary elliptocytosis, 2019    Allergies:  No Known Allergies    Current Medications:  •  apixaban (ELIQUIS) 5 MG tablet tablet, Take 5 mg by mouth 2 (two) times a " "day.  •  cholecalciferol (VITAMIN D3) 1000 UNITS tablet, Take 5,000 Units by mouth every morning.  •  Fluticasone Furoate-Vilanterol (BREO ELLIPTA) 200-25 MCG/INH inhaler, Inhale 1 puff Daily.  •  folic acid (FOLVITE) 1 MG tablet, Take 1 mg by mouth every morning  •  furosemide (LASIX) 40 MG tablet, Take 1 tablet by mouth 2 (Two) Times a Day.   •  metoprolol succinate XL (TOPROL-XL) 25 MG 24 hr tablet, Take 1 tablet by mouth Every Morning.  •  potassium chloride (K-DUR) 10 MEQ CR tablet, Take 10 mEq by mouth Daily.  •  vitamin B-12 (CYANOCOBALAMIN) 1000 MCG tablet, Take 2,500 mcg by mouth daily.  •  eplerenone (INSPRA) 25 MG tablet, Take 1 tablet by mouth Daily.    History of Present Illness:       Mr. Craven returns today for follow up. He has been to [facility] and underwent splenectomy in April.  His [facility] surgeon told him that it his spleen was the biggest that he had ever removed.  He is doing well. He is off home oxygen and \"extra\" Lasix. He has returned to usual activities and has had no shortness of breath, edema, or chest discomfort.      The following portions of the patient's history were reviewed and updated as appropriate: allergies, current medications and problem list.    ROS: Pertinent positives as listed in the HPI.  All other systems reviewed and negative.    Objective:    Vitals:    07/30/19 1145 07/30/19 1146   BP: 131/74 130/76   BP Location: Right arm Right arm   Patient Position: Sitting Standing   Pulse: 52 56   Weight: 99.3 kg (219 lb)    Height: 177.8 cm (70\")        Physical Exam:  GENERAL: Alert, cooperative, in no acute distress.   HEENT: Normocephalic, no adenopathy, no jugular venous distention  HEART: No discrete PMI is noted. Regular rhythm, normal rate, and no murmurs, gallops, or rubs.   LUNGS: Clear to auscultation bilaterally. No wheezing, rales or ronchi.  ABDOMEN: Soft, bowel sounds present, non-tender. Entry sites for splenectomy are noted.   NEUROLOGIC: No " "focal abnormalities involving strength or sensation are noted.   EXTREMITIES: No clubbing, cyanosis noted. +1 bilateral pretibial edema note.       Procedures      Assessment and Plan:  1. PAF- continue Apixaban and Metoprolol  2. Chronic Lower Extremity Edema- Continue Lasix     I interviewed and examined the patient again this afternoon.  I spoke in detail with the patient and his wife.  He has had a remarkable overall improvement since splenectomy.  The final diagnosis at the [facility] is one of hereditary elliptocytosis with massive splenomegaly and hypersplenism.  I doubt that he ever had significant liver disease and suspect that the majority of his problems were related to his splenic size and \"hypersplenism\".  I will not make changes from [facility] recommendations at this time.  I will see the patient back in 6 months for a \"relook\" echocardiogram.  He will continue Apixaban for PAF until such a time that we can document its resolution (and we cannot be sure that it will resolve).       I will see Smith Craven back in 6 months with same day echocardiogram or sooner on an as needed basis.      Scribed for Brant Napier MD by Jennifer Hammonds RN. 07/30/2019 12:13 PM.                   "

## 2020-01-25 DIAGNOSIS — I48.0 PAROXYSMAL ATRIAL FIBRILLATION (HCC): ICD-10-CM

## 2020-01-25 DIAGNOSIS — I27.20 PULMONARY HYPERTENSION (HCC): ICD-10-CM

## 2020-01-27 RX ORDER — METOPROLOL SUCCINATE 25 MG/1
TABLET, EXTENDED RELEASE ORAL
Qty: 30 TABLET | Refills: 11 | Status: SHIPPED | OUTPATIENT
Start: 2020-01-27 | End: 2020-04-09 | Stop reason: SDUPTHER

## 2020-02-18 ENCOUNTER — OFFICE VISIT (OUTPATIENT)
Dept: CARDIOLOGY | Facility: CLINIC | Age: 70
End: 2020-02-18

## 2020-02-18 ENCOUNTER — HOSPITAL ENCOUNTER (OUTPATIENT)
Dept: CARDIOLOGY | Facility: HOSPITAL | Age: 70
Discharge: HOME | End: 2020-02-18
Admitting: INTERNAL MEDICINE

## 2020-02-18 VITALS
BODY MASS INDEX: 35.07 KG/M2 | HEART RATE: 61 BPM | HEIGHT: 70 IN | DIASTOLIC BLOOD PRESSURE: 91 MMHG | SYSTOLIC BLOOD PRESSURE: 149 MMHG | WEIGHT: 245 LBS

## 2020-02-18 VITALS — HEIGHT: 70 IN | BODY MASS INDEX: 31.35 KG/M2 | WEIGHT: 219 LBS

## 2020-02-18 DIAGNOSIS — I48.0 PAROXYSMAL ATRIAL FIBRILLATION (HCC): ICD-10-CM

## 2020-02-18 DIAGNOSIS — I27.20 PULMONARY HYPERTENSION (HCC): ICD-10-CM

## 2020-02-18 LAB
BH CV ECHO MEAS - AO MAX PG (FULL): 0.26 MMHG
BH CV ECHO MEAS - AO MAX PG: 5.9 MMHG
BH CV ECHO MEAS - AO ROOT AREA (BSA CORRECTED): 1.7
BH CV ECHO MEAS - AO ROOT AREA: 11 CM^2
BH CV ECHO MEAS - AO ROOT DIAM: 3.7 CM
BH CV ECHO MEAS - AO V2 MAX: 121.6 CM/SEC
BH CV ECHO MEAS - AVA(V,A): 3.2 CM^2
BH CV ECHO MEAS - AVA(V,D): 3.2 CM^2
BH CV ECHO MEAS - BSA(HAYCOCK): 2.2 M^2
BH CV ECHO MEAS - BSA: 2.2 M^2
BH CV ECHO MEAS - BZI_BMI: 31.4 KILOGRAMS/M^2
BH CV ECHO MEAS - BZI_METRIC_HEIGHT: 177.8 CM
BH CV ECHO MEAS - BZI_METRIC_WEIGHT: 99.3 KG
BH CV ECHO MEAS - EDV(CUBED): 206 ML
BH CV ECHO MEAS - EDV(MOD-SP2): 103 ML
BH CV ECHO MEAS - EDV(MOD-SP4): 133 ML
BH CV ECHO MEAS - EDV(TEICH): 173.6 ML
BH CV ECHO MEAS - EF(CUBED): 50.7 %
BH CV ECHO MEAS - EF(MOD-BP): 52 %
BH CV ECHO MEAS - EF(MOD-SP2): 58.3 %
BH CV ECHO MEAS - EF(MOD-SP4): 47.4 %
BH CV ECHO MEAS - EF(TEICH): 42.1 %
BH CV ECHO MEAS - ESV(CUBED): 101.5 ML
BH CV ECHO MEAS - ESV(MOD-SP2): 43 ML
BH CV ECHO MEAS - ESV(MOD-SP4): 70 ML
BH CV ECHO MEAS - ESV(TEICH): 100.6 ML
BH CV ECHO MEAS - FS: 21 %
BH CV ECHO MEAS - IVS/LVPW: 0.99
BH CV ECHO MEAS - IVSD: 0.93 CM
BH CV ECHO MEAS - LA DIMENSION: 5.2 CM
BH CV ECHO MEAS - LA/AO: 1.4
BH CV ECHO MEAS - LAD MAJOR: 8.5 CM
BH CV ECHO MEAS - LAT PEAK E' VEL: 11 CM/SEC
BH CV ECHO MEAS - LATERAL E/E' RATIO: 10.8
BH CV ECHO MEAS - LV DIASTOLIC VOL/BSA (35-75): 61.3 ML/M^2
BH CV ECHO MEAS - LV MASS(C)D: 221.4 GRAMS
BH CV ECHO MEAS - LV MASS(C)DI: 102.1 GRAMS/M^2
BH CV ECHO MEAS - LV MAX PG: 5.7 MMHG
BH CV ECHO MEAS - LV MEAN PG: 2.8 MMHG
BH CV ECHO MEAS - LV SYSTOLIC VOL/BSA (12-30): 32.3 ML/M^2
BH CV ECHO MEAS - LV V1 MAX: 119 CM/SEC
BH CV ECHO MEAS - LV V1 MEAN: 77.6 CM/SEC
BH CV ECHO MEAS - LV V1 VTI: 26.5 CM
BH CV ECHO MEAS - LVIDD: 5.9 CM
BH CV ECHO MEAS - LVIDS: 4.7 CM
BH CV ECHO MEAS - LVLD AP2: 9.2 CM
BH CV ECHO MEAS - LVLD AP4: 8.7 CM
BH CV ECHO MEAS - LVLS AP2: 8 CM
BH CV ECHO MEAS - LVLS AP4: 7.3 CM
BH CV ECHO MEAS - LVOT AREA (M): 3.1 CM^2
BH CV ECHO MEAS - LVOT AREA: 3.3 CM^2
BH CV ECHO MEAS - LVOT DIAM: 2 CM
BH CV ECHO MEAS - LVPWD: 0.94 CM
BH CV ECHO MEAS - MED PEAK E' VEL: 6.7 CM/SEC
BH CV ECHO MEAS - MEDIAL E/E' RATIO: 17.6
BH CV ECHO MEAS - MR ALIAS VEL: 31.9 CM/SEC
BH CV ECHO MEAS - MR ERO: 0.22 CM^2
BH CV ECHO MEAS - MR FLOW RATE: 106.3 CM^3/SEC
BH CV ECHO MEAS - MR MAX PG: 98 MMHG
BH CV ECHO MEAS - MR MAX VEL: 493.4 CM/SEC
BH CV ECHO MEAS - MR MEAN PG: 60.6 MMHG
BH CV ECHO MEAS - MR MEAN VEL: 360.3 CM/SEC
BH CV ECHO MEAS - MR PISA RADIUS: 0.73 CM
BH CV ECHO MEAS - MR PISA: 3.3 CM^2
BH CV ECHO MEAS - MR VOLUME: 34.4 ML
BH CV ECHO MEAS - MR VTI: 159.9 CM
BH CV ECHO MEAS - MV A MAX VEL: 49.9 CM/SEC
BH CV ECHO MEAS - MV AREA (1 DIAM): 9.5 CM^2
BH CV ECHO MEAS - MV DEC TIME: 0.18 SEC
BH CV ECHO MEAS - MV DIAM: 3.5 CM
BH CV ECHO MEAS - MV E MAX VEL: 120.4 CM/SEC
BH CV ECHO MEAS - MV E/A: 2.4
BH CV ECHO MEAS - MV FLOW AREA(1DIAM): 9.5 CM^2
BH CV ECHO MEAS - MV MAX PG: 6.4 MMHG
BH CV ECHO MEAS - MV MEAN PG: 2.6 MMHG
BH CV ECHO MEAS - MV V2 MAX: 126.8 CM/SEC
BH CV ECHO MEAS - MV V2 MEAN: 75.7 CM/SEC
BH CV ECHO MEAS - MV V2 VTI: 37.1 CM
BH CV ECHO MEAS - MVA(VTI): 2.3 CM^2
BH CV ECHO MEAS - PA ACC SLOPE: 575.8 CM/SEC^2
BH CV ECHO MEAS - PA ACC TIME: 0.14 SEC
BH CV ECHO MEAS - PA MAX PG: 3.4 MMHG
BH CV ECHO MEAS - PA PR(ACCEL): 18 MMHG
BH CV ECHO MEAS - PA V2 MAX: 91.9 CM/SEC
BH CV ECHO MEAS - RAP SYSTOLE: 3 MMHG
BH CV ECHO MEAS - RF(MV,LVOT)(1DIAM): 0.75
BH CV ECHO MEAS - RVSP: 31 MMHG
BH CV ECHO MEAS - SI(CUBED): 48.2 ML/M^2
BH CV ECHO MEAS - SI(LVOT): 39.7 ML/M^2
BH CV ECHO MEAS - SI(MOD-SP2): 27.7 ML/M^2
BH CV ECHO MEAS - SI(MOD-SP4): 29 ML/M^2
BH CV ECHO MEAS - SI(MV 1 DIAM): 162 ML/M^2
BH CV ECHO MEAS - SI(TEICH): 33.7 ML/M^2
BH CV ECHO MEAS - SV(CUBED): 104.5 ML
BH CV ECHO MEAS - SV(LVOT): 86.2 ML
BH CV ECHO MEAS - SV(MOD-SP2): 60 ML
BH CV ECHO MEAS - SV(MOD-SP4): 63 ML
BH CV ECHO MEAS - SV(MV 1 DIAM): 351.4 ML
BH CV ECHO MEAS - SV(TEICH): 73.1 ML
BH CV ECHO MEAS - TAPSE (>1.6): 2 CM2
BH CV ECHO MEAS - TR MAX PG: 28 MMHG
BH CV ECHO MEAS - TR MAX VEL: 264.3 CM/SEC
BH CV ECHO MEASUREMENTS AVERAGE E/E' RATIO: 13.6
BH CV VAS BP LEFT ARM: NORMAL MMHG
BH CV XLRA - RV BASE: 4.4 CM
BH CV XLRA - RV LENGTH: 8.5 CM
BH CV XLRA - RV MID: 3 CM
BH CV XLRA - TDI S': 11 CM/SEC
LEFT ATRIUM VOLUME INDEX: 72.8 ML/M^2
LEFT ATRIUM VOLUME: 158 ML
MAXIMAL PREDICTED HEART RATE: 149 BPM
MR PISA EROA: 0.2 CM2
MV REGURGITANT FRACTION: 11 %
MV VENA CONTRACTA: 1 CM
PISA ALIASING VEL: 3.2 M/S
PISA RADIUS: 0.7 CM
STRESS TARGET HR: 127 BPM

## 2020-02-18 PROCEDURE — 99213 OFFICE O/P EST LOW 20 MIN: CPT | Performed by: INTERNAL MEDICINE

## 2020-02-18 PROCEDURE — 93306 TTE W/DOPPLER COMPLETE: CPT

## 2020-02-18 PROCEDURE — 93320 DOPPLER ECHO COMPLETE: CPT | Performed by: INTERNAL MEDICINE

## 2020-02-18 PROCEDURE — 93325 DOPPLER ECHO COLOR FLOW MAPG: CPT | Performed by: INTERNAL MEDICINE

## 2020-02-18 PROCEDURE — 93303 ECHO TRANSTHORACIC: CPT | Performed by: INTERNAL MEDICINE

## 2020-02-18 RX ORDER — FUROSEMIDE 40 MG/1
40 TABLET ORAL 2 TIMES DAILY
COMMUNITY
End: 2020-03-02

## 2020-02-18 NOTE — PROGRESS NOTES
Portions of office note Scribed for Brant Napier MD by Jennifer Hammonds RN. 02/18/2020 12:40 PM.

## 2020-02-18 NOTE — PROGRESS NOTES
"  OFFICE FOLLOW UP     Date of Encounter:2020     Name: Smith Craven  : 1950  Address: [address] [place] [address]    PCP: Jeane Baird MD  [address] [place] [address]    Smith Craven is a 69 y.o. male.      Chief Complaint: Follow up of VHD, PAF    Problem List:   1. Cardiac disease, multifactorial:              A. Non-obstructive CAD by cath, 2008.              B. Atrial fibrillation. CV = 2, on Eliquis  C. TTE, 12/15/18: biatrial enlargement, normal LVEF, mild MR, trivial pericardial effusion, mild PA hypertension  D. RHC 19: Severe biventricular elevation in diastolic pressures and severe PAH  E. LAUREN 19: EF 55%, mild-mod MR, LA and RA are dilated, saline test results positive with valsalva for PFO  2.  Obesity.  3.  Neurocardiogenic syncope.  4.  Cholelithiasis status post cholecystectomy, summer 2016:                          A. Procedure complicated by pneumonitis and need for blood transfusions.   5.  COPD and ANN MARIE, CPAP compliant:                          A. Followed by Yossi Blake MD.  6.  Chronic lower extremity venous insufficiency.  7.  Chronic leukopenia and anemia - resolved.              A. Hypersplenism              B. 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  Gilbert's syndrome (doubt, see # 7B above).   9.  \"Fatty liver with no cirrhosis or amyloid\" diagnosed by liver biopsy, Summer 2018    10.  Hospitalization, 2018: shortness of breath                          A.  Bilateral pleural effusions/pericardial effusion noted.                          B.  NLQ=634, negative troponins                          C.  \"MASSIVE\" spleenomegaly with no important ascites                          D.   Recent O2 for desaturation.  11.  Splenectomy, laparoscopic: hereditary elliptocytosis, 2019    Allergies:  No Known Allergies    Current Medications:  •  apixaban (ELIQUIS) 5 MG tablet tablet, Take 1 tablet by mouth Every 12 (Twelve) " "Hours  •  cholecalciferol (VITAMIN D3) 1000 UNITS tablet, Take 5,000 Units by mouth every morning.  •  eplerenone (INSPRA) 25 MG tablet, Take 1 tablet by mouth Daily  •  Fluticasone Furoate-Vilanterol (BREO ELLIPTA) 200-25 MCG/INH inhaler, Inhale 1 puff Daily.  •  folic acid (FOLVITE) 1 MG tablet, Take 1 mg by mouth every morning.  •  furosemide (LASIX) 40 MG tablet, Take 40 mg by mouth 2 (Two) Times a Day.  •  metoprolol succinate XL (TOPROL-XL) 25 MG 24 hr tablet, TAKE ONE TABLET BY MOUTH EVERY MORNING  •  vitamin B-12 (CYANOCOBALAMIN) 1000 MCG tablet, Take 2,500 mcg by mouth daily    History of Present Illness:       Smith Craven returns for follow up today. His home blood pressures are \"normal\". He is feeling well and working everyday. He has not had follow up with specialist for his elliptocytosis. He is compliant with medications listed above. He has not had recurrent tachy palpitations suggesting atrial fibrillation. Echocardiogram was repeated today. He continues to follow up with pulmonologist. Home BPs are reported as SBPs<130 and lower on almost all occasions.          The following portions of the patient's history were reviewed and updated as appropriate: allergies, current medications and problem list.    ROS: Pertinent positives as listed in the HPI.  All other systems reviewed and negative.    Objective:    Vitals:    02/18/20 1149 02/18/20 1151   BP: (!) 159/104 149/91   BP Location: Right arm Right arm   Patient Position: Sitting Standing   Pulse: 54 61   Weight: 111 kg (245 lb)    Height: 177.8 cm (70\")        Physical Exam:  GENERAL: Alert, cooperative, in no acute distress. Overweight.  HEENT: Normocephalic, no adenopathy, no jugular venous distention  HEART: No discrete PMI is noted. Regular rhythm, normal rate, and no murmurs, gallops, or rubs.   LUNGS: Clear to auscultation bilaterally. No wheezing, rales or ronchi.  ABDOMEN: Soft, bowel sounds present, non-tender   NEUROLOGIC: No focal " abnormalities involving strength or sensation are noted.   EXTREMITIES: No clubbing, cyanosis, or noted. +1 pretibial dependent edema.      Diagnostic Data:  No new labs available to review.     Procedures    Assessment and Plan:   1.  PAF: The burden is uncertain at this time.  He is asymptomatic.  We will continue apixaban without change.  2.  Elevated blood pressures today: Continue to monitor home blood pressure readings and let us know should systolic pressures consistently rise above 130.  3.  Severe PA hypertension: RESOLVED on today's echocardiogram.   4.  Neurocardiogenic syncope: Inactive at present. No change in conservative management recommendations.  5.  Elliptocytosis: Per primary care and hematology.    I will see Smith Craven back in 6 months or sooner on an as needed basis.              EMR Dragon/Transcription Disclaimer:  Much of this encounter note is an electronic transcription/translation of spoken language to printed text.  The electronic translation of spoken language may permit erroneous, or at times, nonsensical words or phrases to be inadvertently transcribed.  Although I have reviewed the note for such errors, some may still exist.

## 2020-03-02 RX ORDER — FUROSEMIDE 40 MG/1
TABLET ORAL
Qty: 60 TABLET | Refills: 11 | Status: SHIPPED | OUTPATIENT
Start: 2020-03-02 | End: 2020-04-09 | Stop reason: SDUPTHER

## 2020-04-09 DIAGNOSIS — I48.0 PAROXYSMAL ATRIAL FIBRILLATION (HCC): ICD-10-CM

## 2020-04-09 DIAGNOSIS — I27.20 PULMONARY HYPERTENSION (HCC): ICD-10-CM

## 2020-04-09 RX ORDER — FUROSEMIDE 40 MG/1
40 TABLET ORAL 2 TIMES DAILY
Qty: 180 TABLET | Refills: 3 | Status: SHIPPED | OUTPATIENT
Start: 2020-04-09 | End: 2021-01-08 | Stop reason: SDUPTHER

## 2020-04-09 RX ORDER — METOPROLOL SUCCINATE 25 MG/1
25 TABLET, EXTENDED RELEASE ORAL EVERY MORNING
Qty: 90 TABLET | Refills: 3 | Status: SHIPPED | OUTPATIENT
Start: 2020-04-09 | End: 2021-01-08 | Stop reason: SDUPTHER

## 2020-04-17 DIAGNOSIS — I27.20 PULMONARY HYPERTENSION (HCC): ICD-10-CM

## 2020-04-17 DIAGNOSIS — I48.0 PAROXYSMAL ATRIAL FIBRILLATION (HCC): ICD-10-CM

## 2020-06-24 DIAGNOSIS — I48.0 PAROXYSMAL ATRIAL FIBRILLATION (HCC): ICD-10-CM

## 2020-06-24 DIAGNOSIS — I27.20 PULMONARY HYPERTENSION (HCC): ICD-10-CM

## 2020-06-24 RX ORDER — EPLERENONE 25 MG/1
25 TABLET, FILM COATED ORAL
Qty: 90 TABLET | Refills: 3 | Status: SHIPPED | OUTPATIENT
Start: 2020-06-24 | End: 2020-07-02 | Stop reason: SDUPTHER

## 2020-07-02 DIAGNOSIS — I27.20 PULMONARY HYPERTENSION (HCC): ICD-10-CM

## 2020-07-02 DIAGNOSIS — I48.0 PAROXYSMAL ATRIAL FIBRILLATION (HCC): ICD-10-CM

## 2020-07-02 RX ORDER — EPLERENONE 25 MG/1
25 TABLET, FILM COATED ORAL
Qty: 90 TABLET | Refills: 3 | Status: SHIPPED | OUTPATIENT
Start: 2020-07-02 | End: 2021-01-07

## 2020-07-29 DIAGNOSIS — J44.9 CHRONIC OBSTRUCTIVE PULMONARY DISEASE, UNSPECIFIED COPD TYPE (HCC): ICD-10-CM

## 2020-08-18 ENCOUNTER — OFFICE VISIT (OUTPATIENT)
Dept: CARDIOLOGY | Facility: CLINIC | Age: 70
End: 2020-08-18

## 2020-08-18 VITALS
TEMPERATURE: 98.4 F | WEIGHT: 256.6 LBS | OXYGEN SATURATION: 97 % | HEART RATE: 57 BPM | DIASTOLIC BLOOD PRESSURE: 81 MMHG | SYSTOLIC BLOOD PRESSURE: 137 MMHG | HEIGHT: 70 IN | BODY MASS INDEX: 36.73 KG/M2

## 2020-08-18 DIAGNOSIS — I25.10 CORONARY ARTERY DISEASE INVOLVING NATIVE CORONARY ARTERY OF NATIVE HEART, ANGINA PRESENCE UNSPECIFIED: ICD-10-CM

## 2020-08-18 DIAGNOSIS — I34.0 MITRAL VALVE INSUFFICIENCY, UNSPECIFIED ETIOLOGY: Primary | ICD-10-CM

## 2020-08-18 PROCEDURE — 99214 OFFICE O/P EST MOD 30 MIN: CPT | Performed by: INTERNAL MEDICINE

## 2020-08-18 RX ORDER — ATORVASTATIN CALCIUM 20 MG/1
20 TABLET, FILM COATED ORAL DAILY
Qty: 30 TABLET | Refills: 11 | Status: SHIPPED | OUTPATIENT
Start: 2020-08-18 | End: 2021-08-24 | Stop reason: SDUPTHER

## 2020-08-18 NOTE — PROGRESS NOTES
"  OFFICE FOLLOW UP     Date of Encounter:2020     Name: Smith Craevn  : 1950  Address: [address] [place] [address]    PCP: Jeane Baird MD  [address] [place] [address]    Smith Craven is a 69 y.o. male.      Chief Complaint: Follow up of CAD, PAF    Problem List:   1.  Cardiac disease, multifactorial:              A. Non-obstructive CAD by cath, 2008.              B. Atrial fibrillation. CV = 2, on Eliquis  C. TTE, 12/15/18: biatrial enlargement, normal LVEF, mild MR, trivial pericardial effusion, mild PA hypertension  D. RHC 19: Severe biventricular elevation in diastolic pressures and severe PAH (pre-splenectomy)  E. LAUREN 19: EF 55%, mild-mod MR, LA and RA are dilated, saline test results positive with valsalva for PFO   F. Echo, 2020: biatrial enlargement, EF 52%,   2.    Obesity.  3.    Neurocardiogenic syncope.  4.    Cholelithiasis status post cholecystectomy, summer 2016:                          A. Procedure complicated by pneumonitis and need for blood transfusions.   5.    COPD and ANN MARIE, CPAP compliant:                          A. Followed by Yossi Blake MD.  6.    Chronic lower extremity venous insufficiency.  7.    Chronic leukopenia and anemia - resolved.              A. Hypersplenism              B. 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    Gilbert's syndrome (doubt, see # 7B above).   9.    \"Fatty liver with no cirrhosis or amyloid\" diagnosed by liver biopsy, Summer 2018    10.  Hospitalization, 2018: shortness of breath                          A.  Bilateral pleural effusions/pericardial effusion noted.                          B.  UNG=239, negative troponins                          C.  \"MASSIVE\" spleenomegaly with no important ascites                          D.   Recent O2 for desaturation.             11.   Splenectomy, laparoscopic: hereditary elliptocytosis, 2019    Allergies:  No Known " "Allergies    Current Medications:  •  apixaban (ELIQUIS) 5 MG tablet tablet, Take 1 tablet by mouth Every 12 (Twelve) Hours.  •  BREO ELLIPTA 200-25 MCG/INH inhaler, INHALE 1 PUFF BY MOUTH EVERY DAY FOR BREATHING  •  cholecalciferol (VITAMIN D3) 1000 UNITS tablet, Take 5,000 Units by mouth every morning.  •  eplerenone (INSPRA) 25 MG tablet, Take 1 tablet by mouth Daily.  •  folic acid (FOLVITE) 1 MG tablet, Take 1 mg by mouth every morning.  •  furosemide (LASIX) 40 MG tablet, Take 1 tablet by mouth 2 (Two) Times a Day  •  metoprolol succinate XL (TOPROL-XL) 25 MG 24 hr tablet, Take 1 tablet by mouth Every Morning  •  vitamin B-12 (CYANOCOBALAMIN) 1000 MCG tablet, Take 2,500 mcg by mouth daily    History of Present Illness:   Smith Craven returns for scheduled follow up today. He has gained weight since his last visit and has not been using his CPAP mask. He denies symptoms of angina or heart failure. Home blood pressures are normal.     The following portions of the patient's history were reviewed and updated as appropriate: allergies, current medications and problem list.    ROS: Pertinent positives as listed in the HPI.  All other systems reviewed and negative.    Objective:    Vitals:    08/18/20 1241 08/18/20 1244   BP: 143/80 137/81   BP Location: Right arm Right arm   Patient Position: Sitting Standing   Pulse: 60 57   Temp: 98.4 °F (36.9 °C)    SpO2: 97%    Weight: 116 kg (256 lb 9.6 oz)    Height: 177.8 cm (70\")      Wt Readings from Last 3 Encounters:   08/18/20 116 kg (256 lb 9.6 oz)   02/18/20 99.3 kg (219 lb)   02/18/20 111 kg (245 lb)     Physical Exam:  GENERAL: Alert, cooperative, in no acute distress.   HEENT: Normocephalic, no adenopathy, no jugular venous distention  HEART: No discrete PMI is noted. Regular rhythm, normal rate, and no murmurs, gallops, or rubs.   LUNGS: Clear to auscultation bilaterally. No wheezing, rales or ronchi.  ABDOMEN: Obese, soft, bowel sounds present, non-tender "   NEUROLOGIC: No focal abnormalities involving strength or sensation are noted.   EXTREMITIES: No clubbing, cyanosis, or edema noted.      Diagnostic Data:    8/2020  LIPID: , TRIG 127, HDL 38,   BUN 15, CR 1.18  TSH 2.230    Procedures      Assessment and Plan:   1.  CAD: He has not had angina. Historically, he has had non-obstructive disease.  2.  PAF:  Continue metoprolol and Eliquis. NYHA I.  3.  Dyslipidemia: We will begin atorvastatin 20 mg daily at bedtime. Target LDL to less than 70. He will have follow up labs with PCP in 6-8 weeks.   4.  Weight gain: He has gained 30# over the last few months. We discussed this frankly.    I will see Smith ALEXANDRIA Craven back in one year or sooner on an as needed basis.  Scribed for Brant Napier MD by Jennifer Hammonds RN. 08/18/2020 1:16 PM.     EMR Dragon/Transcription Disclaimer:  Much of this encounter note is an electronic transcription/translation of spoken language to printed text.  The electronic translation of spoken language may permit erroneous, or at times, nonsensical words or phrases to be inadvertently transcribed.  Although I have reviewed the note for such errors, some may still exist.

## 2020-11-19 DIAGNOSIS — J44.9 CHRONIC OBSTRUCTIVE PULMONARY DISEASE, UNSPECIFIED COPD TYPE (HCC): ICD-10-CM

## 2021-01-07 DIAGNOSIS — I27.20 PULMONARY HYPERTENSION (HCC): ICD-10-CM

## 2021-01-07 DIAGNOSIS — I48.0 PAROXYSMAL ATRIAL FIBRILLATION (HCC): ICD-10-CM

## 2021-01-07 NOTE — TELEPHONE ENCOUNTER
Next appt 8/2021,  8/2020 Labs  LIPID: , TRIG 127, HDL 38,   BUN 15, CR 1.18  TSH 2.230    Prior auth needed on Eliquis and Eplerenone - Jennifer to do with CoverMyMeds

## 2021-01-08 DIAGNOSIS — I27.20 PULMONARY HYPERTENSION (HCC): ICD-10-CM

## 2021-01-08 DIAGNOSIS — I48.0 PAROXYSMAL ATRIAL FIBRILLATION (HCC): ICD-10-CM

## 2021-01-08 RX ORDER — EPLERENONE 25 MG/1
TABLET, FILM COATED ORAL
Qty: 30 TABLET | Refills: 8 | Status: SHIPPED | OUTPATIENT
Start: 2021-01-08 | End: 2021-12-08 | Stop reason: SDUPTHER

## 2021-01-08 RX ORDER — METOPROLOL SUCCINATE 25 MG/1
25 TABLET, EXTENDED RELEASE ORAL EVERY MORNING
Qty: 90 TABLET | Refills: 3 | Status: SHIPPED | OUTPATIENT
Start: 2021-01-08 | End: 2021-05-27 | Stop reason: SINTOL

## 2021-01-08 RX ORDER — FUROSEMIDE 40 MG/1
40 TABLET ORAL 2 TIMES DAILY
Qty: 180 TABLET | Refills: 2 | Status: SHIPPED | OUTPATIENT
Start: 2021-01-08 | End: 2022-05-09 | Stop reason: SDUPTHER

## 2021-01-08 NOTE — TELEPHONE ENCOUNTER
8/2020 Labs  LIPID: , TRIG 127, HDL 38,   BUN 15, CR 1.18  TSH 2.230    Needs furosemide and metoprolol succinate  Next appt 8/2021

## 2021-04-07 ENCOUNTER — TELEPHONE (OUTPATIENT)
Dept: CARDIOLOGY | Facility: CLINIC | Age: 71
End: 2021-04-07

## 2021-04-07 NOTE — TELEPHONE ENCOUNTER
Wife called to report has new shortness of breath that has been worsening over the last 3 months. Pt did test positive for COVID - 1/22/2021 - 4 weeks to recover - received infusion - not hospitalized    She reports his lips are blue at times. He has nebulizer he uses BID. Other symptoms: cyanosis hands, tired, anxious - no home oxygen.    Pt has appt with Dr. Blake on Friday. Will await outcome of pulmonary evaluation to schedule with [facility]. Wife agreeable.

## 2021-04-09 ENCOUNTER — OFFICE VISIT (OUTPATIENT)
Dept: PULMONOLOGY | Facility: CLINIC | Age: 71
End: 2021-04-09

## 2021-04-09 VITALS
HEIGHT: 70 IN | SYSTOLIC BLOOD PRESSURE: 142 MMHG | DIASTOLIC BLOOD PRESSURE: 90 MMHG | HEART RATE: 70 BPM | TEMPERATURE: 98.6 F | BODY MASS INDEX: 37.65 KG/M2 | WEIGHT: 263 LBS | OXYGEN SATURATION: 94 %

## 2021-04-09 DIAGNOSIS — J44.9 CHRONIC OBSTRUCTIVE PULMONARY DISEASE, UNSPECIFIED COPD TYPE (HCC): Primary | ICD-10-CM

## 2021-04-09 DIAGNOSIS — G47.33 OSA (OBSTRUCTIVE SLEEP APNEA): ICD-10-CM

## 2021-04-09 DIAGNOSIS — R06.02 SOB (SHORTNESS OF BREATH): ICD-10-CM

## 2021-04-09 PROBLEM — I27.20 PULMONARY HYPERTENSION: Status: RESOLVED | Noted: 2019-01-14 | Resolved: 2021-04-09

## 2021-04-09 PROCEDURE — 99214 OFFICE O/P EST MOD 30 MIN: CPT | Performed by: INTERNAL MEDICINE

## 2021-04-09 PROCEDURE — 94618 PULMONARY STRESS TESTING: CPT | Performed by: INTERNAL MEDICINE

## 2021-04-09 RX ORDER — PREDNISONE 10 MG/1
10 TABLET ORAL DAILY
COMMUNITY
End: 2021-05-27

## 2021-04-09 NOTE — PROGRESS NOTES
Subjective:     Chief Complaint:   Chief Complaint   Patient presents with   • COPD   • Shortness of Breath   • Emphysema       HPI:    Smith Craven is a 70 y.o. male here for follow-up of COPD and obstructive sleep apnea    This is the first time he has been seen in the clinic in almost 2 years.  I last saw him in January 2019 when he was hospitalized and he was subsequently treated at [facility] and underwent a splenectomy.  Since then he has greatly improved.  He is doing well from a cardiac standpoint and his last echo a year ago revealed resolution of his pulmonary hypertension.  He follows with Dr. Napier and he feels that he is doing well from a cardiac standpoint.    For his COPD he remains on Breo and albuterol.  He uses oral steroids only as needed.  He uses albuterol nebulizers as needed.    Recently primarily based upon his wife's history he has noted some low oxygen levels at times.  This can be related to exertion.  He is not on any oxygen at home.    He does not note much in the way of increased cough or sputum production.  He is more dyspneic with exertion.    Current medications are:   Current Outpatient Medications:   •  apixaban (ELIQUIS) 5 MG tablet tablet, Take 1 tablet by mouth Every 12 (Twelve) Hours., Disp: 180 tablet, Rfl: 3  •  atorvastatin (LIPITOR) 20 MG tablet, Take 1 tablet by mouth Daily., Disp: 30 tablet, Rfl: 11  •  cholecalciferol (VITAMIN D3) 1000 UNITS tablet, Take 5,000 Units by mouth every morning., Disp: , Rfl:   •  eplerenone (INSPRA) 25 MG tablet, TAKE ONE TABLET BY MOUTH ONCE DAILY, Disp: 30 tablet, Rfl: 8  •  Fluticasone Furoate-Vilanterol (Breo Ellipta) 200-25 MCG/INH inhaler, Inhale 1 puff Daily., Disp: 60 each, Rfl: 3  •  folic acid (FOLVITE) 1 MG tablet, Take 1 mg by mouth every morning., Disp: , Rfl:   •  furosemide (LASIX) 40 MG tablet, Take 1 tablet by mouth 2 (Two) Times a Day., Disp: 180 tablet, Rfl: 2  •  metoprolol succinate XL (TOPROL-XL) 25 MG 24 hr  tablet, Take 1 tablet by mouth Every Morning., Disp: 90 tablet, Rfl: 3  •  predniSONE (DELTASONE) 10 MG tablet, Take 10 mg by mouth Daily., Disp: , Rfl:   •  vitamin B-12 (CYANOCOBALAMIN) 1000 MCG tablet, Take 2,500 mcg by mouth daily., Disp: , Rfl:     Current Facility-Administered Medications:   •  albuterol (PROVENTIL HFA;VENTOLIN HFA) inhaler 2 puff, 2 puff, Inhalation, Q4H PRN, Yossi Blake MD.      The patient's relevant past medical, surgical, family and social history were reviewed and updated in Epic as appropriate.     ROS:    Review of Systems  ROS as documented in patient questionnaire unless as noted otherwise    Objective:    Physical Exam  Vitals reviewed.   Constitutional:       Appearance: He is well-developed.   HENT:      Head: Normocephalic and atraumatic.      Mouth/Throat:      Mouth: Mucous membranes are moist.      Pharynx: Oropharynx is clear.   Neck:      Thyroid: No thyromegaly.   Cardiovascular:      Rate and Rhythm: Normal rate and regular rhythm.      Heart sounds: No murmur heard.   No friction rub. No gallop.    Pulmonary:      Effort: Pulmonary effort is normal. No respiratory distress.      Breath sounds: No wheezing or rales.      Comments: Decreased BS  Musculoskeletal:      Cervical back: Neck supple.      Right lower leg: Edema present.      Left lower leg: Edema present.   Skin:     General: Skin is warm and dry.   Neurological:      Mental Status: He is alert and oriented to person, place, and time.   Psychiatric:         Behavior: Behavior normal.         Thought Content: Thought content normal.         Data:    CXR: Heart at the upper limits of normal in size with prominent interstitial markings bilaterally but without much change over priors.    PFT: No additional    Exercise oximetry up to 900 feet with a alexand[place] of 92% on room air.    Assessment:    Problem List Items Addressed This Visit        Pulmonary Problems    SOB (shortness of breath)    ANN MARIE (obstructive  sleep apnea)    Overview     Off CPAP now         COPD (chronic obstructive pulmonary disease) (CMS/[facility]) - Primary    Overview     Mild to moderate obstruction on Oct 2016 PFTs         Relevant Medications    predniSONE (DELTASONE) 10 MG tablet    Other Relevant Orders    XR Chest PA & Lateral            1. COPD: Moderate obstruction by PFTs historically.  On Breo and albuterol.  I think he would potentially benefit from the addition of an anticholinergic.  I gave him samples of Spiriva and this could be ordered if it proves a benefit to him.  Continue Breo and albuterol.  2. Dyspnea on exertion: Exercise oximetry today reveals some drop in oxygen saturation but not to the level that would require addition of oxygen.  3. ANN MARIE: He tells me he stopped using his CPAP in 2019.  I think he should probably get back on that if possible.  Discussed with the patient and his wife.    Plan:    1. Addition of Spiriva as above.  I gave him a Respimat with instructions to use 2 puffs once a day and I instructed him in inhaler use  2. Continue Breo and albuterol  3. Urged him to resume CPAP  4. He would benefit from some weight loss and increased activity  5. Continued follow-up    Level of Risk Moderate due to: prescription drug management    Discussed in detail with the patient.  He will call prior to his follow up visit for any new problems.    Signed by  Yossi Blake MD

## 2021-05-25 ENCOUNTER — TELEPHONE (OUTPATIENT)
Dept: CARDIOLOGY | Facility: CLINIC | Age: 71
End: 2021-05-25

## 2021-05-25 NOTE — TELEPHONE ENCOUNTER
Wife called to report despite pulmonary FU with additional inhaler he is short of breath and has no energy. He is not currently using CPAP regularly and is using nebulizers frequently. He and his wife had COVID back in February and have not yet been vaccinated (waiting full 90 days). No cough, fever.     Will work in to see [facility] on Thursday @ 245. Wife aware she may come to waiting room only. Told to bring updated med list.

## 2021-05-27 ENCOUNTER — OFFICE VISIT (OUTPATIENT)
Dept: CARDIOLOGY | Facility: CLINIC | Age: 71
End: 2021-05-27

## 2021-05-27 VITALS
DIASTOLIC BLOOD PRESSURE: 94 MMHG | OXYGEN SATURATION: 91 % | BODY MASS INDEX: 37.94 KG/M2 | HEIGHT: 70 IN | WEIGHT: 265 LBS | HEART RATE: 69 BPM | SYSTOLIC BLOOD PRESSURE: 163 MMHG

## 2021-05-27 DIAGNOSIS — R00.1 BRADYCARDIA: ICD-10-CM

## 2021-05-27 DIAGNOSIS — R06.09 DOE (DYSPNEA ON EXERTION): Primary | ICD-10-CM

## 2021-05-27 DIAGNOSIS — I48.0 PAROXYSMAL ATRIAL FIBRILLATION (HCC): ICD-10-CM

## 2021-05-27 DIAGNOSIS — G47.33 OSA (OBSTRUCTIVE SLEEP APNEA): ICD-10-CM

## 2021-05-27 DIAGNOSIS — I48.91 ATRIAL FIBRILLATION WITH SLOW VENTRICULAR RESPONSE (HCC): ICD-10-CM

## 2021-05-27 PROCEDURE — 93000 ELECTROCARDIOGRAM COMPLETE: CPT | Performed by: INTERNAL MEDICINE

## 2021-05-27 PROCEDURE — 99214 OFFICE O/P EST MOD 30 MIN: CPT | Performed by: INTERNAL MEDICINE

## 2021-05-27 NOTE — PROGRESS NOTES
"  OFFICE FOLLOW UP     Date of Encounter:2021     Name: Smith Craven  : 1950  Address: [address] [facility] [place] [address]    PCP: Jeane Baird MD  [address] [place] [address]    Smith Craven is a 70 y.o. male.      Chief Complaint: shortness of breath     Problem List:   1.  Cardiac disease, multifactorial:              A. Non-obstructive CAD by cath, 2008.              B. Atrial fibrillation. CV = 2, on Eliquis  C. TTE, 12/15/18: biatrial enlargement, normal LVEF, mild MR, trivial pericardial effusion, mild PA hypertension  D. RHC 19: Severe biventricular elevation in diastolic pressures and severe PAH (pre-splenectomy)  E. LAUREN 19: EF 55%, mild-mod MR, LA and RA are dilated, saline test results positive with valsalva for PFO   F. Echo, 2020: biatrial enlargement, EF 52%,   2.    Obesity.  3.    Neurocardiogenic syncope.  4.    Cholelithiasis status post cholecystectomy, summer 2016:                          A. Procedure complicated by pneumonitis and need for blood transfusions.   5.    COPD and ANN MARIE, CPAP (stopped use in )                          A. Followed by Yossi Blake MD.  6.    Chronic lower extremity venous insufficiency.  7.    Chronic leukopenia and anemia - resolved.              A. Hypersplenism              B. 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    Gilbert's syndrome (doubt, see # 7B above).   9.    \"Fatty liver with no cirrhosis or amyloid\" diagnosed by liver biopsy, Summer 2018    10.  Hospitalization, 2018: shortness of breath                          A.  Bilateral pleural effusions/pericardial effusion noted.                          B.  IKD=218, negative troponins                          C.  \"MASSIVE\" spleenomegaly with no important ascites                           D.   Recent O2 for desaturation.             11.   Splenectomy, laparoscopic: hereditary elliptocytosis, 2019      Allergies:  No Known " Allergies    Current Medications:  Current Outpatient Medications   Medication Instructions   • apixaban (ELIQUIS) 5 mg, Oral, Every 12 Hours Scheduled   • atorvastatin (LIPITOR) 20 mg, Oral, Daily   • cholecalciferol (VITAMIN D3) 5,000 Units, Oral, Every Morning   • eplerenone (INSPRA) 25 MG tablet TAKE ONE TABLET BY MOUTH ONCE DAILY   • Fluticasone Furoate-Vilanterol (Breo Ellipta) 200-25 MCG/INH inhaler 1 puff, Inhalation, Daily - RT   • folic acid (FOLVITE) 1 mg, Oral, Every Morning   • furosemide (LASIX) 40 mg, Oral, 2 Times Daily   • metoprolol succinate XL (TOPROL-XL) 25 mg, Oral, Every Morning   • predniSONE (DELTASONE) 10 mg, Oral, Daily   • vitamin B-12 (CYANOCOBALAMIN) 2,500 mcg, Oral, Daily        History of Present Illness:  Smith Craven returns for work in follow up today for increased shortness of breath with exertion. He and his family had COVID back in February and he received outpatient BAM. He recovered fairly quickly but his wife was sick for several weeks. He was seen by Dr. Blake in April and Spiriva and Respimat were added. He has not seen an improvement in his symptoms. He was told to resume CPAP but has not due to concerns about cleaning his supplies. He is easily fatigued and reports his oxygen levels drop with activity. Pulmonary was not able to document drop to show need for home oxygen. He denies chest pain. He has noticed pulse rates in the 30s and at times has tachy palpitations. He is compliant with medications listed above and denies unusual bleeding with Plavix and Eliquis. He is living with his wife, son and nearly one-year-old grandson. He is very active in caring for his grandson.    The following portions of the patient's history were reviewed and updated as appropriate: allergies, current medications and problem list.    ROS: Pertinent positives as listed in the HPI.  All other systems reviewed and negative.    Objective:    Vitals:    05/27/21 1504 05/27/21 1505   BP:  "152/94 163/94   BP Location: Right arm Right arm   Patient Position: Sitting Standing   Pulse: 60 69   SpO2: 91% 91%   Weight: 120 kg (265 lb)    Height: 177.8 cm (70\")      Body mass index is 38.02 kg/m².    Physical Exam:  GENERAL: Alert, cooperative, in no acute distress.   HEENT: Normocephalic, no adenopathy, no jugular venous distention  HEART: No discrete PMI is noted. Regular rhythm, normal rate, and no murmurs, gallops, or rubs.   LUNGS: Clear to auscultation bilaterally. No wheezing, rales or ronchi.  ABDOMEN: Soft, bowel sounds present, non-tender   NEUROLOGIC: No focal abnormalities involving strength or sensation are noted.   EXTREMITIES: No clubbing, cyanosis, or edema noted.      Diagnostic Data:  No new labs available to review.         ECG 12 Lead    Date/Time: 5/27/2021 3:20 PM  Performed by: Brant Napier MD  Authorized by: Brant Napier MD   Comparison: compared with previous ECG from 1/14/2019  Similar to previous ECG  Rhythm: atrial fibrillation  Rate: bradycardic  BPM: 50    Clinical impression: abnormal EKG            Advance Care Planning   ACP discussion was held with the patient during this visit. Patient does not have an advance directive, declines further assistance.    Assessment and Plan:   1.  HARTMANN: Likely multifactorial with recent COVID infection, COPD, ANN MARIE (currently untreated), exercise intolerance, and obesity. We have asked him to talk to CPAP supplier about cleaning equipment needed so he can resume CPAP use.   2. Afib with slow VR: We will stop metoprolol today and place a MCOT today to determine rates and if symptoms correlate. He will continue Eliquis and samples were given today.   3.  We have urged him to schedule COVID vaccination as he is nearing the end of his 90 day waiting period.    We will call results of monitor and he will keep scheduled follow up in August 2021.     Scribed for Brant Napier MD by Jennifer Hammonds RN. 05/27/2021 15:45 EDT.       EMR " Dragon/Transcription Disclaimer:  Much of this encounter note is an electronic transcription/translation of spoken language to printed text.  The electronic translation of spoken language may permit erroneous, or at times, nonsensical words or phrases to be inadvertently transcribed.  Although I have reviewed the note for such errors, some may still exist.

## 2021-06-23 ENCOUNTER — TELEPHONE (OUTPATIENT)
Dept: CARDIOLOGY | Facility: CLINIC | Age: 71
End: 2021-06-23

## 2021-06-23 DIAGNOSIS — I48.91 ATRIAL FIBRILLATION WITH SLOW VENTRICULAR RESPONSE (HCC): Primary | ICD-10-CM

## 2021-06-23 NOTE — TELEPHONE ENCOUNTER
98% Afib burden with rates down to 30, off metoprolol. Increased HARTMANN    Per [facility] pt needs EP referral to [facility], [place] or [place].     Spoke with wife Joanne, pt is OFF metoprolol and is still awaiting cleaning supplies for CPAP machine. Karthikeyan does not think untreated ANN MARIE is sole cause of bradycardia.

## 2021-07-14 ENCOUNTER — OFFICE VISIT (OUTPATIENT)
Dept: CARDIOLOGY | Facility: CLINIC | Age: 71
End: 2021-07-14

## 2021-07-14 VITALS
HEIGHT: 70 IN | WEIGHT: 267 LBS | BODY MASS INDEX: 38.22 KG/M2 | DIASTOLIC BLOOD PRESSURE: 64 MMHG | OXYGEN SATURATION: 94 % | SYSTOLIC BLOOD PRESSURE: 128 MMHG | HEART RATE: 55 BPM

## 2021-07-14 DIAGNOSIS — I48.91 ATRIAL FIBRILLATION WITH SLOW VENTRICULAR RESPONSE (HCC): Primary | ICD-10-CM

## 2021-07-14 PROCEDURE — 93000 ELECTROCARDIOGRAM COMPLETE: CPT | Performed by: NURSE PRACTITIONER

## 2021-07-14 PROCEDURE — 99204 OFFICE O/P NEW MOD 45 MIN: CPT | Performed by: INTERNAL MEDICINE

## 2021-07-14 NOTE — PROGRESS NOTES
"Electrophysiology Clinic Consult     Smith Sunwer  1950 07/14/21      Jeane Baird MD  [address] [facility] / [place] [address]    Chief Complaint   Patient presents with   • HARTMANN      Referring Provider: Dr. Napier     Problem List:   1.  Permanent AF with slow ventricular response  1. CHADsVasc=3- on Eliquis  2. Noted to have AF with slow ventricular response during f/u with Dr. Napier, 5/27/21.   3. MCOT, 5/27/21, 15 day, 5/27/21-6/17/21, Max  bpm, Min HR 26 bpm, Avg HR 70 bpm. AF burden 98%,  HR to 30 bpm not associated with sleep, Pauses >2 seconds occurred 21 times with longest pause 3.8 seconds. PAC burden <1%, PVC burden 1%.   (off  Metoprolol during the monitor)   2. Cardiac disease, multifactorial:              A. Non-obstructive CAD by cath, January 2008.              B. Atrial fibrillation. CV = 2, on Eliquis  C. TTE, 12/15/18: biatrial enlargement, normal LVEF, mild MR, trivial pericardial effusion, mild PA hypertension  D. RHC 1/11/19: Severe biventricular elevation in diastolic pressures and severe PAH (pre-splenectomy)  E. LAUREN 1/11/19: EF 55%, mild-mod MR, LA and RA are dilated, saline test results positive with valsalva for PFO   F. Echo, 2/18/2020: biatrial enlargement, EF 52%,   2.    Obesity.  3.    Neurocardiogenic syncope.  4.    Cholelithiasis status post cholecystectomy, summer 2016:                          A. Procedure complicated by pneumonitis and need for blood transfusions.   5.    COPD and ANN MARIE, CPAP (stopped use in 2019)                          A. Followed by Yossi Blake MD.  6.    Chronic lower extremity venous insufficiency.  7.    Chronic leukopenia and anemia - resolved.              A. Hypersplenism              B. Splenectomy, 4/2019 ([facility])- hereditary elliptocytosis  8.    Gilbert's syndrome (doubt, see # 7B above).   9.    \"Fatty liver with no cirrhosis or amyloid\" diagnosed by liver biopsy, Summer 2018    10.  Hospitalization, 12/2018: shortness of " "breath                          A.  Bilateral pleural effusions/pericardial effusion noted.                          B.  ACF=361, negative troponins                          C.  \"MASSIVE\" spleenomegaly with no important ascites                                    D.   Recent O2 for desaturation.             11.   Surgical history: Splenectomy, laparoscopic: hereditary elliptocytosis, 4/18/2019      History of Present Illness:   Mr. Craven is a pleasant 70 year old WM with above stated PMH who presents today in consultation with Dr. Conley for further evaluation of his AF with slow ventricular response. He is referred today by Dr. Napier. He has permanent AFand is chronically anticoagulated with Eliquis - no known bleeding issues. He was seen by Dr. Napier in follow up 5/27/21 where he was noted to have increased fatigue, weakness, SOB/HARTMANN. No syncope noted. He has never been aware of palpitations or irregular heart beat. He did have COVID in Feb 2021 with outpatient infusion, and since has f/u with Dr. Blake with spiriva and respimat addition with minimal improvement of symptoms. He also was found to have heart rates at home in the 30s, and was placed on a 15 day MCOT. His MCOT report showed, 98% AF burden with Hrs in the 30s at times not associated with sleep while off BB. Pauses of 3 seconds also noted.  His wife states he is very weak and has no energy to get out and do errands or things around the house which has been going on for the last 6-8 months.     No Known Allergies      Current Outpatient Medications:   •  apixaban (ELIQUIS) 5 MG tablet tablet, Take 1 tablet by mouth Every 12 (Twelve) Hours., Disp: 180 tablet, Rfl: 3  •  Ascorbic Acid (VITAMIN C PO), Take 500 mg by mouth Daily., Disp: , Rfl:   •  atorvastatin (LIPITOR) 20 MG tablet, Take 1 tablet by mouth Daily., Disp: 30 tablet, Rfl: 11  •  cholecalciferol (VITAMIN D3) 1000 UNITS tablet, Take 5,000 Units by mouth every morning., Disp: , Rfl:   •  " eplerenone (INSPRA) 25 MG tablet, TAKE ONE TABLET BY MOUTH ONCE DAILY, Disp: 30 tablet, Rfl: 8  •  Fluticasone Furoate-Vilanterol (Breo Ellipta) 200-25 MCG/INH inhaler, Inhale 1 puff Daily., Disp: 60 each, Rfl: 3  •  furosemide (LASIX) 40 MG tablet, Take 1 tablet by mouth 2 (Two) Times a Day. (Patient taking differently: Take 40 mg by mouth Daily.), Disp: 180 tablet, Rfl: 2  •  vitamin B-12 (CYANOCOBALAMIN) 1000 MCG tablet, Take 2,500 mcg by mouth daily., Disp: , Rfl:     Social History     Socioeconomic History   • Marital status:      Spouse name: Not on file   • Number of children: Not on file   • Years of education: Not on file   • Highest education level: Not on file   Tobacco Use   • Smoking status: Never Smoker   • Smokeless tobacco: Never Used   Vaping Use   • Vaping Use: Never used   Substance and Sexual Activity   • Alcohol use: No   • Drug use: No   • Sexual activity: Defer     Partners: Female       Family History   Problem Relation Age of Onset   • Hypertension Other    • Arthritis Other    • Dementia Mother    • Hypertension Mother    • Atrial fibrillation Mother    • Coronary artery disease Father    • COPD Sister    • Arthritis Sister    • Arthritis Brother    • Hypertension Brother    • Diverticulitis Brother        REVIEW OF SYSTEMS:   CONST:  No weight loss, fever, chills, +weakness or + fatigue.   HEENT:  No visual loss, blurred vision, double vision, yellow sclerae.                   No hearing loss, congestion, sore throat.   SKIN:      No rashes, urticaria, ulcers, sores.     RESP:     + shortness of breath, hemoptysis, cough, sputum.   GI:           No anorexia, nausea, vomiting, diarrhea. No abdominal pain, melena.   :         No burning on urination, hematuria or increased frequency.  ENDO:    No diaphoresis, cold or heat intolerance. No polyuria or polydipsia.   NEURO:  No headache, dizziness, syncope, paralysis, ataxia, or parasthesias.                  No change in bowel or  "bladder control. No history of CVA/TIA  MUSC:    No muscle, back pain, joint pain or stiffness.   HEME:    No anemia, bleeding, bruising. No history of DVT/PE.  PSYCH:  No history of depression, anxiety    Vitals:    07/14/21 1554   BP: 128/64   BP Location: Left arm   Patient Position: Sitting   Pulse: 55   SpO2: 94%   Weight: 121 kg (267 lb)   Height: 177.8 cm (70\")               Physical Exam:  GEN: Well nourished, well-developed, no acute distress- overweight   HEENT: Normocephalic, atraumatic, PERRLA, moist mucous membranes  NECK: Supple, NO JVD, no thyromegaly, no lymphadenopathy  CARD: irregular, bradycardic  no murmur, gallop, rub  LUNGS: Clear to auscultation, normal respiratory effort  ABDOMEN: Soft, nontender, normal bowel sounds  EXTREMITIES: No gross deformities, no clubbing, cyanosis, or edema  SKIN: Warm, dry  NEURO: No focal deficits, alert and oriented x 3  PSYCHIATRIC: Normal affect and mood      I personally viewed and interpreted the patient's EKG/Telemetry/lab data    Lab Results   Component Value Date    GLUCOSE 75 04/08/2019    CALCIUM 8.4 (L) 04/21/2019     04/21/2019    K 4.2 04/21/2019    CO2 22 04/21/2019     04/21/2019    BUN 24 04/21/2019    CREATININE 1.16 04/21/2019    EGFRIFAFRI >60 04/21/2019    EGFRIFNONA 71 04/08/2019    BCR 17.3 04/08/2019    ANIONGAP 13 04/21/2019     Lab Results   Component Value Date    WBC 16.91 (H) 04/21/2019    HGB 10.5 (L) 04/21/2019    HCT 32.3 (L) 04/21/2019    .9 (H) 04/21/2019     04/21/2019     Lab Results   Component Value Date    INR 1.1 04/19/2019    INR 1.2 04/18/2019    INR 1.1 04/16/2019    PROTIME 11.4 04/19/2019    PROTIME 12.2 04/18/2019    PROTIME 11.6 04/16/2019     Lab Results   Component Value Date    TSH 2.816 01/22/2019       ECG 12 Lead    Date/Time: 7/14/2021 4:11 PM  Performed by: Gin Baird APRN  Authorized by: Gin Baird APRN   Comparison: compared with previous ECG from 5/27/2021  Similar to " previous ECG  Rhythm: atrial fibrillation  BPM: 60              ICD-10-CM ICD-9-CM   1. Atrial fibrillation with slow ventricular response (CMS/HCC)  I48.91 427.31     Assessment and Plan:   1) Permanent atrial fibrillation with bradycardic response symptomatic non-reversible  -CHADSVASC: 3 on Eliquis 5mg BID - continue  -Recent MCOT with heart rates  Avg 70 with pause of 3.8 seconds while off BBL/ AVN agents   -Echo 2/18/2020- EF 52%, biatrial enlargement, moderate MR, prior LAUREN has demonstrated an atrial septal aneurysm and PFO  -Referred for PPM placement - risks, benefits, alternatives reviewed - he would like to proceed. Hold eliquis two days prior to PM implant      Scribed for Seferino Conley MD by SYDNEE Pond. 7/14/2021  16:16 EDT     I, Seferino Conley MD, personally performed the services described in this documentation as scribed by the above named individual in my presence, and it is both accurate and complete.  7/14/2021  16:46 EDT

## 2021-07-27 DIAGNOSIS — I48.91 ATRIAL FIBRILLATION WITH SLOW VENTRICULAR RESPONSE (HCC): Primary | ICD-10-CM

## 2021-07-30 ENCOUNTER — PREP FOR SURGERY (OUTPATIENT)
Dept: OTHER | Facility: HOSPITAL | Age: 71
End: 2021-07-30

## 2021-07-30 DIAGNOSIS — I48.91 ATRIAL FIBRILLATION WITH SLOW VENTRICULAR RESPONSE (HCC): Primary | ICD-10-CM

## 2021-07-30 RX ORDER — SODIUM CHLORIDE 0.9 % (FLUSH) 0.9 %
10 SYRINGE (ML) INJECTION AS NEEDED
Status: CANCELLED | OUTPATIENT
Start: 2021-07-30

## 2021-07-30 RX ORDER — NITROGLYCERIN 0.4 MG/1
0.4 TABLET SUBLINGUAL
Status: CANCELLED | OUTPATIENT
Start: 2021-07-30

## 2021-07-30 RX ORDER — SODIUM CHLORIDE 0.9 % (FLUSH) 0.9 %
3 SYRINGE (ML) INJECTION EVERY 12 HOURS SCHEDULED
Status: CANCELLED | OUTPATIENT
Start: 2021-07-30

## 2021-07-30 RX ORDER — ACETAMINOPHEN 325 MG/1
650 TABLET ORAL EVERY 4 HOURS PRN
Status: CANCELLED | OUTPATIENT
Start: 2021-07-30

## 2021-07-30 RX ORDER — SODIUM CHLORIDE 9 MG/ML
1 INJECTION, SOLUTION INTRAVENOUS CONTINUOUS
Status: CANCELLED | OUTPATIENT
Start: 2021-07-30 | End: 2021-07-30

## 2021-07-30 RX ORDER — CEFAZOLIN SODIUM 2 G/100ML
2 INJECTION, SOLUTION INTRAVENOUS ONCE
Status: CANCELLED | OUTPATIENT
Start: 2021-07-30 | End: 2021-07-30

## 2021-08-06 ENCOUNTER — APPOINTMENT (OUTPATIENT)
Dept: PREADMISSION TESTING | Facility: HOSPITAL | Age: 71
End: 2021-08-06

## 2021-08-06 ENCOUNTER — PRE-ADMISSION TESTING (OUTPATIENT)
Dept: PREADMISSION TESTING | Facility: HOSPITAL | Age: 71
End: 2021-08-06

## 2021-08-06 DIAGNOSIS — I48.91 ATRIAL FIBRILLATION WITH SLOW VENTRICULAR RESPONSE (HCC): ICD-10-CM

## 2021-08-06 LAB
ANION GAP SERPL CALCULATED.3IONS-SCNC: 10 MMOL/L (ref 5–15)
BUN SERPL-MCNC: 14 MG/DL (ref 8–23)
BUN/CREAT SERPL: 14.9 (ref 7–25)
CALCIUM SPEC-SCNC: 9.7 MG/DL (ref 8.6–10.5)
CHLORIDE SERPL-SCNC: 106 MMOL/L (ref 98–107)
CO2 SERPL-SCNC: 24 MMOL/L (ref 22–29)
CREAT SERPL-MCNC: 0.94 MG/DL (ref 0.76–1.27)
DEPRECATED RDW RBC AUTO: 54.7 FL (ref 37–54)
ERYTHROCYTE [DISTWIDTH] IN BLOOD BY AUTOMATED COUNT: 15.9 % (ref 12.3–15.4)
GFR SERPL CREATININE-BSD FRML MDRD: 79 ML/MIN/1.73
GLUCOSE SERPL-MCNC: 95 MG/DL (ref 65–99)
HBA1C MFR BLD: 5.8 % (ref 4.8–5.6)
HCT VFR BLD AUTO: 48.1 % (ref 37.5–51)
HGB BLD-MCNC: 16.1 G/DL (ref 13–17.7)
MCH RBC QN AUTO: 31.5 PG (ref 26.6–33)
MCHC RBC AUTO-ENTMCNC: 33.5 G/DL (ref 31.5–35.7)
MCV RBC AUTO: 94.1 FL (ref 79–97)
PLATELET # BLD AUTO: 384 10*3/MM3 (ref 140–450)
PMV BLD AUTO: 9.3 FL (ref 6–12)
POTASSIUM SERPL-SCNC: 3.9 MMOL/L (ref 3.5–5.2)
RBC # BLD AUTO: 5.11 10*6/MM3 (ref 4.14–5.8)
SARS-COV-2 RNA PNL SPEC NAA+PROBE: NOT DETECTED
SODIUM SERPL-SCNC: 140 MMOL/L (ref 136–145)
WBC # BLD AUTO: 11.08 10*3/MM3 (ref 3.4–10.8)

## 2021-08-06 PROCEDURE — 36415 COLL VENOUS BLD VENIPUNCTURE: CPT

## 2021-08-06 PROCEDURE — C9803 HOPD COVID-19 SPEC COLLECT: HCPCS

## 2021-08-06 PROCEDURE — 85027 COMPLETE CBC AUTOMATED: CPT

## 2021-08-06 PROCEDURE — 83036 HEMOGLOBIN GLYCOSYLATED A1C: CPT | Performed by: NURSE PRACTITIONER

## 2021-08-06 PROCEDURE — 80048 BASIC METABOLIC PNL TOTAL CA: CPT

## 2021-08-06 PROCEDURE — U0004 COV-19 TEST NON-CDC HGH THRU: HCPCS

## 2021-08-06 RX ORDER — ACETAMINOPHEN 160 MG
2000 TABLET,DISINTEGRATING ORAL DAILY
COMMUNITY
End: 2023-03-01

## 2021-08-06 RX ORDER — IBUPROFEN 800 MG/1
800 TABLET ORAL EVERY 8 HOURS PRN
COMMUNITY

## 2021-08-06 NOTE — DISCHARGE INSTRUCTIONS

## 2021-08-06 NOTE — PAT

## 2021-08-09 ENCOUNTER — HOSPITAL ENCOUNTER (OUTPATIENT)
Facility: HOSPITAL | Age: 71
Discharge: HOME | End: 2021-08-09
Attending: INTERNAL MEDICINE | Admitting: INTERNAL MEDICINE

## 2021-08-09 VITALS
BODY MASS INDEX: 37.18 KG/M2 | TEMPERATURE: 98.4 F | OXYGEN SATURATION: 90 % | HEART RATE: 70 BPM | HEIGHT: 70 IN | WEIGHT: 259.7 LBS | RESPIRATION RATE: 20 BRPM | DIASTOLIC BLOOD PRESSURE: 107 MMHG | SYSTOLIC BLOOD PRESSURE: 152 MMHG

## 2021-08-09 DIAGNOSIS — I48.91 ATRIAL FIBRILLATION WITH SLOW VENTRICULAR RESPONSE (HCC): ICD-10-CM

## 2021-08-09 DIAGNOSIS — I48.0 PAROXYSMAL ATRIAL FIBRILLATION (HCC): ICD-10-CM

## 2021-08-09 DIAGNOSIS — I27.20 PULMONARY HYPERTENSION (HCC): ICD-10-CM

## 2021-08-09 PROCEDURE — 0 IOPAMIDOL PER 1 ML: Performed by: INTERNAL MEDICINE

## 2021-08-09 PROCEDURE — C1769 GUIDE WIRE: HCPCS | Performed by: INTERNAL MEDICINE

## 2021-08-09 PROCEDURE — 33274 TCAT INSJ/RPL PERM LDLS PM: CPT | Performed by: INTERNAL MEDICINE

## 2021-08-09 PROCEDURE — 63710000001 ACETAMINOPHEN 325 MG TABLET: Performed by: INTERNAL MEDICINE

## 2021-08-09 PROCEDURE — 25010000003 CEFAZOLIN IN DEXTROSE 2-4 GM/100ML-% SOLUTION: Performed by: NURSE PRACTITIONER

## 2021-08-09 PROCEDURE — 99152 MOD SED SAME PHYS/QHP 5/>YRS: CPT | Performed by: INTERNAL MEDICINE

## 2021-08-09 PROCEDURE — 25010000002 ONDANSETRON PER 1 MG: Performed by: INTERNAL MEDICINE

## 2021-08-09 PROCEDURE — C1894 INTRO/SHEATH, NON-LASER: HCPCS | Performed by: INTERNAL MEDICINE

## 2021-08-09 PROCEDURE — C1786 PMKR, SINGLE, RATE-RESP: HCPCS | Performed by: INTERNAL MEDICINE

## 2021-08-09 PROCEDURE — 25010000002 FENTANYL CITRATE (PF) 50 MCG/ML SOLUTION: Performed by: INTERNAL MEDICINE

## 2021-08-09 PROCEDURE — A9270 NON-COVERED ITEM OR SERVICE: HCPCS | Performed by: INTERNAL MEDICINE

## 2021-08-09 PROCEDURE — 25010000002 HEPARIN (PORCINE) PER 1000 UNITS: Performed by: INTERNAL MEDICINE

## 2021-08-09 PROCEDURE — 99153 MOD SED SAME PHYS/QHP EA: CPT | Performed by: INTERNAL MEDICINE

## 2021-08-09 PROCEDURE — 25010000003 LIDOCAINE 1 % SOLUTION: Performed by: INTERNAL MEDICINE

## 2021-08-09 PROCEDURE — 25010000002 MIDAZOLAM PER 1 MG: Performed by: INTERNAL MEDICINE

## 2021-08-09 DEVICE — GEN PM MICRA TRANSCATHETER LDFR: Type: IMPLANTABLE DEVICE | Status: FUNCTIONAL

## 2021-08-09 RX ORDER — ONDANSETRON 2 MG/ML
4 INJECTION INTRAMUSCULAR; INTRAVENOUS EVERY 6 HOURS PRN
Status: DISCONTINUED | OUTPATIENT
Start: 2021-08-09 | End: 2021-08-09 | Stop reason: HOSPADM

## 2021-08-09 RX ORDER — ACETAMINOPHEN 650 MG/1
650 SUPPOSITORY RECTAL EVERY 4 HOURS PRN
Status: DISCONTINUED | OUTPATIENT
Start: 2021-08-09 | End: 2021-08-09 | Stop reason: HOSPADM

## 2021-08-09 RX ORDER — SODIUM CHLORIDE 0.9 % (FLUSH) 0.9 %
3 SYRINGE (ML) INJECTION EVERY 12 HOURS SCHEDULED
Status: DISCONTINUED | OUTPATIENT
Start: 2021-08-09 | End: 2021-08-09 | Stop reason: HOSPADM

## 2021-08-09 RX ORDER — ONDANSETRON 2 MG/ML
INJECTION INTRAMUSCULAR; INTRAVENOUS AS NEEDED
Status: DISCONTINUED | OUTPATIENT
Start: 2021-08-09 | End: 2021-08-09 | Stop reason: HOSPADM

## 2021-08-09 RX ORDER — ACETAMINOPHEN 325 MG/1
650 TABLET ORAL EVERY 4 HOURS PRN
Status: DISCONTINUED | OUTPATIENT
Start: 2021-08-09 | End: 2021-08-09 | Stop reason: HOSPADM

## 2021-08-09 RX ORDER — LIDOCAINE HYDROCHLORIDE 10 MG/ML
INJECTION, SOLUTION INFILTRATION; PERINEURAL AS NEEDED
Status: DISCONTINUED | OUTPATIENT
Start: 2021-08-09 | End: 2021-08-09 | Stop reason: HOSPADM

## 2021-08-09 RX ORDER — HEPARIN SODIUM 1000 [USP'U]/ML
INJECTION, SOLUTION INTRAVENOUS; SUBCUTANEOUS AS NEEDED
Status: DISCONTINUED | OUTPATIENT
Start: 2021-08-09 | End: 2021-08-09 | Stop reason: HOSPADM

## 2021-08-09 RX ORDER — NITROGLYCERIN 0.4 MG/1
0.4 TABLET SUBLINGUAL
Status: DISCONTINUED | OUTPATIENT
Start: 2021-08-09 | End: 2021-08-09 | Stop reason: HOSPADM

## 2021-08-09 RX ORDER — SODIUM CHLORIDE 0.9 % (FLUSH) 0.9 %
10 SYRINGE (ML) INJECTION AS NEEDED
Status: DISCONTINUED | OUTPATIENT
Start: 2021-08-09 | End: 2021-08-09 | Stop reason: HOSPADM

## 2021-08-09 RX ORDER — BUPIVACAINE HYDROCHLORIDE 5 MG/ML
INJECTION, SOLUTION EPIDURAL; INTRACAUDAL AS NEEDED
Status: DISCONTINUED | OUTPATIENT
Start: 2021-08-09 | End: 2021-08-09 | Stop reason: HOSPADM

## 2021-08-09 RX ORDER — SODIUM CHLORIDE 9 MG/ML
INJECTION, SOLUTION INTRAVENOUS CONTINUOUS PRN
Status: COMPLETED | OUTPATIENT
Start: 2021-08-09 | End: 2021-08-09

## 2021-08-09 RX ORDER — CEFAZOLIN SODIUM 2 G/100ML
2 INJECTION, SOLUTION INTRAVENOUS ONCE
Status: COMPLETED | OUTPATIENT
Start: 2021-08-09 | End: 2021-08-09

## 2021-08-09 RX ORDER — SODIUM CHLORIDE 9 MG/ML
1 INJECTION, SOLUTION INTRAVENOUS CONTINUOUS
Status: ACTIVE | OUTPATIENT
Start: 2021-08-09 | End: 2021-08-09

## 2021-08-09 RX ORDER — MIDAZOLAM HYDROCHLORIDE 1 MG/ML
INJECTION INTRAMUSCULAR; INTRAVENOUS AS NEEDED
Status: DISCONTINUED | OUTPATIENT
Start: 2021-08-09 | End: 2021-08-09 | Stop reason: HOSPADM

## 2021-08-09 RX ORDER — HYDROCODONE BITARTRATE AND ACETAMINOPHEN 5; 325 MG/1; MG/1
1 TABLET ORAL EVERY 4 HOURS PRN
Status: DISCONTINUED | OUTPATIENT
Start: 2021-08-09 | End: 2021-08-09 | Stop reason: HOSPADM

## 2021-08-09 RX ORDER — FENTANYL CITRATE 50 UG/ML
INJECTION, SOLUTION INTRAMUSCULAR; INTRAVENOUS AS NEEDED
Status: DISCONTINUED | OUTPATIENT
Start: 2021-08-09 | End: 2021-08-09 | Stop reason: HOSPADM

## 2021-08-09 RX ADMIN — SODIUM CHLORIDE 1 ML/KG/HR: 9 INJECTION, SOLUTION INTRAVENOUS at 11:40

## 2021-08-09 RX ADMIN — CEFAZOLIN SODIUM 2 G: 2 INJECTION, SOLUTION INTRAVENOUS at 12:58

## 2021-08-09 RX ADMIN — ACETAMINOPHEN 650 MG: 325 TABLET ORAL at 18:09

## 2021-08-10 ENCOUNTER — CALL CENTER PROGRAMS (OUTPATIENT)
Dept: CALL CENTER | Facility: HOSPITAL | Age: 71
End: 2021-08-10

## 2021-08-10 NOTE — OUTREACH NOTE
PCI/Device Survey      Responses   Facility patient discharged from?  [facility]   Procedure date  08/09/21   Procedure (if device, specify in description)  Device   Device Description  Medtronic VVI Leadless pacemaker   Performing MD Dr. Seferino Conley   Attempt successful?  No   Unsuccessful attempts  Attempt 1          Michelle Brown RN

## 2021-08-10 NOTE — OUTREACH NOTE
PCI/Device Survey      Responses   Facility patient discharged from?  [facility]   Procedure date  08/09/21   Procedure (if device, specify in description)  Device   Device Description  Medtronic VVI Leadless pacemaker   Performing MD Dr. Seferino Conley   Attempt successful?  Yes   Call start time  1522   Call end time  1527   Has the patient had any of the following symptoms since discharge?  -- [N/A]   Is the patient taking prescribed medications:  -- [will restart AC]   Nursing intervention  Reminded to continue to take prescribed medications   Does the patient have any of the following symptoms related to the cath/surgical site?  -- [N/A]   Nursing intervention  Patient education provided   Does the patient have an appointment scheduled with the cardiologist?  Yes   Appointment comments  Sees Dr Napier and will expect f/u call from Dr Conley   Did the patient feel prepared to go home on the same day as the procedure?  Yes   Is the patient satisfied with the same day discharge process?  Yes   Discharge process comments  Groin access intact and he will take off bandage in 1-2 days   PCI/Device call completed  Yes          Michelle Brown RN

## 2021-08-24 ENCOUNTER — OFFICE VISIT (OUTPATIENT)
Dept: CARDIOLOGY | Facility: CLINIC | Age: 71
End: 2021-08-24

## 2021-08-24 VITALS
HEART RATE: 85 BPM | OXYGEN SATURATION: 95 % | WEIGHT: 262 LBS | SYSTOLIC BLOOD PRESSURE: 163 MMHG | HEIGHT: 70 IN | BODY MASS INDEX: 37.51 KG/M2 | DIASTOLIC BLOOD PRESSURE: 116 MMHG

## 2021-08-24 DIAGNOSIS — I48.91 ATRIAL FIBRILLATION WITH SLOW VENTRICULAR RESPONSE (HCC): Primary | ICD-10-CM

## 2021-08-24 DIAGNOSIS — R06.09 DOE (DYSPNEA ON EXERTION): ICD-10-CM

## 2021-08-24 DIAGNOSIS — I25.10 CORONARY ARTERY DISEASE INVOLVING NATIVE CORONARY ARTERY OF NATIVE HEART, ANGINA PRESENCE UNSPECIFIED: ICD-10-CM

## 2021-08-24 DIAGNOSIS — I10 ESSENTIAL HYPERTENSION: ICD-10-CM

## 2021-08-24 PROCEDURE — 99214 OFFICE O/P EST MOD 30 MIN: CPT | Performed by: INTERNAL MEDICINE

## 2021-08-24 RX ORDER — LOSARTAN POTASSIUM 50 MG/1
50 TABLET ORAL DAILY
Qty: 30 TABLET | Refills: 11 | Status: SHIPPED | OUTPATIENT
Start: 2021-08-24 | End: 2022-10-05 | Stop reason: ALTCHOICE

## 2021-08-24 RX ORDER — PREDNISONE 20 MG/1
10 TABLET ORAL DAILY
COMMUNITY
Start: 2021-08-18 | End: 2021-11-10

## 2021-08-24 RX ORDER — AMOXICILLIN AND CLAVULANATE POTASSIUM 875; 125 MG/1; MG/1
TABLET, FILM COATED ORAL 2 TIMES DAILY
COMMUNITY
Start: 2021-08-18 | End: 2021-11-10

## 2021-08-24 RX ORDER — ATORVASTATIN CALCIUM 20 MG/1
20 TABLET, FILM COATED ORAL NIGHTLY
Qty: 90 TABLET | Refills: 3 | Status: SHIPPED | OUTPATIENT
Start: 2021-08-24 | End: 2022-10-17

## 2021-08-24 NOTE — PROGRESS NOTES
"  OFFICE FOLLOW UP     Date of Encounter:2021     Name: Smith Craven  : 1950  Address: [address] [facility] [place] [address]    PCP: Jeane Baird MD  [address] [facility] [place] [address]    Smith Craven is a 70 y.o. male.      Chief Complaint: Follow up of dyspnea, persistent Afib    Problem List:   1.  Cardiac disease, multifactorial:              A. Non-obstructive CAD by cath, 2008.              B. Atrial fibrillation. CV = 2, on Eliquis  C. TTE, 12/15/18: biatrial enlargement, normal LVEF, mild MR, trivial pericardial effusion, mild PA hypertension  D. RHC 19: Severe biventricular elevation in diastolic pressures and severe PAH (pre-splenectomy)  E. LAUREN 19: EF 55%, mild-mod MR, LA and RA are dilated, saline test results positive with valsalva for PFO    F. Echo, 2020: biatrial enlargement, EF 52%    g. Atrial fibrillation with slow VR, summer 2021   A. Leadless PPM (MDT), 2021 ([facility]).  2.    Obesity.  3.    Neurocardiogenic syncope.  4.    Cholelithiasis status post cholecystectomy, summer 2016:                          A. Procedure complicated by pneumonitis and need for blood transfusions.   5.    COPD and ANN MARIE, CPAP (stopped use in )                          A. Followed by Yossi Blake MD.  6.    Chronic lower extremity venous insufficiency.  7.    Chronic leukopenia and anemia - resolved.              A. Hypersplenism              B. 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    Gilbert's syndrome (doubt, see # 7B above).   9.    \"Fatty liver with no cirrhosis or amyloid\" diagnosed by liver biopsy, Summer 2018    10.  Hospitalization, 2018: shortness of breath                          A.  Bilateral pleural effusions/pericardial effusion noted.                          B.  SFI=745, negative troponins                          C.  \"MASSIVE\" spleenomegaly with no important ascites                                    D.   Recent O2 for " desaturation.             11.   Splenectomy, laparoscopic: hereditary elliptocytosis, 4/18/2019   12.    COVID-19 positive with outpatient BAM tx, 2/2021.    Allergies:  No Known Allergies    Current Medications:  Current Outpatient Medications   Medication Instructions   • amoxicillin-clavulanate (AUGMENTIN) 875-125 MG per tablet 2 times daily   • apixaban (ELIQUIS) 5 mg, Oral, Every 12 Hours Scheduled, First dose Tuesday evening 8/10/21   • Ascorbic Acid (VITAMIN C PO) 500 mg, Oral, Daily   • atorvastatin (LIPITOR) 20 mg, Oral, Daily   • eplerenone (INSPRA) 25 MG tablet TAKE ONE TABLET BY MOUTH ONCE DAILY   • Fluticasone Furoate-Vilanterol (Breo Ellipta) 200-25 MCG/INH inhaler 1 puff, Inhalation, Daily - RT   • furosemide (LASIX) 40 mg, Oral, 2 Times Daily   • ibuprofen (ADVIL,MOTRIN) 800 mg, Oral, Every 8 Hours PRN   • predniSONE (DELTASONE) 10 mg, Daily   • vitamin B-12 (CYANOCOBALAMIN) 2,500 mcg, Oral, Daily   • Vitamin D3 2,000 Units, Oral, Daily        History of Present Illness: Smith Craven returns for scheduled follow up today. Due to severe bradycardia off metoprolol and persistent atrial fibrillation [facility] placed a leadless pacemaker earlier this month. He is now taking prednisone prescribed by pulmonary and shortness of breath has improved. Home blood pressures are above 140 systolic. He is active caring for his young grandson. He has not yet received COVID vaccinations after ge COVID and receiving BAM last February. He plans to get vaccinated soon.      The following portions of the patient's history were reviewed and updated as appropriate: allergies, current medications and problem list.    ROS: Pertinent positives as listed in the HPI.  All other systems reviewed and negative.    Objective:    Vitals:    08/24/21 1055 08/24/21 1056   BP: 164/86 (!) 163/116   BP Location: Right arm Right arm   Patient Position: Sitting Standing   Pulse: 76 85   SpO2: 95%    Weight: 119 kg (262 lb)   "  Height: 177.8 cm (70\")      Body mass index is 37.59 kg/m².    Physical Exam:  GENERAL: Alert, cooperative, in no acute distress.   HEENT: Normocephalic, no adenopathy, no jugular venous distention  HEART: No discrete PMI is noted. Regular rhythm, normal rate, and no murmurs, gallops, or rubs.   LUNGS: Clear to auscultation bilaterally. No wheezing, rales or ronchi.  ABDOMEN: Soft, bowel sounds present, non-tender, obese  NEUROLOGIC: No focal abnormalities involving strength or sensation are noted.   EXTREMITIES: No clubbing, cyanosis, or edema noted.      Diagnostic Data:    Lab Results   Component Value Date    GLUCOSE 95 08/06/2021    CALCIUM 9.7 08/06/2021     08/06/2021    K 3.9 08/06/2021    CO2 24.0 08/06/2021     08/06/2021    BUN 14 08/06/2021    CREATININE 0.94 08/06/2021    EGFRIFAFRI >60 04/21/2019    EGFRIFNONA 79 08/06/2021    BCR 14.9 08/06/2021    ANIONGAP 10.0 08/06/2021      Lab Results   Component Value Date    WBC 11.08 (H) 08/06/2021    HGB 16.1 08/06/2021    HCT 48.1 08/06/2021    MCV 94.1 08/06/2021     08/06/2021      Lab Results   Component Value Date    HGBA1C 5.80 (H) 08/06/2021      Procedures    Assessment and Plan:   1.  Persistent atrial fib/bradycardia: Leadless pacemaker with [facility] earlier this month. He was told to plug in home monitor and send readings as scheduled. He has our contact information if he needs assistance with this. Continue Eliquis, samples given today.   2.  HTN:  Elevated today and at home, may be related to prednisone. We will add losartan 50 mg daily and check BMP in 2 weeks. Continue other medications. He will check home readings and call these to Jennifer in 1-2 weeks. He understands target is less than 130/80.   4.  HARTMANN: Improved. Continue close follow up with pulmonary.    5. COVID vaccinations encouraged.     I will see Smith Craven back in 6 months or sooner on an as needed basis.    Scribed for Brant Napier MD by Jennifer BAILEY" ROSENDO Hammonds. 08/24/2021 11:46 EDT.     EMR Dragon/Transcription Disclaimer:  Much of this encounter note is an electronic transcription/translation of spoken language to printed text.  The electronic translation of spoken language may permit erroneous, or at times, nonsensical words or phrases to be inadvertently transcribed.  Although I have reviewed the note for such errors, some may still exist.

## 2021-09-09 ENCOUNTER — TELEPHONE (OUTPATIENT)
Dept: CARDIOLOGY | Facility: CLINIC | Age: 71
End: 2021-09-09

## 2021-09-09 LAB
BUN SERPL-MCNC: 17 MG/DL (ref 8–27)
BUN/CREAT SERPL: 15 (ref 10–24)
CALCIUM SERPL-MCNC: 9.4 MG/DL (ref 8.6–10.2)
CHLORIDE SERPL-SCNC: 107 MMOL/L (ref 96–106)
CO2 SERPL-SCNC: 24 MMOL/L (ref 20–29)
CREAT SERPL-MCNC: 1.1 MG/DL (ref 0.76–1.27)
GLUCOSE SERPL-MCNC: 132 MG/DL (ref 65–99)
POTASSIUM SERPL-SCNC: 3.6 MMOL/L (ref 3.5–5.2)
SODIUM SERPL-SCNC: 144 MMOL/L (ref 134–144)

## 2021-09-09 NOTE — TELEPHONE ENCOUNTER
Lab Results   Component Value Date    GLUCOSE 95 08/06/2021    CALCIUM 9.4 09/08/2021     09/08/2021    K 3.6 09/08/2021    CO2 24 09/08/2021     (H) 09/08/2021    BUN 17 09/08/2021    CREATININE 1.10 09/08/2021    EGFRIFAFRI 77 09/08/2021    EGFRIFNONA 67 09/08/2021    BCR 15 09/08/2021    ANIONGAP 10.0 08/06/2021     Continue current medications

## 2021-09-14 ENCOUNTER — TELEPHONE (OUTPATIENT)
Dept: CARDIOLOGY | Facility: CLINIC | Age: 71
End: 2021-09-14

## 2021-09-25 PROCEDURE — 93294 REM INTERROG EVL PM/LDLS PM: CPT | Performed by: INTERNAL MEDICINE

## 2021-09-25 PROCEDURE — 93296 REM INTERROG EVL PM/IDS: CPT | Performed by: INTERNAL MEDICINE

## 2021-11-10 ENCOUNTER — OFFICE VISIT (OUTPATIENT)
Dept: CARDIOLOGY | Facility: CLINIC | Age: 71
End: 2021-11-10

## 2021-11-10 VITALS
SYSTOLIC BLOOD PRESSURE: 138 MMHG | HEART RATE: 85 BPM | BODY MASS INDEX: 37.65 KG/M2 | DIASTOLIC BLOOD PRESSURE: 86 MMHG | HEIGHT: 70 IN | OXYGEN SATURATION: 97 % | WEIGHT: 263 LBS

## 2021-11-10 DIAGNOSIS — I48.91 ATRIAL FIBRILLATION WITH SLOW VENTRICULAR RESPONSE (HCC): Primary | ICD-10-CM

## 2021-11-10 DIAGNOSIS — I50.33 ACUTE ON CHRONIC DIASTOLIC HEART FAILURE (HCC): ICD-10-CM

## 2021-11-10 DIAGNOSIS — I27.20 PULMONARY HYPERTENSION (HCC): ICD-10-CM

## 2021-11-10 PROCEDURE — 99213 OFFICE O/P EST LOW 20 MIN: CPT | Performed by: PHYSICIAN ASSISTANT

## 2021-11-10 RX ORDER — ACETAMINOPHEN 500 MG
500 TABLET ORAL EVERY 6 HOURS PRN
COMMUNITY

## 2021-11-10 NOTE — PROGRESS NOTES
"Electrophysiology Clinic Consult     Smith Sunwer  1950      11/10/21      Jeane Baird MD  [address] [facility] / [place] [address]    Chief Complaint   Patient presents with   • Atrial Fibrillation with slow ventricular response      Referring Provider: Dr. Napier     Problem List:   1.  Permanent AF with slow ventricular response  1. CHADsVasc=3- on Eliquis  2. Noted to have AF with slow ventricular response during f/u with Dr. Napier, 5/27/21.   3. MCOT, 5/27/21, 15 day, 5/27/21-6/17/21, Max  bpm, Min HR 26 bpm, Avg HR 70 bpm. AF burden 98%,  HR to 30 bpm not associated with sleep, Pauses >2 seconds occurred 21 times with longest pause 3.8 seconds. PAC burden <1%, PVC burden 1%.   (off  Metoprolol during the monitor)   2. Cardiac disease, multifactorial:              A. Non-obstructive CAD by cath, January 2008.              B. Atrial fibrillation. CV = 2, on Eliquis  C. TTE, 12/15/18: biatrial enlargement, normal LVEF, mild MR, trivial pericardial effusion, mild PA hypertension  D. RHC 1/11/19: Severe biventricular elevation in diastolic pressures and severe PAH (pre-splenectomy)  E. LAUREN 1/11/19: EF 55%, mild-mod MR, LA and RA are dilated, saline test results positive with valsalva for PFO   F. Echo, 2/18/2020: biatrial enlargement, EF 52%,   2.    Obesity.  3.    Neurocardiogenic syncope.  4.    Cholelithiasis status post cholecystectomy, summer 2016:                          A. Procedure complicated by pneumonitis and need for blood transfusions.   5.    COPD and ANN MARIE, CPAP (stopped use in 2019)                          A. Followed by Yossi Blake MD.  6.    Chronic lower extremity venous insufficiency.  7.    Chronic leukopenia and anemia - resolved.              A. Hypersplenism              B. Splenectomy, 4/2019 ([facility])- hereditary elliptocytosis  8.    Gilbert's syndrome (doubt, see # 7B above).   9.    \"Fatty liver with no cirrhosis or amyloid\" diagnosed by liver biopsy, Summer " "2018    10.  Hospitalization, 12/2018: shortness of breath                          A.  Bilateral pleural effusions/pericardial effusion noted.                          B.  LQD=886, negative troponins                          C.  \"MASSIVE\" spleenomegaly with no important ascites                                    D.   Recent O2 for desaturation.             11.   Surgical history: Splenectomy, laparoscopic: hereditary  elliptocytosis, 4/18/2019      History of Present Illness:   Mr. Craven is a pleasant 70 year old WM presents for follow up regarding AFib, Leadless PPM. Since we last saw the pt, pt denies any sustained palpitation episodes, SOB, CP, LH, and dizziness. Denies any hospitalizations, ER visits, bleeding, or TIA/CVA symptoms. Overall feels well since the pacemaker placed.      No Known Allergies      Current Outpatient Medications:   •  acetaminophen (TYLENOL) 500 MG tablet, Take 500 mg by mouth Every 6 (Six) Hours As Needed for Mild Pain ., Disp: , Rfl:   •  apixaban (ELIQUIS) 5 MG tablet tablet, Take 1 tablet by mouth Every 12 (Twelve) Hours. First dose Tuesday evening 8/10/21, Disp: 180 tablet, Rfl: 3  •  Ascorbic Acid (VITAMIN C PO), Take 500 mg by mouth Daily., Disp: , Rfl:   •  atorvastatin (LIPITOR) 20 MG tablet, Take 1 tablet by mouth Every Night., Disp: 90 tablet, Rfl: 3  •  Cholecalciferol (Vitamin D3) 50 MCG (2000 UT) capsule, Take 2,000 Units by mouth Daily., Disp: , Rfl:   •  eplerenone (INSPRA) 25 MG tablet, TAKE ONE TABLET BY MOUTH ONCE DAILY (Patient taking differently: Take 25 mg by mouth Daily.), Disp: 30 tablet, Rfl: 8  •  Fluticasone Furoate-Vilanterol (Breo Ellipta) 200-25 MCG/INH inhaler, Inhale 1 puff Daily., Disp: 60 each, Rfl: 3  •  furosemide (LASIX) 40 MG tablet, Take 1 tablet by mouth 2 (Two) Times a Day. (Patient taking differently: Take 40 mg by mouth Daily.), Disp: 180 tablet, Rfl: 2  •  ibuprofen (ADVIL,MOTRIN) 800 MG tablet, Take 800 mg by mouth Every 8 (Eight) Hours As " "Needed for Mild Pain ., Disp: , Rfl:   •  losartan (COZAAR) 50 MG tablet, Take 1 tablet by mouth Daily., Disp: 30 tablet, Rfl: 11  •  vitamin B-12 (CYANOCOBALAMIN) 1000 MCG tablet, Take 2,500 mcg by mouth daily., Disp: , Rfl:     Social History     Socioeconomic History   • Marital status:    Tobacco Use   • Smoking status: Never Smoker   • Smokeless tobacco: Never Used   Vaping Use   • Vaping Use: Never used   Substance and Sexual Activity   • Alcohol use: No   • Drug use: No   • Sexual activity: Defer     Partners: Female       Family History   Problem Relation Age of Onset   • Hypertension Other    • Arthritis Other    • Dementia Mother    • Hypertension Mother    • Atrial fibrillation Mother    • Coronary artery disease Father    • COPD Sister    • Arthritis Sister    • Arthritis Brother    • Hypertension Brother    • Diverticulitis Brother        REVIEW OF SYSTEMS:   CONST:  No weight loss, fever, chills, +weakness or + fatigue.   HEENT:  No visual loss, blurred vision, double vision, yellow sclerae.                   No hearing loss, congestion, sore throat.   SKIN:      No rashes, urticaria, ulcers, sores.     RESP:     + shortness of breath, hemoptysis, cough, sputum.   GI:           No anorexia, nausea, vomiting, diarrhea. No abdominal pain, melena.   :         No burning on urination, hematuria or increased frequency.  ENDO:    No diaphoresis, cold or heat intolerance. No polyuria or polydipsia.   NEURO:  No headache, dizziness, syncope, paralysis, ataxia, or parasthesias.                  No change in bowel or bladder control. No history of CVA/TIA  MUSC:    No muscle, back pain, joint pain or stiffness.   HEME:    No anemia, bleeding, bruising. No history of DVT/PE.  PSYCH:  No history of depression, anxiety    Vitals:    11/10/21 1101   BP: 138/86   BP Location: Right arm   Patient Position: Sitting   Cuff Size: Adult   Pulse: 85   SpO2: 97%   Weight: 119 kg (263 lb)   Height: 177.8 cm (70\") "               Physical Exam:  GEN: Well nourished, well-developed, no acute distress- overweight   HEENT: Normocephalic, atraumatic, PERRLA, moist mucous membranes  NECK: Supple, NO JVD, no thyromegaly, no lymphadenopathy  CARD: irregular, bradycardic  no murmur, gallop, rub  LUNGS: Clear to auscultation, normal respiratory effort  ABDOMEN: Soft, nontender, normal bowel sounds  EXTREMITIES: No gross deformities, no clubbing, cyanosis, or edema  SKIN: Warm, dry  NEURO: No focal deficits, alert and oriented x 3  PSYCHIATRIC: Normal affect and mood      I personally viewed and interpreted the patient's EKG/Telemetry/lab data    Lab Results   Component Value Date    GLUCOSE 132 (H) 09/08/2021    CALCIUM 9.4 09/08/2021     09/08/2021    K 3.6 09/08/2021    CO2 24 09/08/2021     (H) 09/08/2021    BUN 17 09/08/2021    CREATININE 1.10 09/08/2021    EGFRIFAFRI 77 09/08/2021    EGFRIFNONA 67 09/08/2021    BCR 15 09/08/2021    ANIONGAP 10.0 08/06/2021     Lab Results   Component Value Date    WBC 11.08 (H) 08/06/2021    HGB 16.1 08/06/2021    HCT 48.1 08/06/2021    MCV 94.1 08/06/2021     08/06/2021     Lab Results   Component Value Date    INR 1.1 04/19/2019    INR 1.2 04/18/2019    INR 1.1 04/16/2019    PROTIME 11.4 04/19/2019    PROTIME 12.2 04/18/2019    PROTIME 11.6 04/16/2019     Lab Results   Component Value Date    TSH 2.816 01/22/2019     Pacemaker Interrogation: MDT Leadless pacemaker. VVIR 70bpm  72% Normal function battery voltage 8 years.       Procedures    ICD-10-CM ICD-9-CM   1. Atrial fibrillation with slow ventricular response (HCC)  I48.91 427.31   2. Pulmonary hypertension (HCC)  I27.20 416.8   3. Acute on chronic diastolic heart failure (HCC)  I50.33 428.33     Assessment and Plan:   1) Permanent atrial fibrillation with bradycardic response symptomatic non-reversible: VVI Leadless pacemaker-Normal function.   2)HTN: Controlled on Losartan, Diuretics   3)Anticoagulaiton: Eliquis 5mg  BID.   4)VHD, Moderate MR, +PFO,Normal EF 52% by Echo 2/18/2020  -

## 2021-12-08 DIAGNOSIS — I48.0 PAROXYSMAL ATRIAL FIBRILLATION (HCC): ICD-10-CM

## 2021-12-08 DIAGNOSIS — I27.20 PULMONARY HYPERTENSION (HCC): ICD-10-CM

## 2021-12-08 RX ORDER — EPLERENONE 25 MG/1
25 TABLET, FILM COATED ORAL DAILY
Qty: 30 TABLET | Refills: 11 | Status: SHIPPED | OUTPATIENT
Start: 2021-12-08 | End: 2022-12-13

## 2021-12-15 ENCOUNTER — TELEPHONE (OUTPATIENT)
Dept: CARDIOLOGY | Facility: CLINIC | Age: 71
End: 2021-12-15

## 2021-12-15 NOTE — TELEPHONE ENCOUNTER
Called patient and left a message letting him know that his pacemaker reading is due today. Left my name and number stating that if he needed help sending in the reading or if he has any concerns to call me.

## 2022-02-26 DIAGNOSIS — J44.9 CHRONIC OBSTRUCTIVE PULMONARY DISEASE, UNSPECIFIED COPD TYPE: ICD-10-CM

## 2022-03-14 ENCOUNTER — OFFICE VISIT (OUTPATIENT)
Dept: CARDIOLOGY | Facility: CLINIC | Age: 72
End: 2022-03-14

## 2022-03-14 VITALS
HEART RATE: 78 BPM | BODY MASS INDEX: 37.22 KG/M2 | SYSTOLIC BLOOD PRESSURE: 128 MMHG | DIASTOLIC BLOOD PRESSURE: 70 MMHG | OXYGEN SATURATION: 98 % | HEIGHT: 70 IN | WEIGHT: 260 LBS

## 2022-03-14 DIAGNOSIS — I48.20 CHRONIC ATRIAL FIBRILLATION: Primary | ICD-10-CM

## 2022-03-14 DIAGNOSIS — I10 PRIMARY HYPERTENSION: ICD-10-CM

## 2022-03-14 DIAGNOSIS — I25.10 CORONARY ARTERY DISEASE INVOLVING NATIVE CORONARY ARTERY OF NATIVE HEART WITHOUT ANGINA PECTORIS: ICD-10-CM

## 2022-03-14 DIAGNOSIS — E78.2 MIXED HYPERLIPIDEMIA: ICD-10-CM

## 2022-03-14 PROBLEM — R16.1 SPLENOMEGALY: Status: ACTIVE | Noted: 2019-04-16

## 2022-03-14 PROBLEM — E44.0 MALNUTRITION OF MODERATE DEGREE (HCC): Status: ACTIVE | Noted: 2019-04-17

## 2022-03-14 PROBLEM — Q21.12 PFO (PATENT FORAMEN OVALE): Status: ACTIVE | Noted: 2019-04-18

## 2022-03-14 PROCEDURE — 99214 OFFICE O/P EST MOD 30 MIN: CPT | Performed by: INTERNAL MEDICINE

## 2022-03-16 ENCOUNTER — TELEPHONE (OUTPATIENT)
Dept: CARDIOLOGY | Facility: CLINIC | Age: 72
End: 2022-03-16

## 2022-03-26 PROCEDURE — 93294 REM INTERROG EVL PM/LDLS PM: CPT | Performed by: INTERNAL MEDICINE

## 2022-03-26 PROCEDURE — 93296 REM INTERROG EVL PM/IDS: CPT | Performed by: INTERNAL MEDICINE

## 2022-05-09 RX ORDER — FUROSEMIDE 40 MG/1
40 TABLET ORAL DAILY
Qty: 90 TABLET | Refills: 3 | Status: SHIPPED | OUTPATIENT
Start: 2022-05-09 | End: 2023-03-01 | Stop reason: SDUPTHER

## 2022-10-04 ENCOUNTER — TELEPHONE (OUTPATIENT)
Dept: CARDIOLOGY | Facility: CLINIC | Age: 72
End: 2022-10-04

## 2022-10-04 DIAGNOSIS — I50.33 ACUTE ON CHRONIC DIASTOLIC HEART FAILURE: ICD-10-CM

## 2022-10-04 DIAGNOSIS — R06.09 DOE (DYSPNEA ON EXERTION): Primary | ICD-10-CM

## 2022-10-04 RX ORDER — POTASSIUM CHLORIDE 750 MG/1
10 CAPSULE, EXTENDED RELEASE ORAL 2 TIMES DAILY
COMMUNITY
End: 2022-10-04

## 2022-10-04 NOTE — TELEPHONE ENCOUNTER
Patient's wife called and stated that pt was started on Entresto 24-26 mg BID by Davina Saunders NP at PCP office and sent to [facility] in July 2022 due to CHF exacerbation. [facility] started pt on eplerenone.    Pt's wife states that pt has been on nocturnal O2 since discharge from [facility] in July, however patient became SOB last night, SpO2 dropped to 70's, so they put patient's O2 on at 4 LPM.  Patient has improved this morning, but is wearing O2 at 2 LPM. Pt ran out of Entresto a couple weeks ago, and did not resume Entresto until this morning.     Pt's wife is concerned about pt being on Entresto 24-26 mg BID, furosemide 40 mg daily and eplerenone 25 mg daily together. Advised pt's wife these medications are ok to take together. There was question and confusion on patient taking potassium. Pt's wife believes he took one dose of 20 mEq potassium last night, but has not been taking this every day otherwise. Advised pt's wife to have patient hold potassium.     We have no records.      Would you like to check BMP in the next week? Any additional labs?      Please advise.

## 2022-10-05 NOTE — TELEPHONE ENCOUNTER
Pt has a [employer]tronic Micra pacemaker which only provides RV pacing and no sensor to detect atrial activity. Micra devices also do not provide any data regarding heart failure. Most recent transmission was 9/14/2022-report in [facility] but hasn't been reviewed yet by [facility]. Pt has history of permanent Afib.     I called and left a voice mail message for pt to call device clinic for assistance on manual transmission- (Micra devices are not wireless due to tiny size & transmissions must be manually activated by the patient).

## 2022-10-05 NOTE — TELEPHONE ENCOUNTER
Patient called back. He states that he went to hospital and had lab work and CXR 7/12/22. Pt was not admitted. PCP increased furosemide to 80 mg daily for three days and then pt was to resume furosemide 40 mg daily. PCP stopped losartan and started Entresto 24-26 mg BID at that time, as well. Patient did not have any testing other than labs and CXR.    Pt has also been treated for pseudomonas cellulitis in left leg, treatment now complete.    BP running around 130s/70s HR 70s. Pt reports slight SOB and feels like he has fluid around his chest. Pt had some swelling last night, that has improved today.  Current SpO2 88-91% without wearing O2.     Called [facility] and requested labs and CXR. Will give to [facility] when they arrive.     Please advise of any further recommendations at this time.

## 2022-10-05 NOTE — TELEPHONE ENCOUNTER
Edward Stephens MD [place], Radha Treviño, RN  Caller: Unspecified (Yesterday,  1:41 PM)  Need [facility] records   Echo labs consult etc   _________________________________    Records requested.

## 2022-10-05 NOTE — TELEPHONE ENCOUNTER
Need to check PPM. Last remote reading in chart 6/2022 prior to CHF exacerbation. He has been on Entresto and Eplerenone in the past - Eplerenone was active medication in March at last visit. Last appt with [facility] cancelled.   Office notes requested from Dr. Baird. Will review with BIJU on 10/6/2022  Briefly reviewed with BIJU - will work in for FU with echo/MDT check.    10/10/2022  Appt given to wife, 10/17/2022 @ 10 AM [place] office, echo ordered will try to schedule same day. Wife will make sure he brings updated medication list.  Wife states pt has not been needed O2 during the day since starting Entresto ordered by PCP

## 2022-10-05 NOTE — TELEPHONE ENCOUNTER
[facility] has no record of patient being there in July 2022. Called pt, no answer, LVM at home number. Called alternate number which has call block on, so was unable to get through.

## 2022-10-17 ENCOUNTER — HOSPITAL ENCOUNTER (OUTPATIENT)
Dept: CARDIOLOGY | Facility: HOSPITAL | Age: 72
Discharge: HOME | End: 2022-10-17
Admitting: INTERNAL MEDICINE

## 2022-10-17 ENCOUNTER — OFFICE VISIT (OUTPATIENT)
Dept: CARDIOLOGY | Facility: CLINIC | Age: 72
End: 2022-10-17

## 2022-10-17 VITALS — BODY MASS INDEX: 37.22 KG/M2 | WEIGHT: 260 LBS | HEIGHT: 70 IN

## 2022-10-17 VITALS
WEIGHT: 263.4 LBS | SYSTOLIC BLOOD PRESSURE: 144 MMHG | OXYGEN SATURATION: 90 % | HEIGHT: 71 IN | BODY MASS INDEX: 36.88 KG/M2 | HEART RATE: 80 BPM | DIASTOLIC BLOOD PRESSURE: 88 MMHG

## 2022-10-17 DIAGNOSIS — R06.09 DOE (DYSPNEA ON EXERTION): ICD-10-CM

## 2022-10-17 DIAGNOSIS — I25.10 CORONARY ARTERY DISEASE INVOLVING NATIVE CORONARY ARTERY OF NATIVE HEART WITHOUT ANGINA PECTORIS: ICD-10-CM

## 2022-10-17 DIAGNOSIS — I50.22 CHRONIC SYSTOLIC CONGESTIVE HEART FAILURE: ICD-10-CM

## 2022-10-17 DIAGNOSIS — I48.20 CHRONIC ATRIAL FIBRILLATION: Primary | ICD-10-CM

## 2022-10-17 DIAGNOSIS — E78.2 MIXED HYPERLIPIDEMIA: ICD-10-CM

## 2022-10-17 DIAGNOSIS — I10 PRIMARY HYPERTENSION: ICD-10-CM

## 2022-10-17 DIAGNOSIS — I50.33 ACUTE ON CHRONIC DIASTOLIC HEART FAILURE: ICD-10-CM

## 2022-10-17 LAB
ASCENDING AORTA: 3.5 CM
BH CV ECHO MEAS - AO MAX PG: 7.3 MMHG
BH CV ECHO MEAS - AO MEAN PG: 4 MMHG
BH CV ECHO MEAS - AO ROOT DIAM: 3.7 CM
BH CV ECHO MEAS - AO V2 MAX: 135 CM/SEC
BH CV ECHO MEAS - AO V2 VTI: 30.1 CM
BH CV ECHO MEAS - AVA(I,D): 1.99 CM2
BH CV ECHO MEAS - EDV(CUBED): 219.3 ML
BH CV ECHO MEAS - EDV(MOD-SP2): 212 ML
BH CV ECHO MEAS - EDV(MOD-SP4): 229 ML
BH CV ECHO MEAS - EF(MOD-BP): 53 %
BH CV ECHO MEAS - EF(MOD-SP2): 52.8 %
BH CV ECHO MEAS - EF(MOD-SP4): 51.1 %
BH CV ECHO MEAS - ESV(CUBED): 75.7 ML
BH CV ECHO MEAS - ESV(MOD-SP2): 100 ML
BH CV ECHO MEAS - ESV(MOD-SP4): 112 ML
BH CV ECHO MEAS - FS: 29.9 %
BH CV ECHO MEAS - IVS/LVPW: 0.8 CM
BH CV ECHO MEAS - IVSD: 0.75 CM
BH CV ECHO MEAS - LA DIMENSION: 6.4 CM
BH CV ECHO MEAS - LAT PEAK E' VEL: 10.8 CM/SEC
BH CV ECHO MEAS - LV DIASTOLIC VOL/BSA (35-75): 98.1 CM2
BH CV ECHO MEAS - LV MASS(C)D: 200.8 GRAMS
BH CV ECHO MEAS - LV MAX PG: 3.4 MMHG
BH CV ECHO MEAS - LV MEAN PG: 2 MMHG
BH CV ECHO MEAS - LV SYSTOLIC VOL/BSA (12-30): 48 CM2
BH CV ECHO MEAS - LV V1 MAX: 91.7 CM/SEC
BH CV ECHO MEAS - LV V1 VTI: 19.1 CM
BH CV ECHO MEAS - LVIDD: 6 CM
BH CV ECHO MEAS - LVIDS: 4.2 CM
BH CV ECHO MEAS - LVOT AREA: 3.1 CM2
BH CV ECHO MEAS - LVOT DIAM: 2 CM
BH CV ECHO MEAS - LVPWD: 0.94 CM
BH CV ECHO MEAS - MED PEAK E' VEL: 6.68 CM/SEC
BH CV ECHO MEAS - MR MAX PG: 105.3 MMHG
BH CV ECHO MEAS - MR MAX VEL: 513 CM/SEC
BH CV ECHO MEAS - MR MEAN PG: 67 MMHG
BH CV ECHO MEAS - MR MEAN VEL: 385 CM/SEC
BH CV ECHO MEAS - MR VTI: 164 CM
BH CV ECHO MEAS - MV DEC SLOPE: 636 CM/SEC2
BH CV ECHO MEAS - MV DEC TIME: 0.23 MSEC
BH CV ECHO MEAS - MV E MAX VEL: 136 CM/SEC
BH CV ECHO MEAS - MV MAX PG: 10.2 MMHG
BH CV ECHO MEAS - MV MEAN PG: 4 MMHG
BH CV ECHO MEAS - MV P1/2T: 72.8 MSEC
BH CV ECHO MEAS - MV V2 VTI: 38.1 CM
BH CV ECHO MEAS - MVA(P1/2T): 3 CM2
BH CV ECHO MEAS - MVA(VTI): 1.57 CM2
BH CV ECHO MEAS - PA ACC SLOPE: 777 CM/SEC2
BH CV ECHO MEAS - PA ACC TIME: 0.14 SEC
BH CV ECHO MEAS - PA PR(ACCEL): 17.3 MMHG
BH CV ECHO MEAS - PA V2 MAX: 97.7 CM/SEC
BH CV ECHO MEAS - RAP SYSTOLE: 8 MMHG
BH CV ECHO MEAS - RVSP: 38 MMHG
BH CV ECHO MEAS - SI(MOD-SP2): 48 ML/M2
BH CV ECHO MEAS - SI(MOD-SP4): 50.1 ML/M2
BH CV ECHO MEAS - SV(LVOT): 60 ML
BH CV ECHO MEAS - SV(MOD-SP2): 112 ML
BH CV ECHO MEAS - SV(MOD-SP4): 117 ML
BH CV ECHO MEAS - TAPSE (>1.6): 1.3 CM
BH CV ECHO MEAS - TR MAX PG: 30 MMHG
BH CV ECHO MEAS - TR MAX VEL: 274 CM/SEC
BH CV ECHO MEASUREMENTS AVERAGE E/E' RATIO: 15.56
BH CV VAS BP LEFT ARM: NORMAL MMHG
BH CV XLRA - RV BASE: 4.8 CM
BH CV XLRA - RV LENGTH: 7.1 CM
BH CV XLRA - RV MID: 3.4 CM
BH CV XLRA - TDI S': 11.7 CM/SEC
LEFT ATRIUM VOLUME INDEX: 62.2 ML/M2
LV EF 2D ECHO EST: 50 %
MAXIMAL PREDICTED HEART RATE: 147 BPM
STRESS TARGET HR: 125 BPM

## 2022-10-17 PROCEDURE — 93306 TTE W/DOPPLER COMPLETE: CPT

## 2022-10-17 PROCEDURE — 99214 OFFICE O/P EST MOD 30 MIN: CPT | Performed by: INTERNAL MEDICINE

## 2022-10-17 PROCEDURE — 93306 TTE W/DOPPLER COMPLETE: CPT | Performed by: INTERNAL MEDICINE

## 2022-10-17 RX ORDER — CLINDAMYCIN HYDROCHLORIDE 300 MG/1
CAPSULE ORAL 2 TIMES DAILY
COMMUNITY
Start: 2022-10-07 | End: 2023-03-01

## 2022-10-17 NOTE — PROGRESS NOTES
[facility] Cardiology  Office Progress Note  Smith Craven  1950  [address] [facility] [place] [address]       Visit Date: 10/17/22    PCP: Jeane Baird MD  [address] [facility]   [place] [address]    IDENTIFICATION: A 71 y.o. male retired over the road   Former [facility] patient    PROBLEM LIST:   1. Permanent AF with slow ventricular response  1. CHADsVasc=3- on Eliquis  2. Noted to have AF with slow ventricular response during f/u with Dr. Napier, 5/27/21.   3. MCOT, 5/27/21, 15 day, 5/27/21-6/17/21, Max  bpm, Min HR 26 bpm, Avg HR 70 bpm. AF burden 98%,  HR to 30 bpm not associated with sleep, Pauses >2 seconds occurred 21 times with longest pause 3.8 seconds. PAC burden <1%, PVC burden 1%.   (off  Metoprolol during the monitor)   2. Cardiac disease, multifactorial:              A. Non-obstructive CAD by cath, January 2008.              B. Atrial fibrillation. CV = 2, on Eliquis  C. TTE, 12/15/18: biatrial enlargement, normal LVEF, mild MR, trivial pericardial effusion, mild PA hypertension  D. RHC 1/11/19: Severe biventricular elevation in diastolic pressures and severe PAH (pre-splenectomy)  E. LAUREN 1/11/19: EF 55%, mild-mod MR, LA and RA are dilated, saline test results positive with valsalva for PFO               F. Echo, 2/18/2020: biatrial enlargement, EF 52% normal RVSP              g. Atrial fibrillation with slow VR, summer 2021              A. Leadless PPM (MDT), 8/9/2021 ([place]).  2.    Obesity.  3.    Neurocardiogenic syncope.  4.    Cholelithiasis status post cholecystectomy, summer 2016:                          A. Procedure complicated by pneumonitis and need for blood transfusions.   5.    COPD and ANN MARIE, CPAP (stopped use in 2019)                          A. Followed by Yossi Blake MD.  6.    Chronic lower extremity venous insufficiency.  7.    Chronic leukopenia and anemia - resolved.              A. Hypersplenism              B.  Massive splenectomy,  "4/2019 ([facility])- hereditary elliptocytosis  8.    Gilbert's syndrome (doubt, see # 7B above).   9.    \"Fatty liver with no cirrhosis or amyloid\" diagnosed by liver biopsy, Summer 2018    10.  Hospitalization, 12/2018: shortness of breath                          A.  Bilateral pleural effusions/pericardial effusion noted.                          B.  HYS=126, negative troponins                          C.  \"MASSIVE\" splenomegaly with no important ascites                                    D.   Recent O2 for desaturation.             11.   Splenectomy, laparoscopic: hereditary elliptocytosis, 4/18/2019             12.    COVID-19 positive with outpatient BAM tx, 2/2021.      CC:   Chief Complaint   Patient presents with   • Chronic atrial fibrillation        Allergies  No Known Allergies    Current Medications    Current Outpatient Medications:   •  acetaminophen (TYLENOL) 500 MG tablet, Take 500 mg by mouth Every 6 (Six) Hours As Needed for Mild Pain ., Disp: , Rfl:   •  apixaban (ELIQUIS) 5 MG tablet tablet, Take 1 tablet by mouth Every 12 (Twelve) Hours. First dose Tuesday evening 8/10/21, Disp: 180 tablet, Rfl: 3  •  Ascorbic Acid (VITAMIN C PO), Take 500 mg by mouth Daily., Disp: , Rfl:   •  Cholecalciferol (Vitamin D3) 50 MCG (2000 UT) capsule, Take 2,000 Units by mouth Daily., Disp: , Rfl:   •  clindamycin (CLEOCIN) 300 MG capsule, 2 (Two) Times a Day., Disp: , Rfl:   •  eplerenone (INSPRA) 25 MG tablet, Take 1 tablet by mouth Daily., Disp: 30 tablet, Rfl: 11  •  furosemide (LASIX) 40 MG tablet, Take 1 tablet by mouth Daily., Disp: 90 tablet, Rfl: 3  •  ibuprofen (ADVIL,MOTRIN) 800 MG tablet, Take 800 mg by mouth Every 8 (Eight) Hours As Needed for Mild Pain ., Disp: , Rfl:   •  sacubitril-valsartan (ENTRESTO) 24-26 MG tablet, Take 1 tablet by mouth 2 (Two) Times a Day., Disp: , Rfl:   •  vitamin B-12 (CYANOCOBALAMIN) 1000 MCG tablet, Take 500 mcg by mouth Daily., Disp: , Rfl:       History of Present Illness " "  Smith Craven is a 71 y.o. year old male here for follow up.  Crescendo shortness of breath requiring diuretic therapy most recently.  He is initiated Entresto most recently with improvement of his symptoms.  He attest to poor dietary compliance and increased utilization of NSAIDs.      OBJECTIVE:  Vitals:    10/17/22 1023   BP: 144/88   BP Location: Right arm   Patient Position: Sitting   Pulse: 80   SpO2: 90%   Weight: 119 kg (263 lb 6.4 oz)   Height: 180.3 cm (71\")     Body mass index is 36.74 kg/m².    Constitutional:       Appearance: Healthy appearance. Not in distress.   Neck:      Vascular: No JVR. JVD normal.   Pulmonary:      Effort: Pulmonary effort is normal.      Breath sounds: Normal breath sounds. No wheezing. No rhonchi. No rales.   Chest:      Chest wall: Not tender to palpatation.   Cardiovascular:      PMI at left midclavicular line. Normal rate. Regular rhythm. Normal S1. Normal S2.      Murmurs: There is no murmur.      No gallop. No click. No rub.   Pulses:     Intact distal pulses.   Edema:     Peripheral edema absent.   Abdominal:      General: Bowel sounds are normal.      Palpations: Abdomen is soft.      Tenderness: There is no abdominal tenderness.   Musculoskeletal: Normal range of motion.         General: No tenderness. Skin:     General: Skin is warm and dry.   Neurological:      General: No focal deficit present.      Mental Status: Alert and oriented to person, place and time.         Diagnostic Data:  Procedures  Device check  Acceptable thresholds impedances  74% V pacing  Generator 8-year    ASSESSMENT:   Diagnosis Plan   1. Chronic atrial fibrillation (HCC)        2. Coronary artery disease involving native coronary artery of native heart without angina pectoris        3. Mixed hyperlipidemia        4. Primary hypertension            PLAN:  Chronic A. fib post leadless pacemaker ,anticoagulated    CAD post remote catheterization no obstructive symptoms continued " observation    Mixed dyslipidemia controlled on statin therapy    Hypertension controlled eplerenone Entresto combination    CHF chronic with moderate plus mitral regurgitation liberalize diuretic for weight gain and reinstructed to avoid sodium loads and avoidance of NSAIDs        Edward Stephens MD, FACC

## 2022-12-12 DIAGNOSIS — I27.20 PULMONARY HYPERTENSION: ICD-10-CM

## 2022-12-12 DIAGNOSIS — I48.0 PAROXYSMAL ATRIAL FIBRILLATION: ICD-10-CM

## 2022-12-13 RX ORDER — EPLERENONE 25 MG/1
TABLET, FILM COATED ORAL
Qty: 30 TABLET | Refills: 11 | Status: SHIPPED | OUTPATIENT
Start: 2022-12-13 | End: 2023-03-01 | Stop reason: SDUPTHER

## 2022-12-14 ENCOUNTER — TELEPHONE (OUTPATIENT)
Dept: CARDIOLOGY | Facility: CLINIC | Age: 72
End: 2022-12-14

## 2023-03-01 ENCOUNTER — OFFICE VISIT (OUTPATIENT)
Dept: CARDIOLOGY | Facility: CLINIC | Age: 73
End: 2023-03-01
Payer: MEDICARE

## 2023-03-01 VITALS
OXYGEN SATURATION: 95 % | HEIGHT: 71 IN | HEART RATE: 76 BPM | WEIGHT: 252.4 LBS | BODY MASS INDEX: 35.34 KG/M2 | SYSTOLIC BLOOD PRESSURE: 134 MMHG | DIASTOLIC BLOOD PRESSURE: 70 MMHG

## 2023-03-01 DIAGNOSIS — R06.09 DOE (DYSPNEA ON EXERTION): Primary | ICD-10-CM

## 2023-03-01 DIAGNOSIS — I48.0 PAROXYSMAL ATRIAL FIBRILLATION: ICD-10-CM

## 2023-03-01 DIAGNOSIS — E78.2 MIXED HYPERLIPIDEMIA: ICD-10-CM

## 2023-03-01 DIAGNOSIS — I34.0 NONRHEUMATIC MITRAL VALVE REGURGITATION: ICD-10-CM

## 2023-03-01 DIAGNOSIS — I10 PRIMARY HYPERTENSION: ICD-10-CM

## 2023-03-01 DIAGNOSIS — I48.20 CHRONIC ATRIAL FIBRILLATION: ICD-10-CM

## 2023-03-01 DIAGNOSIS — I27.20 PULMONARY HYPERTENSION: ICD-10-CM

## 2023-03-01 PROCEDURE — 99214 OFFICE O/P EST MOD 30 MIN: CPT | Performed by: INTERNAL MEDICINE

## 2023-03-01 RX ORDER — FUROSEMIDE 40 MG/1
40 TABLET ORAL DAILY
Qty: 90 TABLET | Refills: 3 | Status: SHIPPED | OUTPATIENT
Start: 2023-03-01

## 2023-03-01 RX ORDER — EPLERENONE 25 MG/1
25 TABLET, FILM COATED ORAL DAILY
Qty: 30 TABLET | Refills: 11 | Status: SHIPPED | OUTPATIENT
Start: 2023-03-01

## 2023-03-01 NOTE — PROGRESS NOTES
[facility] Cardiology  Office Progress Note  Smith Craven  1950  [address] [facility] [place] [address]       Visit Date: 03/01/23    PCP: Jeane Baird MD  [address] [facility]   [place] [address]    IDENTIFICATION: A 72 y.o. male retired over the road   Former [facility] patient    PROBLEM LIST:   1. Permanent AF with slow ventricular response  1. CHADsVasc=3- on Eliquis  2. Noted to have AF with slow ventricular response during f/u with Dr. Napier, 5/27/21.   3. MCOT, 5/27/21, 15 day, 5/27/21-6/17/21, Max  bpm, Min HR 26 bpm, Avg HR 70 bpm. AF burden 98%,  HR to 30 bpm not associated with sleep, Pauses >2 seconds occurred 21 times with longest pause 3.8 seconds. PAC burden <1%, PVC burden 1%.   (off  Metoprolol during the monitor)   2. Cardiac disease, multifactorial:              A. 2008 Non-obstructive CAD by cath              B. Atrial fibrillation. CV = 2, on Eliquis  C. TTE, 12/15/18: biatrial enlargement, normal LVEF, mild MR, trivial pericardial effusion, mild PA hypertension  D. RHC 1/11/19: Severe biventricular elevation in diastolic pressures and severe PAH (pre-splenectomy)  E. LAUREN 1/11/19: EF 55%, mild-mod MR, LA and RA are dilated, saline test results positive with valsalva for PFO               F. 10/22 echo: biatrial enlargement, EF 50% mod MR normal RVSP              g. Atrial fibrillation with slow VR, summer 2021              A. Leadless PPM (MDT), 8/9/2021 ([place]).  2.    Obesity.  3.    Neurocardiogenic syncope.  4.    Cholelithiasis status post cholecystectomy, summer 2016:                          A. Procedure complicated by pneumonitis and need for blood transfusions.   5.    COPD and ANN MARIE, CPAP (stopped use in 2019)                          A. Followed by Yossi Blake MD.  6.    Chronic lower extremity venous insufficiency.  7.    Chronic leukopenia and anemia - resolved.              A. Hypersplenism              B.  Massive splenectomy, 4/2019  "([facility])- hereditary elliptocytosis  8.    Gilbert's syndrome (doubt, see # 7B above).   9.    \"Fatty liver with no cirrhosis or amyloid\" diagnosed by liver biopsy, Summer 2018    10.  Hospitalization, 12/2018: shortness of breath                          A.  Bilateral pleural effusions/pericardial effusion noted.                          B.  OEH=360, negative troponins                          C.  \"MASSIVE\" splenomegaly with no  ascites                                    D.   Recent O2 for desaturation.             11.   Splenectomy, laparoscopic: hereditary elliptocytosis, 4/18/2019             12.    COVID-19 positive with outpatient BAM tx, 2/2021.      CC:   Chief Complaint   Patient presents with   • Chronic atrial fibrillation        Allergies  No Known Allergies    Current Medications    Current Outpatient Medications:   •  acetaminophen (TYLENOL) 500 MG tablet, Take 1 tablet by mouth Every 6 (Six) Hours As Needed for Mild Pain., Disp: , Rfl:   •  apixaban (ELIQUIS) 5 MG tablet tablet, Take 1 tablet by mouth Every 12 (Twelve) Hours. First dose Tuesday evening 8/10/21, Disp: 180 tablet, Rfl: 3  •  eplerenone (INSPRA) 25 MG tablet, TAKE ONE TABLET BY MOUTH EVERY DAY, Disp: 30 tablet, Rfl: 11  •  furosemide (LASIX) 40 MG tablet, Take 1 tablet by mouth Daily., Disp: 90 tablet, Rfl: 3  •  ibuprofen (ADVIL,MOTRIN) 800 MG tablet, Take 1 tablet by mouth Every 8 (Eight) Hours As Needed for Mild Pain., Disp: , Rfl:   •  sacubitril-valsartan (ENTRESTO) 24-26 MG tablet, Take 1 tablet by mouth 2 (Two) Times a Day., Disp: , Rfl:   •  vitamin B-12 (CYANOCOBALAMIN) 1000 MCG tablet, Take 500 mcg by mouth Daily., Disp: , Rfl:       History of Present Illness   Smith Craven is a 72 y.o. year old male here for follow up.  Shortness of breath potentially worsened.  He did have flu this winter as several of his family members were ill as well.        OBJECTIVE:  Vitals:    03/01/23 1322   BP: 134/70   BP Location: Right arm " "  Patient Position: Sitting   Pulse: 76   SpO2: 95%   Weight: 114 kg (252 lb 6.4 oz)   Height: 180.3 cm (71\")     Body mass index is 35.2 kg/m².    Constitutional:       Appearance: Healthy appearance. Not in distress.   Neck:      Vascular: No JVR. JVD normal.   Pulmonary:      Effort: Pulmonary effort is normal.      Breath sounds: Normal breath sounds. No wheezing. No rhonchi. No rales.   Chest:      Chest wall: Not tender to palpatation.   Cardiovascular:      PMI at left midclavicular line. Normal rate. Regular rhythm. Normal S1. Normal S2.      Murmurs: There is a systolic murmur.      No gallop. No click. No rub.   Pulses:     Intact distal pulses.   Edema:     Peripheral edema present.  Abdominal:      General: Bowel sounds are normal.      Palpations: Abdomen is soft.      Tenderness: There is no abdominal tenderness.   Musculoskeletal: Normal range of motion.         General: No tenderness. Skin:     General: Skin is warm and dry.   Neurological:      General: No focal deficit present.      Mental Status: Alert and oriented to person, place and time.         Diagnostic Data:  Procedures      ASSESSMENT:   Diagnosis Plan   1. HARTMANN (dyspnea on exertion)  Adult Transthoracic Echo Complete W/ Cont if Necessary Per Protocol      2. Chronic atrial fibrillation (HCC)        3. Primary hypertension        4. Mixed hyperlipidemia        5. Nonrheumatic mitral valve regurgitation            PLAN:  Chronic A. fib post leadless pacemaker ,anticoagulated    CAD post remote catheterization no obstructive symptoms continued observation    Mixed dyslipidemia controlled on statin therapy    Hypertension controlled eplerenone Entresto combination    CHF chronic with moderate plus mitral regurgitation liberalize diuretic for weight gain and reinstructed to avoid sodium loads and avoidance of NSAIDs.  We will follow-up echocardiogram later this fall      Edward Stephens MD, FACC  "

## 2023-03-15 ENCOUNTER — TELEPHONE (OUTPATIENT)
Dept: CARDIOLOGY | Facility: CLINIC | Age: 73
End: 2023-03-15
Payer: MEDICARE

## 2023-05-08 ENCOUNTER — HOSPITAL ENCOUNTER (OUTPATIENT)
Dept: CARDIOLOGY | Facility: HOSPITAL | Age: 73
Discharge: HOME | End: 2023-05-08
Admitting: INTERNAL MEDICINE
Payer: MEDICARE

## 2023-05-08 VITALS — BODY MASS INDEX: 35.28 KG/M2 | WEIGHT: 252 LBS | HEIGHT: 71 IN

## 2023-05-08 DIAGNOSIS — R06.09 DOE (DYSPNEA ON EXERTION): ICD-10-CM

## 2023-05-08 LAB
BH CV ECHO MEAS - AO MAX PG: 6.9 MMHG
BH CV ECHO MEAS - AO MEAN PG: 4 MMHG
BH CV ECHO MEAS - AO ROOT AREA (BSA CORRECTED): 1.6 CM2
BH CV ECHO MEAS - AO ROOT DIAM: 3.8 CM
BH CV ECHO MEAS - AO V2 MAX: 131 CM/SEC
BH CV ECHO MEAS - AO V2 VTI: 26.8 CM
BH CV ECHO MEAS - AVA(I,D): 2.9 CM2
BH CV ECHO MEAS - EDV(CUBED): 236 ML
BH CV ECHO MEAS - EDV(MOD-SP2): 178 ML
BH CV ECHO MEAS - EDV(MOD-SP4): 162 ML
BH CV ECHO MEAS - EF(MOD-BP): 50 %
BH CV ECHO MEAS - EF(MOD-SP2): 52.8 %
BH CV ECHO MEAS - EF(MOD-SP4): 50 %
BH CV ECHO MEAS - ESV(CUBED): 91.1 ML
BH CV ECHO MEAS - ESV(MOD-SP2): 84 ML
BH CV ECHO MEAS - ESV(MOD-SP4): 81 ML
BH CV ECHO MEAS - FS: 27.2 %
BH CV ECHO MEAS - IVS/LVPW: 0.94 CM
BH CV ECHO MEAS - IVSD: 0.89 CM
BH CV ECHO MEAS - LA DIMENSION: 5.2 CM
BH CV ECHO MEAS - LV DIASTOLIC VOL/BSA (35-75): 69.6 CM2
BH CV ECHO MEAS - LV MASS(C)D: 233.4 GRAMS
BH CV ECHO MEAS - LV MAX PG: 6.4 MMHG
BH CV ECHO MEAS - LV MEAN PG: 3 MMHG
BH CV ECHO MEAS - LV SYSTOLIC VOL/BSA (12-30): 34.8 CM2
BH CV ECHO MEAS - LV V1 MAX: 126 CM/SEC
BH CV ECHO MEAS - LV V1 VTI: 25.1 CM
BH CV ECHO MEAS - LVIDD: 6.2 CM
BH CV ECHO MEAS - LVIDS: 4.5 CM
BH CV ECHO MEAS - LVOT AREA: 3.1 CM2
BH CV ECHO MEAS - LVOT DIAM: 2 CM
BH CV ECHO MEAS - LVPWD: 0.95 CM
BH CV ECHO MEAS - MR MAX PG: 102.2 MMHG
BH CV ECHO MEAS - MR MAX VEL: 505.5 CM/SEC
BH CV ECHO MEAS - MR MEAN PG: 64.5 MMHG
BH CV ECHO MEAS - MR MEAN VEL: 376.5 CM/SEC
BH CV ECHO MEAS - MR VTI: 161.5 CM
BH CV ECHO MEAS - MV A MAX VEL: 46.9 CM/SEC
BH CV ECHO MEAS - MV DEC SLOPE: 676 CM/SEC2
BH CV ECHO MEAS - MV DEC TIME: 0.19 MSEC
BH CV ECHO MEAS - MV E MAX VEL: 143 CM/SEC
BH CV ECHO MEAS - MV E/A: 3
BH CV ECHO MEAS - MV MAX PG: 10.6 MMHG
BH CV ECHO MEAS - MV MEAN PG: 4 MMHG
BH CV ECHO MEAS - MV P1/2T: 73.7 MSEC
BH CV ECHO MEAS - MV V2 VTI: 39.6 CM
BH CV ECHO MEAS - MVA(P1/2T): 3 CM2
BH CV ECHO MEAS - MVA(VTI): 1.99 CM2
BH CV ECHO MEAS - PA ACC SLOPE: 697 CM/SEC2
BH CV ECHO MEAS - PA ACC TIME: 0.11 SEC
BH CV ECHO MEAS - PA PR(ACCEL): 28.2 MMHG
BH CV ECHO MEAS - RAP SYSTOLE: 8 MMHG
BH CV ECHO MEAS - RVSP: 36 MMHG
BH CV ECHO MEAS - SI(MOD-SP2): 40.4 ML/M2
BH CV ECHO MEAS - SI(MOD-SP4): 34.8 ML/M2
BH CV ECHO MEAS - SV(LVOT): 78.9 ML
BH CV ECHO MEAS - SV(MOD-SP2): 94 ML
BH CV ECHO MEAS - SV(MOD-SP4): 81 ML
BH CV ECHO MEAS - TAPSE (>1.6): 1.9 CM
BH CV ECHO MEAS - TR MAX PG: 28.1 MMHG
BH CV ECHO MEAS - TR MAX VEL: 252.8 CM/SEC
BH CV VAS BP RIGHT ARM: NORMAL MMHG
BH CV XLRA - RV BASE: 5.7 CM
BH CV XLRA - RV LENGTH: 8.1 CM
BH CV XLRA - RV MID: 3.9 CM
BH CV XLRA - TDI S': 13.7 CM/SEC
LEFT ATRIUM VOLUME INDEX: 74.2 ML/M2
MAXIMAL PREDICTED HEART RATE: 146 BPM
STRESS TARGET HR: 124 BPM

## 2023-05-08 PROCEDURE — 93306 TTE W/DOPPLER COMPLETE: CPT | Performed by: INTERNAL MEDICINE

## 2023-05-08 PROCEDURE — 93306 TTE W/DOPPLER COMPLETE: CPT

## 2023-05-09 ENCOUNTER — TELEPHONE (OUTPATIENT)
Dept: CARDIOLOGY | Facility: CLINIC | Age: 73
End: 2023-05-09
Payer: MEDICARE

## 2023-05-10 ENCOUNTER — OFFICE VISIT (OUTPATIENT)
Dept: CARDIOLOGY | Facility: CLINIC | Age: 73
End: 2023-05-10
Payer: MEDICARE

## 2023-05-10 VITALS
BODY MASS INDEX: 36.31 KG/M2 | HEART RATE: 89 BPM | DIASTOLIC BLOOD PRESSURE: 80 MMHG | HEIGHT: 70 IN | OXYGEN SATURATION: 95 % | SYSTOLIC BLOOD PRESSURE: 124 MMHG | WEIGHT: 253.6 LBS

## 2023-05-10 DIAGNOSIS — I34.0 NONRHEUMATIC MITRAL VALVE REGURGITATION: ICD-10-CM

## 2023-05-10 DIAGNOSIS — I48.21 PERMANENT ATRIAL FIBRILLATION: Primary | ICD-10-CM

## 2023-05-10 DIAGNOSIS — R00.1 BRADYCARDIA: ICD-10-CM

## 2023-05-10 NOTE — PROGRESS NOTES
Smith Craven  1950  [phone]    05/10/2023    [facility] CARDIOLOGY MAIN [facility]     Referring Provider: No ref. provider found     Jeane Baird MD  [address] [place] [address]    Chief Complaint   Patient presents with   • Atrial fibrillation with slow ventricular response     PROBLEM LIST:   1. Permanent AF with slow ventricular response  1. CHADsVasc=3- on Eliquis  2. Noted to have AF with slow ventricular response during f/u with Dr. Napier, 5/27/21.   3. MCOT, 5/27/21, 15 day, 5/27/21-6/17/21, Max  bpm, Min HR 26 bpm, Avg HR 70 bpm. AF burden 98%,  HR to 30 bpm not associated with sleep, Pauses >2 seconds occurred 21 times with longest pause 3.8 seconds. PAC burden <1%, PVC burden 1%.   (off  Metoprolol during the monitor)   2. Cardiac disease, multifactorial:              A. 2008 Non-obstructive CAD by cath              B. Atrial fibrillation. CV = 2, on Eliquis  C. TTE, 12/15/18: biatrial enlargement, normal LVEF, mild MR, trivial pericardial effusion, mild PA hypertension  D. RHC 1/11/19: Severe biventricular elevation in diastolic pressures and severe PAH (pre-splenectomy)  E. LAUREN 1/11/19: EF 55%, mild-mod MR, LA and RA are dilated, saline test results positive with valsalva for PFO               F. 10/22 echo: biatrial enlargement, EF 50% mod MR normal RVSP              g. Atrial fibrillation with slow VR, summer 2021              A. Leadless PPM (MDT), 8/9/2021 ([place]).  H. Echocardiogram 5/8/2023: EF 46-50%, moderate MR, RVSP 35-45 mmHg  2.    Obesity.  3.    Neurocardiogenic syncope.  4.    Cholelithiasis status post cholecystectomy, summer 2016:                          A. Procedure complicated by pneumonitis and need for blood transfusions.   5.    COPD and ANN MARIE, CPAP (stopped use in 2019)                          A. Followed by Yossi Blake MD.  6.    Chronic lower extremity venous insufficiency.  7.    Chronic leukopenia and anemia -  "resolved.              A. Hypersplenism              B.  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    Gilbert's syndrome (doubt, see # 7B above).   9.    \"Fatty liver with no cirrhosis or amyloid\" diagnosed by liver biopsy, Summer 2018    10.  Hospitalization, 12/2018: shortness of breath                          A.  Bilateral pleural effusions/pericardial effusion noted.                          B.  QFB=679, negative troponins                          C.  \"MASSIVE\" splenomegaly with no  ascites                                    D.   Recent O2 for desaturation.             11.   Splenectomy, laparoscopic: hereditary elliptocytosis, 4/18/2019             12.    COVID-19 positive with outpatient BAM tx, 2/2021.      Allergies  No Known Allergies    Current Medications    Current Outpatient Medications:   •  acetaminophen (TYLENOL) 500 MG tablet, Take 1 tablet by mouth Every 6 (Six) Hours As Needed for Mild Pain., Disp: , Rfl:   •  apixaban (ELIQUIS) 5 MG tablet tablet, Take 1 tablet by mouth Every 12 (Twelve) Hours. First dose Tuesday evening 8/10/21, Disp: 180 tablet, Rfl: 3  •  eplerenone (INSPRA) 25 MG tablet, Take 1 tablet by mouth Daily., Disp: 30 tablet, Rfl: 11  •  furosemide (LASIX) 40 MG tablet, Take 1 tablet by mouth Daily., Disp: 90 tablet, Rfl: 3  •  ibuprofen (ADVIL,MOTRIN) 800 MG tablet, Take 1 tablet by mouth Every 8 (Eight) Hours As Needed for Mild Pain., Disp: , Rfl:   •  sacubitril-valsartan (ENTRESTO) 24-26 MG tablet, Take 1 tablet by mouth 2 (Two) Times a Day., Disp: 60 tablet, Rfl: 3  •  vitamin B-12 (CYANOCOBALAMIN) 1000 MCG tablet, Take 500 mcg by mouth Daily., Disp: , Rfl:     History of Present Illness     Pt presents for follow up of permanent atrial fibrillation, bradycardia with leadless PM, CHF. Since we last saw the pt, pt denies any AF episodes, SOB, CP, LH, and dizziness, syncope. Denies any hospitalizations, ER visits, bleeding, or TIA/CVA symptoms. Overall " "feels well. Had recent echo with Dr. Stephens which was stable.     ROS:  General:  Denies fatigue, weight gain or loss  Cardiovascular:  Denies CP, PND, syncope, near syncope, edema or palpitations.  Pulmonary:  Denies HARTMANN, cough, or wheezing      Vitals:    05/10/23 1345   BP: 124/80   BP Location: Left arm   Patient Position: Sitting   Pulse: 89   SpO2: 95%   Weight: 115 kg (253 lb 9.6 oz)   Height: 177.8 cm (70\")     Body mass index is 36.39 kg/m².  PE:  General: NAD  Neck: no JVD, no carotid bruits, no TM  Heart RRR, NL S1, S2, S4 present, no rubs, murmurs  Lungs: CTA, no wheezes, rhonchi, or rales  Abd: soft, non-tender, NL BS  Ext: No musculoskeletal deformities, no edema, cyanosis, or clubbing  Psych: normal mood and affect    Diagnostic Data:    Leadless PM Manual Interrogation: normal function. V paced 81%, >8 years on battery.     Procedures           1. Permanent atrial fibrillation    2. Nonrheumatic mitral valve regurgitation    3. Bradycardia          Plan:  1) Permanent atrial fibrillation with bradycardic response symptomatic non-reversible: VVI Leadless pacemaker-Normal function.   2)HTN: Controlled on Losartan, Diuretics   3)Anticoagulaiton: Eliquis 5mg BID.   4)VHD, Moderate MR, +PFO,Normal EF 52% by Echo 5/2023.       F/up in 12 months    Electronically signed by TRACE Thornton, 05/10/23, 2:06 PM EDT.    "

## 2023-05-25 ENCOUNTER — TELEPHONE (OUTPATIENT)
Dept: CARDIOLOGY | Facility: CLINIC | Age: 73
End: 2023-05-25
Payer: MEDICARE

## 2023-05-25 NOTE — TELEPHONE ENCOUNTER
Pt scheduled for EGD/colonoscopy on 6/13/2023 with Dr. Toney in [place]  Phone - [phone]  Fax - [phone]    Office requesting cardiac risk assessment and Eliquis hold instructions prior to procedure.    Last office visit 3/1/2023  On Eliquis for chronic AF - normal renal function 9/2022   Last Echo 5/8/2023 - not significantly changed from 2022, EF 45-50%, Moderate MR     Will review with [facility]  Per [facility] - acceptable risk, hold 48 hours prior  Letter sent in epic

## 2023-06-14 ENCOUNTER — TELEPHONE (OUTPATIENT)
Dept: CARDIOLOGY | Facility: CLINIC | Age: 73
End: 2023-06-14
Payer: MEDICARE

## 2023-09-13 ENCOUNTER — TELEPHONE (OUTPATIENT)
Dept: CARDIOLOGY | Facility: CLINIC | Age: 73
End: 2023-09-13
Payer: MEDICARE

## 2023-12-13 ENCOUNTER — TELEPHONE (OUTPATIENT)
Dept: CARDIOLOGY | Facility: CLINIC | Age: 73
End: 2023-12-13
Payer: MEDICARE

## 2023-12-13 NOTE — TELEPHONE ENCOUNTER
Spoke with wife. Reminded her that Mr. Craven's pacemaker reading is due today. She said he will send in the reading when he gets home.

## 2023-12-18 DIAGNOSIS — I27.20 PULMONARY HYPERTENSION: ICD-10-CM

## 2023-12-18 DIAGNOSIS — I48.0 PAROXYSMAL ATRIAL FIBRILLATION: ICD-10-CM

## 2023-12-18 RX ORDER — EPLERENONE 25 MG/1
25 TABLET, FILM COATED ORAL DAILY
Qty: 90 TABLET | Refills: 1 | Status: SHIPPED | OUTPATIENT
Start: 2023-12-18

## 2024-03-27 ENCOUNTER — TELEPHONE (OUTPATIENT)
Dept: CARDIOLOGY | Facility: CLINIC | Age: 74
End: 2024-03-27
Payer: MEDICARE

## 2024-05-15 ENCOUNTER — OFFICE VISIT (OUTPATIENT)
Dept: CARDIOLOGY | Facility: CLINIC | Age: 74
End: 2024-05-15
Payer: MEDICARE

## 2024-05-15 ENCOUNTER — PATIENT ROUNDING (BHMG ONLY) (OUTPATIENT)
Dept: CARDIOLOGY | Facility: CLINIC | Age: 74
End: 2024-05-15
Payer: MEDICARE

## 2024-05-15 VITALS
HEIGHT: 71 IN | HEART RATE: 75 BPM | WEIGHT: 244.6 LBS | DIASTOLIC BLOOD PRESSURE: 72 MMHG | BODY MASS INDEX: 34.24 KG/M2 | SYSTOLIC BLOOD PRESSURE: 126 MMHG | OXYGEN SATURATION: 96 %

## 2024-05-15 DIAGNOSIS — E78.2 MIXED HYPERLIPIDEMIA: ICD-10-CM

## 2024-05-15 DIAGNOSIS — I10 PRIMARY HYPERTENSION: ICD-10-CM

## 2024-05-15 DIAGNOSIS — I48.20 CHRONIC ATRIAL FIBRILLATION: ICD-10-CM

## 2024-05-15 DIAGNOSIS — I34.0 NONRHEUMATIC MITRAL VALVE REGURGITATION: Primary | ICD-10-CM

## 2024-05-15 PROCEDURE — 99214 OFFICE O/P EST MOD 30 MIN: CPT | Performed by: INTERNAL MEDICINE

## 2024-05-15 NOTE — PROGRESS NOTES
"[facility] Cardiology  Office Progress Note  Smith Craven  1950  [address] [place] [address]       Visit Date: 05/15/24    PCP: Jeane Baird MD  [address]   [place] [address]    IDENTIFICATION: A 73 y.o. male retired [place] the road   Former [facility] patient    PROBLEM LIST:   Permanent AF with slow ventricular response  CHADsVasc=3- on Eliquis  Noted to have AF with slow ventricular response during f/u with Dr. Napier, 5/27/21.   MCOT, 5/27/21, 15 day, 5/27/21-6/17/21, Max  bpm, Min HR 26 bpm, Avg HR 70 bpm. AF burden 98%,  HR to 30 bpm not associated with sleep, Pauses >2 seconds occurred 21 times with longest pause 3.8 seconds. PAC burden <1%, PVC burden 1%.   (off  Metoprolol during the monitor)   Atrial fibrillation with slow VR, summer 2021              A. Leadless PPM (MDT), 8/9/2021 ([facility]).  Cardiac disease, multifactorial:              A. 2008 Non-obstructive CAD by cath              B. RHC 1/11/19: Severe biventricular elevation in diastolic pressures and severe PAH (pre-splenectomy)  C. LAUREN 1/11/19: EF 55%, mild-mod MR, LA and RA are dilated, saline test results positive with valsalva for PFO               D. 10/22 echo: biatrial enlargement, EF 50% mod MR normal   RVSP               E. 5/23 echo EF >46% at least mod MR rvsp 40    3.    Neurocardiogenic syncope.  4.    Cholelithiasis status post cholecystectomy, summer 2016:                          A. Procedure complicated by pneumonitis and need for blood transfusions.   5.    COPD and ANN MARIE, CPAP (stopped use in 2019)                          A. Followed by Yossi Blake MD.  6.    Chronic lower extremity venous insufficiency.  7.    Chronic leukopenia and anemia - resolved.              A. Hypersplenism              B.  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    Gilbert's syndrome (doubt, see # 7B above).   9.    \"Fatty liver with no cirrhosis or amyloid\" diagnosed " "by liver biopsy, Summer 2018    10.  Hospitalization, 12/2018: shortness of breath                          A.  Bilateral pleural effusions/pericardial effusion noted.                          B.  WEM=442, negative troponins                          C.  \"MASSIVE\" splenomegaly with no  ascites                                    D.   Recent O2 for desaturation.             11.   Splenectomy, laparoscopic: hereditary elliptocytosis, 4/18/2019             12.    COVID-19 positive with outpatient BAM tx, 2/2021.      CC:   Chief Complaint   Patient presents with    Atrial Fibrillation    HARTMANN (dyspnea on exertion)       Allergies  No Known Allergies    Current Medications    Current Outpatient Medications:     apixaban (ELIQUIS) 5 MG tablet tablet, Take 1 tablet by mouth Every 12 (Twelve) Hours. First dose Tuesday evening 8/10/21, Disp: 180 tablet, Rfl: 3    eplerenone (INSPRA) 25 MG tablet, Take 1 tablet by mouth Daily., Disp: 90 tablet, Rfl: 1    furosemide (LASIX) 40 MG tablet, Take 1 tablet by mouth Daily., Disp: 90 tablet, Rfl: 3    sacubitril-valsartan (ENTRESTO) 24-26 MG tablet, Take 1 tablet by mouth 2 (Two) Times a Day., Disp: 60 tablet, Rfl: 3    vitamin B-12 (CYANOCOBALAMIN) 1000 MCG tablet, Take 0.5 tablets by mouth Daily., Disp: , Rfl:       History of Present Illness   Smith Craven is a 73 y.o. year old male here for follow up.  Status quo no worsening shortness of breath tolerant of his medications.  He is enjoying watching his 4-year-old grandchild doing [employer] ball        OBJECTIVE:  Vitals:    05/15/24 1312   BP: 126/72   BP Location: Right arm   Patient Position: Sitting   Cuff Size: Adult   Pulse: 75   SpO2: 96%   Weight: 111 kg (244 lb 9.6 oz)   Height: 180.3 cm (71\")       Body mass index is 34.11 kg/m².    Constitutional:       Appearance: Healthy appearance. Not in distress.   Neck:      Vascular: No JVR. JVD normal.   Pulmonary:      Effort: Pulmonary effort is normal.      Breath sounds: Normal " breath sounds. No wheezing. No rhonchi. No rales.   Chest:      Chest wall: Not tender to palpatation.   Cardiovascular:      PMI at left midclavicular line. Normal rate. Regular rhythm. Normal S1. Normal S2.       Murmurs: There is a systolic murmur.      No gallop.  No click. No rub.   Pulses:     Intact distal pulses.   Edema:     Peripheral edema present.  Abdominal:      General: Bowel sounds are normal.      Palpations: Abdomen is soft.      Tenderness: There is no abdominal tenderness.   Musculoskeletal: Normal range of motion.         General: No tenderness. Skin:     General: Skin is warm and dry.   Neurological:      General: No focal deficit present.      Mental Status: Alert and oriented to person, place and time.         Diagnostic Data:  Procedures      ASSESSMENT:   Diagnosis Plan   1. Nonrheumatic mitral valve regurgitation  Adult Transthoracic Echo Complete W/ Cont if Necessary Per Protocol      2. Chronic atrial fibrillation        3. Primary hypertension        4. Mixed hyperlipidemia              PLAN:  Chronic A. fib post leadless pacemaker ,anticoagulated    CAD post remote catheterization no obstructive symptoms continued observation    Mixed dyslipidemia controlled on statin therapy    Hypertension controlled eplerenone Entresto combination    CHF chronic with moderate plus mitral regurgitation redocument echocardiogram at this time      Edward Stephens MD, FACC

## 2024-05-15 NOTE — PROGRESS NOTES
May 15, 2024    Hello, may I speak with Smith Craven?    My name is ALEISHA BERTRAND      I am  with [facility] CARDIOLOGY  [address]  [place] [address] [phone].    Before we get started may I verify your date of birth? 1950    I am calling to officially welcome you to our practice and ask about your recent visit. Is this a good time to talk? yes    Tell me about your visit with us. What things went well?  EVERYTHING WAS GREAT        We're always looking for ways to make our patients' experiences even better. Do you have recommendations on ways we may improve?  no    Overall were you satisfied with your first visit to our practice? yes       I appreciate you taking the time to speak with me today. Is there anything else I can do for you? no      Thank you, and have a great day.

## 2024-05-28 ENCOUNTER — TELEPHONE (OUTPATIENT)
Dept: CARDIOLOGY | Facility: CLINIC | Age: 74
End: 2024-05-28
Payer: MEDICARE

## 2024-05-29 ENCOUNTER — OFFICE VISIT (OUTPATIENT)
Dept: CARDIOLOGY | Facility: CLINIC | Age: 74
End: 2024-05-29
Payer: MEDICARE

## 2024-05-29 VITALS
DIASTOLIC BLOOD PRESSURE: 76 MMHG | HEIGHT: 71 IN | HEART RATE: 76 BPM | BODY MASS INDEX: 33.96 KG/M2 | OXYGEN SATURATION: 97 % | WEIGHT: 242.6 LBS | SYSTOLIC BLOOD PRESSURE: 124 MMHG

## 2024-05-29 DIAGNOSIS — I10 PRIMARY HYPERTENSION: ICD-10-CM

## 2024-05-29 DIAGNOSIS — I38 VHD (VALVULAR HEART DISEASE): ICD-10-CM

## 2024-05-29 DIAGNOSIS — R55 NEUROCARDIOGENIC SYNCOPE: ICD-10-CM

## 2024-05-29 DIAGNOSIS — I48.21 PERMANENT ATRIAL FIBRILLATION: Primary | ICD-10-CM

## 2024-05-29 RX ORDER — CEFDINIR 300 MG/1
300 CAPSULE ORAL
COMMUNITY
Start: 2024-05-24 | End: 2024-05-31

## 2024-05-29 NOTE — PROGRESS NOTES
Smith Sunwer  1950  [phone]    05/29/2024    [facility] CARDIOLOGY     Referring Provider: No ref. provider found     Jeane Baird MD  [address] [place] [address]    Chief Complaint   Patient presents with    Atrial fibrillation with slow ventricular response       Problem List:     Permanent AF with slow ventricular response  CHADsVasc=3- on Eliquis  Noted to have AF with slow ventricular response during f/u with Dr. Napier, 5/27/21.   MCOT, 5/27/21, 15 day, 5/27/21-6/17/21, Max  bpm, Min HR 26 bpm, Avg HR 70 bpm. AF burden 98%,  HR to 30 bpm not associated with sleep, Pauses >2 seconds occurred 21 times with longest pause 3.8 seconds. PAC burden <1%, PVC burden 1%.   (off  Metoprolol during the monitor)   Cardiac disease, multifactorial:              A. 2008 Non-obstructive CAD by cath              B. Atrial fibrillation. CV = 2, on Eliquis  C. TTE, 12/15/18: biatrial enlargement, normal LVEF, mild MR, trivial pericardial effusion, mild PA hypertension  D. RHC 1/11/19: Severe biventricular elevation in diastolic pressures and severe PAH (pre-splenectomy)  E. LAUREN 1/11/19: EF 55%, mild-mod MR, LA and RA are dilated, saline test results positive with valsalva for PFO               F. 10/22 echo: biatrial enlargement, EF 50% mod MR normal RVSP              g. Atrial fibrillation with slow VR, summer 2021              A. Leadless PPM (MDT), 8/9/2021 ([facility]).  H. Echocardiogram 5/8/2023: EF 46-50%, moderate MR, RVSP 35-45 mmHg  2.    Obesity.  3.    Neurocardiogenic syncope.  4.    Cholelithiasis status post cholecystectomy, summer 2016:                          A. Procedure complicated by pneumonitis and need for blood transfusions.   5.    COPD and ANN MARIE, CPAP (stopped use in 2019)                          A. Followed by Yossi Blake MD.  6.    Chronic lower extremity venous insufficiency.  7.    Chronic leukopenia and anemia - resolved.              A.  "Hypersplenism              B.  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    Gilbert's syndrome (doubt, see # 7B above).   9.    \"Fatty liver with no cirrhosis or amyloid\" diagnosed by liver biopsy, Summer 2018    10.  Hospitalization, 12/2018: shortness of breath                          A.  Bilateral pleural effusions/pericardial effusion noted.                          B.  YAA=693, negative troponins                          C.  \"MASSIVE\" splenomegaly with no  ascites                                    D.   Recent O2 for desaturation.             11.   Splenectomy, laparoscopic: hereditary elliptocytosis, 4/18/2019             12.    COVID-19 positive with outpatient BAM tx, 2/2021.  Allergies  No Known Allergies    Current Medications    Current Outpatient Medications:     apixaban (ELIQUIS) 5 MG tablet tablet, Take 1 tablet by mouth Every 12 (Twelve) Hours. First dose Tuesday evening 8/10/21, Disp: 180 tablet, Rfl: 3    eplerenone (INSPRA) 25 MG tablet, Take 1 tablet by mouth Daily., Disp: 90 tablet, Rfl: 1    furosemide (LASIX) 40 MG tablet, Take 1 tablet by mouth Daily., Disp: 90 tablet, Rfl: 3    sacubitril-valsartan (ENTRESTO) 24-26 MG tablet, Take 1 tablet by mouth 2 (Two) Times a Day., Disp: 60 tablet, Rfl: 3    vitamin B-12 (CYANOCOBALAMIN) 1000 MCG tablet, Take 0.5 tablets by mouth Daily., Disp: , Rfl:     cefdinir (OMNICEF) 300 MG capsule, Take 1 capsule by mouth. (Patient not taking: Reported on 5/29/2024), Disp: , Rfl:     History of Present Illness     Pt presents for follow up of AF/bradycardia/neurocardiogenic syncope/ANN MARIE. Since we last saw the pt, pt states that he denies any significant palpitations.  He does note episodes of lightheadedness and dizziness upon sitting to standing as well as on occasion while standing upright.  He had a significant episode last week.  The episode was associated with sweating this, nausea, and lightheadedness.  An echocardiogram was ordered " "through our office for assessment of LVEF which is presently pending.  Denies any significant SOB, CP.  No syncope. Denies any hospitalizations, ER visits, bleeding, or TIA/CVA symptoms.  Blood pressures have been stable.          Vitals:    05/29/24 1431   BP: 124/76   BP Location: Right arm   Patient Position: Sitting   Pulse: 76   SpO2: 97%   Weight: 110 kg (242 lb 9.6 oz)   Height: 180.3 cm (71\")     Body mass index is 33.84 kg/m².  PE:  General: NAD  Neck: no JVD, no carotid bruits, no TM  Heart irregular regular rate and rhythm normal S1 and S2.  Lungs: CTA, no wheezes, rhonchi, or rales  Abd: soft, non-tender, NL BS  Ext: No musculoskeletal deformities, no edema, cyanosis, or clubbing  Psych: normal mood and affect    Diagnostic Data:    Leadless PM Manual Interrogation: normal function. V paced 87%, >8 years on battery.  No significant ventricular arrhythmias.    Procedures           1. Permanent atrial fibrillation    2. Neurocardiogenic syncope    3. Primary hypertension    4. VHD (valvular heart disease)          Plan:    1) Permanent atrial fibrillation with bradycardic response symptomatic non-reversible: VVIR Leadless pacemaker-Normal function.   2) vasovagal/neurocardiogenic syncope.  Probable recurrent episode last week.  Will await echocardiogram results in the upcoming few weeks.  May benefit from reduction of his blood pressure medications.  3) HTN: C see #2  4) Anticoagulaiton: Eliquis 5mg BID.   5) VHD, Moderate MR, +PFO,Normal EF 52% by Echo 5/2023.     F/up in 6 months      "

## 2024-06-24 ENCOUNTER — HOSPITAL ENCOUNTER (OUTPATIENT)
Dept: CARDIOLOGY | Facility: HOSPITAL | Age: 74
Discharge: HOME | End: 2024-06-24
Admitting: INTERNAL MEDICINE
Payer: MEDICARE

## 2024-06-24 VITALS — WEIGHT: 242.51 LBS | BODY MASS INDEX: 33.95 KG/M2 | HEIGHT: 71 IN

## 2024-06-24 DIAGNOSIS — I34.0 NONRHEUMATIC MITRAL VALVE REGURGITATION: ICD-10-CM

## 2024-06-24 LAB
BH CV ECHO MEAS - AO MAX PG: 11.6 MMHG
BH CV ECHO MEAS - AO MEAN PG: 6 MMHG
BH CV ECHO MEAS - AO ROOT DIAM: 4.1 CM
BH CV ECHO MEAS - AO V2 MAX: 170 CM/SEC
BH CV ECHO MEAS - AO V2 VTI: 31.3 CM
BH CV ECHO MEAS - AVA(I,D): 2.5 CM2
BH CV ECHO MEAS - EDV(CUBED): 245.4 ML
BH CV ECHO MEAS - EDV(MOD-SP2): 132 ML
BH CV ECHO MEAS - EDV(MOD-SP4): 155 ML
BH CV ECHO MEAS - EF(MOD-BP): 50.1 %
BH CV ECHO MEAS - EF(MOD-SP2): 39.8 %
BH CV ECHO MEAS - EF(MOD-SP4): 66.5 %
BH CV ECHO MEAS - ESV(CUBED): 77.9 ML
BH CV ECHO MEAS - ESV(MOD-SP2): 79.5 ML
BH CV ECHO MEAS - ESV(MOD-SP4): 51.9 ML
BH CV ECHO MEAS - FS: 31.8 %
BH CV ECHO MEAS - IVS/LVPW: 1.12 CM
BH CV ECHO MEAS - IVSD: 1.1 CM
BH CV ECHO MEAS - LA DIMENSION: 5.7 CM
BH CV ECHO MEAS - LAT PEAK E' VEL: 10.4 CM/SEC
BH CV ECHO MEAS - LV DIASTOLIC VOL/BSA (35-75): 68.5 CM2
BH CV ECHO MEAS - LV MASS(C)D: 279.7 GRAMS
BH CV ECHO MEAS - LV MAX PG: 6.1 MMHG
BH CV ECHO MEAS - LV MEAN PG: 3 MMHG
BH CV ECHO MEAS - LV SYSTOLIC VOL/BSA (12-30): 22.9 CM2
BH CV ECHO MEAS - LV V1 MAX: 123 CM/SEC
BH CV ECHO MEAS - LV V1 VTI: 24 CM
BH CV ECHO MEAS - LVIDD: 6.3 CM
BH CV ECHO MEAS - LVIDS: 4.3 CM
BH CV ECHO MEAS - LVOT AREA: 3.3 CM2
BH CV ECHO MEAS - LVOT DIAM: 2.04 CM
BH CV ECHO MEAS - LVPWD: 0.98 CM
BH CV ECHO MEAS - MED PEAK E' VEL: 6 CM/SEC
BH CV ECHO MEAS - MV A MAX VEL: 31.2 CM/SEC
BH CV ECHO MEAS - MV DEC SLOPE: 571.1 CM/SEC2
BH CV ECHO MEAS - MV DEC TIME: 0.19 SEC
BH CV ECHO MEAS - MV E MAX VEL: 142 CM/SEC
BH CV ECHO MEAS - MV E/A: 4.6
BH CV ECHO MEAS - MV MAX PG: 9.1 MMHG
BH CV ECHO MEAS - MV MEAN PG: 3.5 MMHG
BH CV ECHO MEAS - MV P1/2T: 77.3 MSEC
BH CV ECHO MEAS - MV V2 VTI: 34.8 CM
BH CV ECHO MEAS - MVA(P1/2T): 2.8 CM2
BH CV ECHO MEAS - MVA(VTI): 2.26 CM2
BH CV ECHO MEAS - PA ACC TIME: 0.13 SEC
BH CV ECHO MEAS - RAP SYSTOLE: 3 MMHG
BH CV ECHO MEAS - RVSP: 25 MMHG
BH CV ECHO MEAS - SV(LVOT): 78.8 ML
BH CV ECHO MEAS - SV(MOD-SP2): 52.5 ML
BH CV ECHO MEAS - SV(MOD-SP4): 103.1 ML
BH CV ECHO MEAS - SVI(LVOT): 34.8 ML/M2
BH CV ECHO MEAS - SVI(MOD-SP2): 23.2 ML/M2
BH CV ECHO MEAS - SVI(MOD-SP4): 45.5 ML/M2
BH CV ECHO MEAS - TAPSE (>1.6): 1.41 CM
BH CV ECHO MEAS - TR MAX PG: 21.8 MMHG
BH CV ECHO MEAS - TR MAX VEL: 230.8 CM/SEC
BH CV ECHO MEASUREMENTS AVERAGE E/E' RATIO: 17.32
BH CV XLRA - RV BASE: 4.7 CM
BH CV XLRA - RV LENGTH: 7.4 CM
BH CV XLRA - RV MID: 2.9 CM
BH CV XLRA - TDI S': 12.5 CM/SEC
LEFT ATRIUM VOLUME INDEX: 72.6 ML/M2

## 2024-06-24 PROCEDURE — 93306 TTE W/DOPPLER COMPLETE: CPT | Performed by: INTERNAL MEDICINE

## 2024-06-24 PROCEDURE — 93306 TTE W/DOPPLER COMPLETE: CPT

## 2024-06-25 ENCOUNTER — TELEPHONE (OUTPATIENT)
Dept: CARDIOLOGY | Facility: CLINIC | Age: 74
End: 2024-06-25
Payer: MEDICARE

## 2024-06-25 NOTE — TELEPHONE ENCOUNTER
Edward Stephens MD Jackson, Kristina, ROSENDO  Echo unchanged EF 46-50% from 2023  Mitral regurgitation has decreased significantly  Pt called and notified regarding the above results . Verbalized understanding . No further questions/concerns .

## 2024-06-26 ENCOUNTER — TELEPHONE (OUTPATIENT)
Dept: CARDIOLOGY | Facility: CLINIC | Age: 74
End: 2024-06-26
Payer: MEDICARE

## 2024-08-19 DIAGNOSIS — I27.20 PULMONARY HYPERTENSION: ICD-10-CM

## 2024-08-19 DIAGNOSIS — I48.0 PAROXYSMAL ATRIAL FIBRILLATION: ICD-10-CM

## 2024-08-19 RX ORDER — EPLERENONE 25 MG/1
25 TABLET, FILM COATED ORAL DAILY
Qty: 90 TABLET | Refills: 1 | Status: SHIPPED | OUTPATIENT
Start: 2024-08-19

## 2024-09-25 ENCOUNTER — TELEPHONE (OUTPATIENT)
Dept: CARDIOLOGY | Facility: CLINIC | Age: 74
End: 2024-09-25
Payer: MEDICARE

## 2024-11-18 ENCOUNTER — TELEPHONE (OUTPATIENT)
Dept: CARDIOLOGY | Facility: CLINIC | Age: 74
End: 2024-11-18
Payer: MEDICARE

## 2024-11-18 NOTE — TELEPHONE ENCOUNTER
Caller: NESSA WYNN    Relationship:PATIENT    Callback number: [phone]   Is it ok to leave a message: [x] Yes [] No    Requested medication for samples: ELIQUIS 5MG & ENTRESTO 24-26- MG    How much medication does the patient currently have left: 2 DAYS SUPPLY    Who will be picking up the samples: PATIENT    Do you need information about patient financial assistance for this medication: [] Yes [x] No    Additional details provided: PATIENT WOULD LIKE TO  IN [place]. PLEASE CONTACT PATIENT WHEN READY FOR

## 2024-12-27 ENCOUNTER — TELEPHONE (OUTPATIENT)
Dept: CARDIOLOGY | Facility: CLINIC | Age: 74
End: 2024-12-27
Payer: MEDICARE

## 2024-12-27 NOTE — TELEPHONE ENCOUNTER
Called Mr Craven to remind him his manual pacemaker reading is due today.  Left message to go ahead and do so when he can.  Left name and number if he has questions.

## 2025-03-19 ENCOUNTER — OFFICE VISIT (OUTPATIENT)
Dept: CARDIOLOGY | Facility: CLINIC | Age: 75
End: 2025-03-19
Payer: MEDICARE

## 2025-03-19 VITALS
SYSTOLIC BLOOD PRESSURE: 118 MMHG | HEART RATE: 72 BPM | BODY MASS INDEX: 33.43 KG/M2 | DIASTOLIC BLOOD PRESSURE: 68 MMHG | HEIGHT: 71 IN | OXYGEN SATURATION: 96 % | WEIGHT: 238.8 LBS

## 2025-03-19 DIAGNOSIS — I48.21 PERMANENT ATRIAL FIBRILLATION: ICD-10-CM

## 2025-03-19 DIAGNOSIS — E78.2 MIXED HYPERLIPIDEMIA: ICD-10-CM

## 2025-03-19 DIAGNOSIS — I10 PRIMARY HYPERTENSION: ICD-10-CM

## 2025-03-19 DIAGNOSIS — R06.09 DOE (DYSPNEA ON EXERTION): Primary | ICD-10-CM

## 2025-03-19 DIAGNOSIS — I20.89 ANGINA AT REST: ICD-10-CM

## 2025-03-19 PROCEDURE — 99214 OFFICE O/P EST MOD 30 MIN: CPT | Performed by: INTERNAL MEDICINE

## 2025-03-19 NOTE — PROGRESS NOTES
"[facility] Cardiology  Office Progress Note  Smith Craven  1950  [address] [place] [address]       Visit Date: 03/19/25    PCP: Jeane Baird MD  [address]   [place] [address]    IDENTIFICATION: A 74 y.o. male retired over the road       PROBLEM LIST:   Permanent AF with slow ventricular response  CHADsVasc=3- on Eliquis  Noted to have AF with slow ventricular response during f/u with Dr. Napier, 5/27/21.   MCOT, 5/27/21, 15 day, 5/27/21-6/17/21, Max  bpm, Min HR 26 bpm, Avg HR 70 bpm. AF burden 98%,  HR to 30 bpm not associated with sleep, Pauses >2 seconds occurred 21 times with longest pause 3.8 seconds. PAC burden <1%, PVC burden 1%.   (off  Metoprolol during the monitor)   Atrial fibrillation with slow VR, summer 2021              A. Leadless PPM (MDT), 8/9/2021 (Jarvis).  Cardiac disease, multifactorial:              A. 2008 Non-obstructive CAD by cath              B. RHC 1/11/19: Severe biventricular elevation in diastolic pressures and severe PAH (pre-splenectomy)  C. LAUREN 1/11/19: EF 55%, mild-mod MR, LA and RA are dilated, saline test results positive with valsalva for PFO               D. 10/22 echo: biatrial enlargement, EF 50% mod MR normal   RVSP               E. 5/23 echo EF >46% at least mod MR rvsp 40               F. 6/24 echo EF >46% AV scler mild/mod MR rvsp 25    3.    Neurocardiogenic syncope.  4.    Cholelithiasis status post cholecystectomy, summer 2016:                          A. Procedure complicated by pneumonitis and need for blood transfusions.   5.    COPD and ANN MARIE, CPAP (stopped use in 2019)                          A. Followed by Yossi Blake MD.  6.    Chronic lower extremity venous insufficiency.  7.    Chronic leukopenia and anemia - resolved.              A. Hypersplenism              B.  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    Gilbert's syndrome (doubt, see # 7B above).   9.    \"Fatty " "liver with no cirrhosis or amyloid\" diagnosed by liver biopsy, Summer 2018    10.  Hospitalization, 12/2018: shortness of breath                          A.  Bilateral pleural effusions/pericardial effusion noted.                          B.  NLQ=366, negative troponins                          C.  \"MASSIVE\" splenomegaly with no  ascites                                                   11.   Splenectomy, laparoscopic: hereditary elliptocytosis, 4/18/2019             12.    COVID-19 positive with outpatient BAM tx, 2/2021.      CC:   Chief Complaint   Patient presents with    Nonrheumatic mitral valve regurgitation       Allergies  No Known Allergies    Current Medications    Current Outpatient Medications:     apixaban (ELIQUIS) 5 MG tablet tablet, Take 1 tablet by mouth Every 12 (Twelve) Hours. First dose Tuesday evening 8/10/21, Disp: 180 tablet, Rfl: 3    eplerenone (INSPRA) 25 MG tablet, Take 1 tablet by mouth Daily., Disp: 90 tablet, Rfl: 1    furosemide (LASIX) 40 MG tablet, Take 1 tablet by mouth Daily., Disp: 90 tablet, Rfl: 3    sacubitril-valsartan (ENTRESTO) 24-26 MG tablet, Take 1 tablet by mouth 2 (Two) Times a Day., Disp: 180 tablet, Rfl: 3    vitamin B-12 (CYANOCOBALAMIN) 1000 MCG tablet, Take 0.5 tablets by mouth Daily., Disp: , Rfl:       History of Present Illness   Smith Craven is a 74 y.o. year old male here for follow up.  Lost their 50-year-old son this year with chronic medical problems.  States he has been having increased shortness of breath and some chest fullness        OBJECTIVE:  Vitals:    03/19/25 1524   BP: 118/68   BP Location: Right arm   Patient Position: Sitting   Cuff Size: Adult   Pulse: 72   SpO2: 96%   Weight: 108 kg (238 lb 12.8 oz)   Height: 180.3 cm (70.98\")         Body mass index is 33.32 kg/m².    Constitutional:       Appearance: Healthy appearance. Not in distress.   Neck:      Vascular: No JVR. JVD normal.   Pulmonary:      Effort: Pulmonary effort is normal.      " Breath sounds: Normal breath sounds. No wheezing. No rhonchi. No rales.   Chest:      Chest wall: Not tender to palpatation.   Cardiovascular:      PMI at left midclavicular line. Normal rate. Regular rhythm. Normal S1. Normal S2.       Murmurs: There is a systolic murmur.      No gallop.  No click. No rub.   Pulses:     Intact distal pulses.   Edema:     Peripheral edema present.  Abdominal:      General: Bowel sounds are normal.      Palpations: Abdomen is soft.      Tenderness: There is no abdominal tenderness.   Musculoskeletal: Normal range of motion.         General: No tenderness. Skin:     General: Skin is warm and dry.   Neurological:      General: No focal deficit present.      Mental Status: Alert and oriented to person, place and time.         Diagnostic Data:  Procedures      ASSESSMENT:   Diagnosis Plan   1. HARTMANN (dyspnea on exertion)        2. Permanent atrial fibrillation        3. Primary hypertension        4. Mixed hyperlipidemia                PLAN:  Chronic A. fib post leadless pacemaker ,anticoagulated    CAD post remote catheterization will risk stratify with Gavi PET scan    Mixed dyslipidemia controlled on statin therapy    Hypertension controlled eplerenone Entresto combination    CHF chronic with moderate  mitral regurgitation     Edward Stephens MD, FACC

## 2025-04-02 ENCOUNTER — TELEPHONE (OUTPATIENT)
Dept: CARDIOLOGY | Facility: CLINIC | Age: 75
End: 2025-04-02
Payer: MEDICARE

## 2025-04-07 ENCOUNTER — HOSPITAL ENCOUNTER (OUTPATIENT)
Dept: CARDIOLOGY | Facility: HOSPITAL | Age: 75
Discharge: HOME | End: 2025-04-07
Admitting: INTERNAL MEDICINE
Payer: MEDICARE

## 2025-04-07 VITALS
DIASTOLIC BLOOD PRESSURE: 82 MMHG | HEIGHT: 71 IN | HEART RATE: 75 BPM | BODY MASS INDEX: 33.33 KG/M2 | WEIGHT: 238.1 LBS | SYSTOLIC BLOOD PRESSURE: 136 MMHG

## 2025-04-07 DIAGNOSIS — I20.89 ANGINA AT REST: ICD-10-CM

## 2025-04-07 LAB
BH CV REST NUCLEAR ISOTOPE DOSE: 27.7 MCI
BH CV STRESS BP STAGE 1: NORMAL
BH CV STRESS BP STAGE 3: NORMAL
BH CV STRESS COMMENTS STAGE 1: NORMAL
BH CV STRESS DOSE REGADENOSON STAGE 1: 0.4
BH CV STRESS DURATION MIN STAGE 1: 1
BH CV STRESS DURATION MIN STAGE 2: 1
BH CV STRESS DURATION MIN STAGE 3: 1
BH CV STRESS DURATION MIN STAGE 4: 1
BH CV STRESS DURATION SEC STAGE 1: 0
BH CV STRESS DURATION SEC STAGE 2: 0
BH CV STRESS DURATION SEC STAGE 3: 0
BH CV STRESS DURATION SEC STAGE 4: 0
BH CV STRESS HR STAGE 1: 72
BH CV STRESS HR STAGE 2: 81
BH CV STRESS HR STAGE 3: 72
BH CV STRESS HR STAGE 4: 73
BH CV STRESS NUCLEAR ISOTOPE DOSE: 27.7 MCI
BH CV STRESS O2 STAGE 1: 97
BH CV STRESS O2 STAGE 2: 98
BH CV STRESS O2 STAGE 3: 100
BH CV STRESS O2 STAGE 4: 99
BH CV STRESS PROTOCOL 1: NORMAL
BH CV STRESS RECOVERY BP: NORMAL MMHG
BH CV STRESS RECOVERY HR: 72 BPM
BH CV STRESS RECOVERY O2: 98 %
BH CV STRESS STAGE 1: 1
BH CV STRESS STAGE 2: 2
BH CV STRESS STAGE 3: 3
BH CV STRESS STAGE 4: 4
MAXIMAL PREDICTED HEART RATE: 144 BPM
SPECT HRT GATED+EF W RNC IV: 47 %
STRESS BASELINE BP: NORMAL MMHG
STRESS BASELINE HR: 75 BPM
STRESS O2 SAT REST: 96 %
STRESS POST ESTIMATED WORKLOAD: 1 METS
STRESS POST EXERCISE DUR MIN: 4 MIN
STRESS POST EXERCISE DUR SEC: 0 SEC
STRESS POST O2 SAT PEAK: 100 %
STRESS POST PEAK BP: NORMAL MMHG
STRESS TARGET HR: 122 BPM

## 2025-04-07 PROCEDURE — 25010000002 REGADENOSON 0.4 MG/5ML SOLUTION: Performed by: INTERNAL MEDICINE

## 2025-04-07 PROCEDURE — 93018 CV STRESS TEST I&R ONLY: CPT | Performed by: INTERNAL MEDICINE

## 2025-04-07 PROCEDURE — 78431 MYOCRD IMG PET RST&STRS CT: CPT

## 2025-04-07 PROCEDURE — A9555 RB82 RUBIDIUM: HCPCS | Performed by: INTERNAL MEDICINE

## 2025-04-07 PROCEDURE — 78431 MYOCRD IMG PET RST&STRS CT: CPT | Performed by: INTERNAL MEDICINE

## 2025-04-07 PROCEDURE — 93016 CV STRESS TEST SUPVJ ONLY: CPT | Performed by: INTERNAL MEDICINE

## 2025-04-07 PROCEDURE — 93017 CV STRESS TEST TRACING ONLY: CPT

## 2025-04-07 PROCEDURE — 34310000005 RUBIDIUM CHLORIDE: Performed by: INTERNAL MEDICINE

## 2025-04-07 RX ORDER — REGADENOSON 0.08 MG/ML
0.4 INJECTION, SOLUTION INTRAVENOUS ONCE
Status: COMPLETED | OUTPATIENT
Start: 2025-04-07 | End: 2025-04-07

## 2025-04-07 RX ADMIN — RUBIDIUM CHLORIDE RB-82 1 DOSE: 150 INJECTION, SOLUTION INTRAVENOUS at 10:03

## 2025-04-07 RX ADMIN — RUBIDIUM CHLORIDE RB-82 1 DOSE: 150 INJECTION, SOLUTION INTRAVENOUS at 10:14

## 2025-04-07 RX ADMIN — REGADENOSON 0.4 MG: 0.08 INJECTION, SOLUTION INTRAVENOUS at 10:12

## 2025-04-10 ENCOUNTER — TELEPHONE (OUTPATIENT)
Dept: CARDIOLOGY | Facility: CLINIC | Age: 75
End: 2025-04-10
Payer: MEDICARE

## 2025-04-10 NOTE — TELEPHONE ENCOUNTER
Patient wife called and request results of patient stress test. Informed her Dr. Stephens is out this week so results have no been read by him yet. Informed her we will reach out as soon as results are available. She verbalizes understanding and is agreeable .    Preferred contact: [phone] until 3 pm tomorrow, [phone] is home phone with .

## 2025-04-14 ENCOUNTER — RESULTS FOLLOW-UP (OUTPATIENT)
Dept: CARDIOLOGY | Facility: CLINIC | Age: 75
End: 2025-04-14
Payer: MEDICARE

## 2025-04-14 NOTE — TELEPHONE ENCOUNTER
----- Message from Edward Stephens sent at 4/14/2025 10:45 AM EDT -----  Dr bermudez read the stress in my absence as low risk  Wanted to be sure received the news  ----- Message -----  From: Brant Bermudez MD  Sent: 4/7/2025   4:43 PM EDT  To: Edward Stephens MD    Pt called and informed of the above results, spoke with spouse regarding results .

## 2025-05-21 DIAGNOSIS — I27.20 PULMONARY HYPERTENSION: ICD-10-CM

## 2025-05-21 DIAGNOSIS — I48.0 PAROXYSMAL ATRIAL FIBRILLATION: ICD-10-CM

## 2025-05-23 RX ORDER — EPLERENONE 25 MG/1
TABLET ORAL
Qty: 150 TABLET | Refills: 0 | Status: SHIPPED | OUTPATIENT
Start: 2025-05-23

## 2025-05-30 ENCOUNTER — TELEPHONE (OUTPATIENT)
Dept: CARDIOLOGY | Facility: CLINIC | Age: 75
End: 2025-05-30
Payer: MEDICARE

## 2025-05-30 NOTE — TELEPHONE ENCOUNTER
LVM FOR PT LETTING HIM KNOW GFT WILL BE OUT OF THE OFFICE ON 6/4/25 THAT WE HAVE MOVED HIS APPT FROM 10:15 TO 3:15 W ALBA CAREY (DR. TAVARES'S PA)    OK FOR HUB TO RS APPT W BALDO BOYLE OR EDWIGE CAREY IF PT DOES NOT WANT TO SEE A PA THEN RESCHEDULE THEM TO NEXT AVAILABLE W DR. TAVARES (2026)

## 2025-06-04 ENCOUNTER — OFFICE VISIT (OUTPATIENT)
Dept: CARDIOLOGY | Facility: CLINIC | Age: 75
End: 2025-06-04
Payer: MEDICARE

## 2025-06-04 VITALS
OXYGEN SATURATION: 94 % | HEIGHT: 71 IN | HEART RATE: 74 BPM | WEIGHT: 244 LBS | SYSTOLIC BLOOD PRESSURE: 138 MMHG | DIASTOLIC BLOOD PRESSURE: 78 MMHG | BODY MASS INDEX: 34.16 KG/M2

## 2025-06-04 DIAGNOSIS — I48.21 PERMANENT ATRIAL FIBRILLATION: Primary | ICD-10-CM

## 2025-06-04 DIAGNOSIS — I25.10 CORONARY ARTERY DISEASE INVOLVING NATIVE CORONARY ARTERY OF NATIVE HEART WITHOUT ANGINA PECTORIS: ICD-10-CM

## 2025-06-04 NOTE — PROGRESS NOTES
Smith Craven  1950  [phone]      [facility] CARDIOLOGY         Jeane Baird MD  [address] [place] [address]    Chief Complaint   Patient presents with    Atrial fibrillation with slow ventricular response       Problem List:     Permanent AF with slow ventricular response  CHADsVasc=3- on Eliquis  Noted to have AF with slow ventricular response during f/u with Dr. Napier, 5/27/21.   MCOT, 5/27/21, 15 day, 5/27/21-6/17/21, Max  bpm, Min HR 26 bpm, Avg HR 70 bpm. AF burden 98%,  HR to 30 bpm not associated with sleep, Pauses >2 seconds occurred 21 times with longest pause 3.8 seconds. PAC burden <1%, PVC burden 1%.   (off  Metoprolol during the monitor)   Cardiac disease, multifactorial:              A. 2008 Non-obstructive CAD by cath              B. Atrial fibrillation. CV = 2, on Eliquis  C. TTE, 12/15/18: biatrial enlargement, normal LVEF, mild MR, trivial pericardial effusion, mild PA hypertension  D. RHC 1/11/19: Severe biventricular elevation in diastolic pressures and severe PAH (pre-splenectomy)  E. LAUREN 1/11/19: EF 55%, mild-mod MR, LA and RA are dilated, saline test results positive with valsalva for PFO               F. 10/22 echo: biatrial enlargement, EF 50% mod MR normal RVSP              g. Atrial fibrillation with slow VR, summer 2021              A. Leadless PPM (MDT), 8/9/2021 (Jarvis).  H. Echocardiogram 5/8/2023: EF 46-50%, moderate MR, RVSP 35-45 mmHg  I. EF 47%, No ischemia 4/7/2025.   2.    Obesity.  3.    Neurocardiogenic syncope.  4.    Cholelithiasis status post cholecystectomy, summer 2016:                          A. Procedure complicated by pneumonitis and need for blood transfusions.   5.    COPD and ANN MARIE, CPAP (stopped use in 2019)                          A. Followed by Yossi Blake MD.  6.    Chronic lower extremity venous insufficiency.  7.    Chronic leukopenia and anemia - resolved.              A. Hypersplenism              B.  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    Gilbert's syndrome (doubt, see # 7B above).   9.    \"Fatty liver with no cirrhosis or amyloid\" diagnosed by liver biopsy, Summer 2018    10.  Hospitalization, 12/2018: shortness of breath                          A.  Bilateral pleural effusions/pericardial effusion noted.                          B.  DWI=767, negative troponins                          C.  \"MASSIVE\" splenomegaly with no  ascites                                    D.   Recent O2 for desaturation.             11.   Splenectomy, laparoscopic: hereditary elliptocytosis, 4/18/2019             12.    COVID-19 positive with outpatient BAM tx, 2/2021.  Allergies  No Known Allergies    Current Medications    Current Outpatient Medications:     apixaban (ELIQUIS) 5 MG tablet tablet, Take 1 tablet by mouth Every 12 (Twelve) Hours. First dose Tuesday evening 8/10/21, Disp: 180 tablet, Rfl: 3    eplerenone (INSPRA) 25 MG tablet, Take 1 tablet BY MOUTH EVERY DAY for heart and/or BLOOD PRESSURE, Disp: 150 tablet, Rfl: 0    furosemide (LASIX) 40 MG tablet, Take 1 tablet by mouth Daily., Disp: 90 tablet, Rfl: 3    sacubitril-valsartan (ENTRESTO) 24-26 MG tablet, Take 1 tablet by mouth 2 (Two) Times a Day., Disp: 180 tablet, Rfl: 3    vitamin B-12 (CYANOCOBALAMIN) 1000 MCG tablet, Take 0.5 tablets by mouth Daily., Disp: , Rfl:     History of Present Illness     Pt presents for follow up of AF/bradycardia/neurocardiogenic syncope/ANN MARIE. Since we last saw the pt he has has some sob worse then usual. He had a negative PET scan for Ischemia., He states he is some better. He denies any  worsening edema or orthopnea.         Vitals:    06/04/25 1525   BP: 138/78   BP Location: Right arm   Patient Position: Sitting   Cuff Size: Adult   Pulse: 74   SpO2: 94%   Weight: 111 kg (244 lb)   Height: 180.3 cm (71\")       Body mass index is 34.03 kg/m².  PE:  General: NAD  Neck: no JVD, no carotid bruits, no " TM  Heart irregular regular rate and rhythm normal S1 and S2.  Lungs: CTA, no wheezes, rhonchi, or rales  Abd: soft, non-tender, NL BS  Ext: No musculoskeletal deformities, no edema, cyanosis, or clubbing  Psych: normal mood and affect    Diagnostic Data:    Leadless PM Manual Interrogation: VVVIR normal function. V paced 84%, >7 years on battery.  No significant ventricular arrhythmias.    Procedures           1. Permanent atrial fibrillation    2. Coronary artery disease involving native coronary artery of native heart without angina pectoris            Plan:    1) Permanent atrial fibrillation with bradycardic response symptomatic non-reversible: VVIR Leadless pacemaker-Normal function.     2) vasovagal/neurocardiogenic syncope.  No recurrent events    3) HTN: C see #2    4) Anticoagulaiton: Eliquis 5mg BID.     5) VHD, Moderate MR, +PFO,Normal EF 52% by Echo 5/2023. EF 47% PET scan on Ischemia. Followed by Dr. Stephens.       Electronically signed by TRACE Diego, 06/04/25, 4:03 PM EDT.

## 2025-07-02 ENCOUNTER — TELEPHONE (OUTPATIENT)
Dept: CARDIOLOGY | Facility: CLINIC | Age: 75
End: 2025-07-02
Payer: MEDICARE

## 2025-07-02 LAB
MDC_IDC_MSMT_BATTERY_REMAINING_LONGEVITY: 92 MO
MDC_IDC_MSMT_BATTERY_RRT_TRIGGER: 2.56
MDC_IDC_MSMT_BATTERY_STATUS: NORMAL
MDC_IDC_MSMT_BATTERY_VOLTAGE: 2.99
MDC_IDC_MSMT_LEADCHNL_RV_DTM: NORMAL
MDC_IDC_MSMT_LEADCHNL_RV_IMPEDANCE_VALUE: 410
MDC_IDC_MSMT_LEADCHNL_RV_PACING_THRESHOLD_AMPLITUDE: 0.75
MDC_IDC_MSMT_LEADCHNL_RV_PACING_THRESHOLD_POLARITY: NORMAL
MDC_IDC_MSMT_LEADCHNL_RV_PACING_THRESHOLD_PULSEWIDTH: 0.2
MDC_IDC_MSMT_LEADCHNL_RV_SENSING_INTR_AMPL: 10.35
MDC_IDC_PG_IMPLANT_DTM: NORMAL
MDC_IDC_PG_MFG: NORMAL
MDC_IDC_PG_MODEL: NORMAL
MDC_IDC_PG_SERIAL: NORMAL
MDC_IDC_PG_TYPE: NORMAL
MDC_IDC_SESS_DTM: NORMAL
MDC_IDC_SESS_TYPE: NORMAL
MDC_IDC_SET_BRADY_LOWRATE: 70
MDC_IDC_SET_BRADY_MAX_SENSOR_RATE: 120
MDC_IDC_SET_BRADY_MODE: NORMAL
MDC_IDC_SET_LEADCHNL_RV_PACING_AMPLITUDE: 1.25
MDC_IDC_SET_LEADCHNL_RV_PACING_POLARITY: NORMAL
MDC_IDC_SET_LEADCHNL_RV_PACING_PULSEWIDTH: 0.2
MDC_IDC_SET_LEADCHNL_RV_SENSING_POLARITY: NORMAL
MDC_IDC_SET_LEADCHNL_RV_SENSING_SENSITIVITY: 4

## 2025-07-02 NOTE — TELEPHONE ENCOUNTER
Called & left voice mail reminder that pt's scheduled [employer] remote cardiac device transmission is due today. Device clinic phone number provided.

## 2025-07-14 ENCOUNTER — OFFICE VISIT (OUTPATIENT)
Age: 75
End: 2025-07-14
Payer: MEDICARE

## 2025-07-14 VITALS
TEMPERATURE: 98.8 F | SYSTOLIC BLOOD PRESSURE: 120 MMHG | DIASTOLIC BLOOD PRESSURE: 72 MMHG | WEIGHT: 249 LBS | HEIGHT: 71 IN | BODY MASS INDEX: 34.86 KG/M2 | OXYGEN SATURATION: 97 % | HEART RATE: 80 BPM

## 2025-07-14 DIAGNOSIS — G47.33 OSA (OBSTRUCTIVE SLEEP APNEA): ICD-10-CM

## 2025-07-14 DIAGNOSIS — R06.09 DOE (DYSPNEA ON EXERTION): ICD-10-CM

## 2025-07-14 DIAGNOSIS — J45.40 MODERATE PERSISTENT ASTHMA WITHOUT COMPLICATION: ICD-10-CM

## 2025-07-14 DIAGNOSIS — J45.20 MILD INTERMITTENT REACTIVE AIRWAY DISEASE WITHOUT COMPLICATION: ICD-10-CM

## 2025-07-14 DIAGNOSIS — F41.9 ANXIETY: ICD-10-CM

## 2025-07-14 DIAGNOSIS — R06.02 SHORTNESS OF BREATH: ICD-10-CM

## 2025-07-14 DIAGNOSIS — R06.09 DYSPNEA ON EXERTION: Primary | ICD-10-CM

## 2025-07-14 RX ORDER — PAROXETINE 10 MG/1
10 TABLET, FILM COATED ORAL EVERY MORNING
Qty: 90 TABLET | Refills: 1 | Status: SHIPPED | OUTPATIENT
Start: 2025-07-14

## 2025-07-14 RX ORDER — FLUTICASONE FUROATE, UMECLIDINIUM BROMIDE AND VILANTEROL TRIFENATATE 100; 62.5; 25 UG/1; UG/1; UG/1
1 POWDER RESPIRATORY (INHALATION)
Qty: 1 EACH | Refills: 0 | COMMUNITY
Start: 2025-07-14

## 2025-07-14 RX ORDER — ALBUTEROL SULFATE 5 MG/ML
2.5 SOLUTION RESPIRATORY (INHALATION) EVERY 6 HOURS PRN
COMMUNITY

## 2025-07-15 PROBLEM — F41.9 ANXIETY: Status: ACTIVE | Noted: 2022-10-31

## 2025-07-15 NOTE — PROGRESS NOTES
[facility], [place] Pulmonary Follow up    CHIEF COMPLAINT    Dyspnea with exertion    HISTORY OF PRESENT ILLNESS    Smith Craven is a 74 y.o.male here today for follow-up.  He was originally seen in our office in 2016 per Dr. Blake for shortness of breath.  It was felt that his breathing was related to emphysema.  He was started on inhalers and had been doing fairly well.  His last appointment was in 2021 with Dr. Blake.    He had a splenectomy in 2019 at [facility] and had noticed a significant improvement in his symptoms after this surgery.    He had been doing fairly well up until earlier this year he developed pneumonia back in February and was treated with 2 rounds of antibiotics and steroids.  Since this time he has had more shortness of breath with exertion.    He has not been on his Breo inhaler in over a year.    He had done a nebulizer 1 time at night recently.    He denies any sputum production or hemoptysis.  Denies any fever, chills or night sweats.    He denies any chest pain or palpitations.  He does have lower extremity edema and has been taking Lasix daily.  He does follow with cardiology.    He lost his son earlier this year and is currently raising his great-grandchildren.  He states he is under a lot of stress.  He also feels like he may be depressed.    He denies any reflux symptoms.  Denies any swallowing difficulties.    He is sleeping okay.  He does snore.  He does wake up frequently at nighttime.  He does not feel well rested.    He is accompanied today by his wife.    Patient Active Problem List   Diagnosis    Non-obstructive coronary artery disease    Permanent atrial fibrillation    Neurocardiogenic syncope    HARTMANN (dyspnea on exertion)    Calculus of gallbladder with biliary obstruction but without cholecystitis    Bronchitis    Obesity (BMI 30-39.9)    Iron deficiency anemia    Reactive airway disease    ANN MARIE (obstructive sleep apnea)    Peripheral venous insufficiency    COPD  (chronic obstructive pulmonary disease)    Mitral valve regurgitation    Gilbert's syndrome    Splenomegaly    Pleural effusion, bilateral    Fatty liver- No cirrhosis by BX    Mitral valve regurgitation, MILD MR    Chronic anemia    Pericardial effusion    Mitral valve insufficiency    Hereditary elliptocytosis    Malnutrition of moderate degree    PFO (patent foramen ovale)    Splenomegaly    Bradycardia    Anxiety       No Known Allergies    Current Outpatient Medications:     albuterol (PROVENTIL) (5 MG/ML) 0.5% nebulizer solution, Take 0.5 mL by nebulization Every 6 (Six) Hours As Needed for Wheezing., Disp: , Rfl:     apixaban (ELIQUIS) 5 MG tablet tablet, Take 1 tablet by mouth Every 12 (Twelve) Hours. First dose Tuesday evening 8/10/21, Disp: 180 tablet, Rfl: 3    eplerenone (INSPRA) 25 MG tablet, Take 1 tablet BY MOUTH EVERY DAY for heart and/or BLOOD PRESSURE, Disp: 150 tablet, Rfl: 0    furosemide (LASIX) 40 MG tablet, Take 1 tablet by mouth Daily., Disp: 90 tablet, Rfl: 3    sacubitril-valsartan (ENTRESTO) 24-26 MG tablet, Take 1 tablet by mouth 2 (Two) Times a Day., Disp: 180 tablet, Rfl: 3    vitamin B-12 (CYANOCOBALAMIN) 1000 MCG tablet, Take 0.5 tablets by mouth Daily., Disp: , Rfl:     Fluticasone-Umeclidin-Vilant (Trelegy Ellipta) 100-62.5-25 MCG/ACT inhaler, Inhale 1 puff Daily., Disp: 1 each, Rfl: 0    PARoxetine (Paxil) 10 MG tablet, Take 1 tablet by mouth Every Morning., Disp: 90 tablet, Rfl: 1  MEDICATION LIST AND ALLERGIES REVIEWED.    Social History     Tobacco Use    Smoking status: Never     Passive exposure: Never    Smokeless tobacco: Never   Vaping Use    Vaping status: Never Used   Substance Use Topics    Alcohol use: No    Drug use: No       FAMILY AND SOCIAL HISTORY REVIEWED.    Review of Systems   Constitutional:  Positive for fatigue. Negative for activity change, appetite change, fever and unexpected weight change.   HENT:  Negative for congestion, postnasal drip, rhinorrhea,  "sinus pressure, sore throat and voice change.    Eyes:  Negative for visual disturbance.   Respiratory:  Positive for shortness of breath. Negative for cough, chest tightness and wheezing.    Cardiovascular:  Negative for chest pain, palpitations and leg swelling.   Gastrointestinal:  Negative for abdominal distention, abdominal pain, nausea and vomiting.   Endocrine: Negative for cold intolerance and heat intolerance.   Genitourinary:  Negative for difficulty urinating and urgency.   Musculoskeletal:  Negative for arthralgias, back pain and neck pain.   Skin:  Negative for color change and pallor.   Allergic/Immunologic: Negative for environmental allergies and food allergies.   Neurological:  Negative for dizziness, syncope, weakness and light-headedness.   Hematological:  Negative for adenopathy. Does not bruise/bleed easily.   Psychiatric/Behavioral:  Negative for agitation and behavioral problems.    .    /72   Pulse 80   Temp 98.8 °F (37.1 °C)   Ht 180.3 cm (71\")   Wt 113 kg (249 lb)   SpO2 97% Comment: Room air at rest  BMI 34.73 kg/m²     Immunization History   Administered Date(s) Administered    DTaP 01/15/2019    Fluzone  >6mos 10/31/2022    Hib (PRP-T) 01/15/2019    Meningococcal Conjugate 01/15/2019    Pneumococcal Conjugate 13-Valent (PCV13) 01/15/2019    Pneumococcal Conjugate 20-Valent (PCV20) 11/12/2024    Pneumococcal Polysaccharide (PPSV23) 02/06/2020    Td (TDVAX) 01/15/2019    Tdap 01/15/2019       Physical Exam  Vitals and nursing note reviewed.   Constitutional:       Appearance: He is well-developed. He is not diaphoretic.   HENT:      Head: Normocephalic and atraumatic.   Eyes:      Pupils: Pupils are equal, round, and reactive to light.   Neck:      Thyroid: No thyromegaly.   Cardiovascular:      Rate and Rhythm: Normal rate and regular rhythm.      Heart sounds: Normal heart sounds. No murmur heard.     No friction rub. No gallop.   Pulmonary:      Effort: Pulmonary effort is " normal. No respiratory distress.      Breath sounds: Normal breath sounds. No wheezing or rales.   Chest:      Chest wall: No tenderness.   Abdominal:      General: Bowel sounds are normal.      Palpations: Abdomen is soft.      Tenderness: There is no abdominal tenderness.   Musculoskeletal:         General: No swelling. Normal range of motion.      Cervical back: Normal range of motion and neck supple.   Lymphadenopathy:      Cervical: No cervical adenopathy.   Skin:     General: Skin is warm and dry.      Capillary Refill: Capillary refill takes less than 2 seconds.   Neurological:      Mental Status: He is alert and oriented to person, place, and time.   Psychiatric:         Mood and Affect: Mood normal.         Behavior: Behavior normal.         RESULTS    Spirometry Interpretation: FVC 3.27 92% predicted, FEV1 2.33 88% predicted, FEV1/FVC 71% predicted, TLC 5.56 88% predicted, DLCO 54% predicted, no obstruction, no restriction and reduced DLCO.      6-minute walk test: Patient ambulated 950 feet never desaturated below 89%, he does not meet qualifications for oxygen with activity, his percent of predicted was 84%.    XR Chest PA & Lateral  Result Date: 7/14/2025  Impression: No acute cardiopulmonary process. Electronically Signed: Zahira Kimble MD  7/14/2025 1:15 PM EDT  Workstation ID: [id number]    PROBLEM LIST    Problem List Items Addressed This Visit          Cardiac and Vasculature    HARTMANN (dyspnea on exertion)       Mental Health    Anxiety    Relevant Medications    PARoxetine (Paxil) 10 MG tablet       Pulmonary and Pneumonias    Reactive airway disease       Sleep    ANN MARIE (obstructive sleep apnea)    Overview   Off CPAP now          Other Visit Diagnoses         Dyspnea on exertion    -  Primary    Relevant Orders    6 Minute Walk Test (Completed)      Moderate persistent asthma without complication        Relevant Medications    albuterol (PROVENTIL) (5 MG/ML) 0.5% nebulizer solution     Fluticasone-Umeclidin-Vilant (Trelegy Ellipta) 100-62.5-25 MCG/ACT inhaler    Other Relevant Orders    XR Chest PA & Lateral (Completed)    Spirometry with Diffusion Capacity & Lung Volumes (Completed)      Shortness of breath        Relevant Orders    CT Chest Hi Resolution              DISCUSSION    Mr. Craven was here for follow-up.  He had been seen in several years at our office.  We reviewed his PFTs and he has no obstruction or restriction.  I did advise him that we could resume his Breo or try Trelegy 100 and I did give him a sample of this in the office today.  Did advise him to use this for 2 weeks and if he needs a prescription I would send this in for him.  I will also renew his nebulizer solution that he can use as needed for shortness of breath or wheezing.    We reviewed his chest x-ray that shows no acute pulmonary process.  I am going to go ahead and proceed with an HRCT to further evaluate his shortness of breath.  I will call him with report.    We reviewed his 6-minute walk test and he does not meet qualifications for oxygen with activity.  I do suspect that deconditioning could be contributing to his shortness of breath.    He does feel like he has more anxiety and has mild depression.  I would like to start him on Paxil 10 mg daily.  I did advise manage try this for at least 4 to 6 weeks to see if this shows any improvement in his mood.    We will discuss a sleep study in the future.  For now we will try these things and see if he is feeling better at his follow-up.    We will schedule him a follow-up in 4 to 6 weeks.    I personally spent a total of 45 minutes on patient visit today including chart review, face to face with the patient obtaining the history and physical exam, review of pertinent images and tests, counseling and discussion and/or coordination of care as described above, and documentation.  Total time excludes time spent on other separate services such as performing procedures  or test interpretation, if applicable.        Myrna Thorne, APRN  07/14/202511:16 EDT  Electronically signed     Please note that portions of this note were completed with a voice recognition program.        CC: Jeane Baird MD

## 2025-08-02 ENCOUNTER — HOSPITAL ENCOUNTER (OUTPATIENT)
Facility: HOSPITAL | Age: 75
Discharge: HOME | End: 2025-08-02
Payer: MEDICARE

## 2025-08-02 DIAGNOSIS — R06.02 SHORTNESS OF BREATH: ICD-10-CM

## 2025-08-02 PROCEDURE — 71250 CT THORAX DX C-: CPT

## 2025-08-08 DIAGNOSIS — J18.9 PNEUMONIA DUE TO INFECTIOUS ORGANISM, UNSPECIFIED LATERALITY, UNSPECIFIED PART OF LUNG: Primary | ICD-10-CM

## 2025-08-08 RX ORDER — PREDNISONE 10 MG/1
TABLET ORAL
Qty: 31 TABLET | Refills: 0 | Status: SHIPPED | OUTPATIENT
Start: 2025-08-08

## 2025-08-08 RX ORDER — LEVOFLOXACIN 500 MG/1
500 TABLET, FILM COATED ORAL DAILY
Qty: 7 TABLET | Refills: 0 | Status: SHIPPED | OUTPATIENT
Start: 2025-08-08

## 2025-08-11 ENCOUNTER — TELEPHONE (OUTPATIENT)
Age: 75
End: 2025-08-11
Payer: MEDICARE

## 2025-08-20 DIAGNOSIS — J44.9 CHRONIC OBSTRUCTIVE PULMONARY DISEASE, UNSPECIFIED COPD TYPE: Primary | ICD-10-CM

## 2025-08-20 RX ORDER — PREDNISONE 10 MG/1
TABLET ORAL
Qty: 31 TABLET | Refills: 0 | Status: SHIPPED | OUTPATIENT
Start: 2025-08-20

## (undated) DEVICE — TUBING, SUCTION, 1/4" X 10', STRAIGHT: Brand: MEDLINE

## (undated) DEVICE — SWAN-GANZ THERMODILUTION CATHETER: Brand: SWAN-GANZ

## (undated) DEVICE — ADULT, W/LG. BACK PAD, RADIOTRANSPARENT ELEMENT AND LEAD WIRE: Brand: DEFIBRILLATION ELECTRODES

## (undated) DEVICE — INTRO SHEATH ENGAGE W/50 GW .038 8F12

## (undated) DEVICE — GW DIAG .032

## (undated) DEVICE — CANN NASL CO2 DIVIDED A/

## (undated) DEVICE — ST EXT IV SMARTSITE 2VLV SP M LL 5ML IV1

## (undated) DEVICE — DRSNG SURESITE123 4X4.8IN

## (undated) DEVICE — CO-SET DELIVERY SYSTEM FOR 123 ROOM TEMPATURE INJECTATE: Brand: CO-SET+

## (undated) DEVICE — IRRIGATOR BULB ASEPTO 60CC STRL

## (undated) DEVICE — KT MANIFOLD CATHLAB CUST

## (undated) DEVICE — PK CATH CARD 10

## (undated) DEVICE — SOL NACL 0.9PCT 1000ML

## (undated) DEVICE — LIMB HOLDER, WRIST/ANKLE: Brand: DEROYAL

## (undated) DEVICE — DECANT BG O JET

## (undated) DEVICE — ST INF PRI SMRTSTE 20DRP 2VLV 24ML 117

## (undated) DEVICE — MEDI-VAC YANKAUER SUCTION HANDLE W/BULBOUS TIP: Brand: CARDINAL HEALTH

## (undated) DEVICE — LEX ELECTRO PHYSIOLOGY: Brand: MEDLINE INDUSTRIES, INC.

## (undated) DEVICE — PENCL E/S HNDSWCH ROCKRBTN HOLSTR 10FT

## (undated) DEVICE — GW FC J TFE/COAT .025 3MM 180CM

## (undated) DEVICE — SKIN PREP TRAY W/CHG: Brand: MEDLINE INDUSTRIES, INC.

## (undated) DEVICE — SET PRIMARY GRVTY 10DP MALE LL 104IN

## (undated) DEVICE — 3M™ STERI-STRIP™ REINFORCED ADHESIVE SKIN CLOSURES, R1547, 1/2 IN X 4 IN (12 MM X 100 MM), 6 STRIPS/ENVELOPE: Brand: 3M™ STERI-STRIP™

## (undated) DEVICE — ANGIO-SEAL EVOLUTION VASCULAR CLOSURE DEVICE: Brand: ANGIO-SEAL

## (undated) DEVICE — SUT MNCRYL 4/0 PS2 18 IN

## (undated) DEVICE — CAUTERY TIP POLISHER: Brand: DEVON